# Patient Record
Sex: FEMALE | Race: WHITE | NOT HISPANIC OR LATINO | Employment: UNEMPLOYED | URBAN - METROPOLITAN AREA
[De-identification: names, ages, dates, MRNs, and addresses within clinical notes are randomized per-mention and may not be internally consistent; named-entity substitution may affect disease eponyms.]

---

## 2022-08-17 DIAGNOSIS — I35.0 AORTIC VALVE STENOSIS: Primary | ICD-10-CM

## 2022-08-23 ENCOUNTER — OFFICE VISIT (OUTPATIENT)
Dept: CARDIAC SURGERY | Facility: CLINIC | Age: 77
End: 2022-08-23
Payer: MEDICARE

## 2022-08-23 VITALS
SYSTOLIC BLOOD PRESSURE: 126 MMHG | BODY MASS INDEX: 25.34 KG/M2 | HEART RATE: 73 BPM | DIASTOLIC BLOOD PRESSURE: 69 MMHG | WEIGHT: 143 LBS | HEIGHT: 63 IN

## 2022-08-23 DIAGNOSIS — R06.02 SOB (SHORTNESS OF BREATH): Primary | ICD-10-CM

## 2022-08-23 DIAGNOSIS — I35.1 SEVERE AORTIC INSUFFICIENCY: Primary | ICD-10-CM

## 2022-08-23 DIAGNOSIS — R91.8 MASS OF UPPER LOBE OF RIGHT LUNG: ICD-10-CM

## 2022-08-23 DIAGNOSIS — I35.0 AORTIC VALVE STENOSIS: ICD-10-CM

## 2022-08-23 PROCEDURE — 99204 PR OFFICE/OUTPT VISIT, NEW, LEVL IV, 45-59 MIN: ICD-10-PCS | Mod: ,,, | Performed by: SPECIALIST

## 2022-08-23 PROCEDURE — 99204 OFFICE O/P NEW MOD 45 MIN: CPT | Mod: ,,, | Performed by: SPECIALIST

## 2022-08-23 RX ORDER — METOPROLOL SUCCINATE 50 MG/1
50 TABLET, EXTENDED RELEASE ORAL DAILY
Status: ON HOLD | COMMUNITY
Start: 2022-08-15 | End: 2022-10-21 | Stop reason: SDUPTHER

## 2022-08-23 RX ORDER — POTASSIUM CHLORIDE 20 MEQ/1
20 TABLET, EXTENDED RELEASE ORAL DAILY
Status: ON HOLD | COMMUNITY
Start: 2022-08-15 | End: 2022-10-21 | Stop reason: SDUPTHER

## 2022-08-23 RX ORDER — SIMVASTATIN 20 MG/1
20 TABLET, FILM COATED ORAL NIGHTLY
COMMUNITY
Start: 2022-08-15

## 2022-08-23 RX ORDER — SULFAMETHOXAZOLE AND TRIMETHOPRIM 800; 160 MG/1; MG/1
1 TABLET ORAL DAILY
COMMUNITY
End: 2022-09-13

## 2022-08-23 RX ORDER — RALOXIFENE HYDROCHLORIDE 60 MG/1
60 TABLET, FILM COATED ORAL DAILY
COMMUNITY
Start: 2022-08-15

## 2022-08-23 RX ORDER — LOSARTAN POTASSIUM 100 MG/1
100 TABLET ORAL DAILY
Status: ON HOLD | COMMUNITY
Start: 2022-08-15 | End: 2022-10-12 | Stop reason: HOSPADM

## 2022-08-23 RX ORDER — FUROSEMIDE 40 MG/1
40 TABLET ORAL DAILY
Status: ON HOLD | COMMUNITY
Start: 2022-08-15 | End: 2022-10-12 | Stop reason: HOSPADM

## 2022-08-23 RX ORDER — LEVOTHYROXINE SODIUM 75 UG/1
75 TABLET ORAL DAILY
Status: ON HOLD | COMMUNITY
Start: 2022-08-15 | End: 2022-10-21 | Stop reason: HOSPADM

## 2022-08-23 NOTE — PROGRESS NOTES
[facility]  History & Physical  Cardiothoracic Surgery    SUBJECTIVE:     Chief Complaint/Reason for Visit:   Chief Complaint   Patient presents with    Pre-op Exam     Dr. Mason RE: AVR         History of Present Illness:  Patient is a 76 y.o. female presents referred by Dr. Mason for evaluation for aortic valve replacement.  She is a 76-year-old female who has been followed for valvular disease for at least 5 years.  She has had intermittent mitral regurg and now appears to be minimal.  But now she has severe aortic insufficiency at 4+.  She has diminished LV function at about 45%.  She really is asymptomatic at this time.  She has a mild to moderately enlarged ascending aorta at 4.1 cm on the proximal portion.  She is also getting some LV distention.  I do think she will benefit from an aortic valve replacement with a porcine valve.  We need a little bit more information before planning surgery.  I would like to get a CT of the chest to evaluate the remaining aorta.  In addition we will obtain a left heart catheterization.  I will see her back after these test.    Review of patient's allergies indicates:   Allergen Reactions    Ace inhibitors     Aspirin     Atorvastatin     Penicillins        No past medical history on file.  No past surgical history on file.  No family history on file.  Social History     Tobacco Use    Smoking status: Never Smoker    Smokeless tobacco: Never Used        Review of Systems:  Review of Systems   Constitutional: Negative.   HENT: Negative.    Eyes: Negative.    Cardiovascular: Negative.    Endocrine: Negative.    Skin: Negative.    Musculoskeletal: Negative.    Gastrointestinal: Negative.    Genitourinary: Negative.    Neurological: Negative.    Psychiatric/Behavioral: Negative.    Allergic/Immunologic: Negative.        OBJECTIVE:     Vital Signs (Most Recent):  Pulse: 73 (08/23/22 1255)  BP: 126/69 (08/23/22 1255)    Admission: Weight: 64.9 kg  (143 lb) (08/23/22 1255)   Most Recent: Weight: 64.9 kg (143 lb) (08/23/22 1255)    Physical Exam:  Physical Exam  Vitals reviewed.   Constitutional:       Appearance: Normal appearance. She is normal weight.   HENT:      Head: Normocephalic and atraumatic.   Eyes:      Pupils: Pupils are equal, round, and reactive to light.   Cardiovascular:      Rate and Rhythm: Normal rate and regular rhythm.      Heart sounds: Murmur heard.      Comments: Soft murmur of aortic insufficiency  Pulmonary:      Effort: Pulmonary effort is normal.      Breath sounds: Normal breath sounds.   Abdominal:      General: Abdomen is flat.      Palpations: Abdomen is soft.   Musculoskeletal:         General: Normal range of motion.      Cervical back: Normal range of motion.   Skin:     General: Skin is warm.   Neurological:      General: No focal deficit present.      Mental Status: She is alert and oriented to person, place, and time.   Psychiatric:         Mood and Affect: Mood normal.         Behavior: Behavior normal.         Diagnostic Results:  Echo: Reviewed      ASSESSMENT/PLAN:     1. Aortic valve stenosis    Severe aortic insufficiency   Mild mitral regurgitation   Cardiomyopathy with EF of 45%   LV dilatation   Ascending aortic dilatation   Obtain CT of chest to evaluate aorta  Obtain left heart catheterization  Office visit after above for surgical planning

## 2022-08-30 ENCOUNTER — TELEPHONE (OUTPATIENT)
Dept: CARDIAC SURGERY | Facility: CLINIC | Age: 77
End: 2022-08-30
Payer: MEDICARE

## 2022-08-30 NOTE — TELEPHONE ENCOUNTER
Rec'd call from [facility] on yesterday afternoon stating Ms. Bacon called and LM without call back #, asking if we can call her back. Returned call @1500, spoke with her and gave her the scheduling phone number [phone], to schedule her CT. Voiced understanding and stated she would call.  This morning rec'd call from [facility] again, stating that Ms. Bacon called and LM. Returned call to Ms. Bacon @ 0815. She stated that she was trying to schedule her CT and that she called the number I gave her. Alternate #, [phone], given to her should she need.

## 2022-08-31 ENCOUNTER — HOSPITAL ENCOUNTER (OUTPATIENT)
Dept: RADIOLOGY | Facility: HOSPITAL | Age: 77
Discharge: HOME | End: 2022-08-31
Attending: SPECIALIST
Payer: MEDICARE

## 2022-08-31 DIAGNOSIS — R06.02 SOB (SHORTNESS OF BREATH): ICD-10-CM

## 2022-08-31 PROCEDURE — 71250 CT THORAX DX C-: CPT | Mod: TC

## 2022-09-13 ENCOUNTER — OFFICE VISIT (OUTPATIENT)
Dept: CARDIAC SURGERY | Facility: CLINIC | Age: 77
End: 2022-09-13
Payer: MEDICARE

## 2022-09-13 VITALS
SYSTOLIC BLOOD PRESSURE: 145 MMHG | HEIGHT: 63 IN | BODY MASS INDEX: 25.16 KG/M2 | DIASTOLIC BLOOD PRESSURE: 50 MMHG | WEIGHT: 142 LBS | HEART RATE: 60 BPM

## 2022-09-13 DIAGNOSIS — I42.8 NONISCHEMIC CARDIOMYOPATHY: ICD-10-CM

## 2022-09-13 DIAGNOSIS — I35.2 NONRHEUMATIC AORTIC INSUFFICIENCY WITH AORTIC STENOSIS: ICD-10-CM

## 2022-09-13 DIAGNOSIS — I71.21 ASCENDING AORTIC ANEURYSM: Primary | ICD-10-CM

## 2022-09-13 PROCEDURE — 99214 PR OFFICE/OUTPT VISIT, EST, LEVL IV, 30-39 MIN: ICD-10-PCS | Mod: ,,, | Performed by: SPECIALIST

## 2022-09-13 PROCEDURE — 99214 OFFICE O/P EST MOD 30 MIN: CPT | Mod: ,,, | Performed by: SPECIALIST

## 2022-09-13 NOTE — PROGRESS NOTES
[facility]  History & Physical  Cardiothoracic Surgery    SUBJECTIVE:     Chief Complaint/Reason for Visit:   Chief Complaint   Patient presents with    Pre-op Exam     Results CT and [facility], Discuss Sx AVR        History of Present Illness:  Patient is a 76 y.o. female presents now for follow-up after having had a left heart catheterization and her CT scan of the chest.  Left heart cath reveals no coronary disease.  CT scan of the chest reveals the ascending aorta is 4.6 cm.  On my measurement in some areas it is 4.8 cm.  I do think she will benefit from resection of the ascending aortic aneurysm and an aortic valve replacement with a porcine valve.  I discussed the risk benefits in great detail with the patient.  She understands and would like to proceed.    Review of patient's allergies indicates:   Allergen Reactions    Ace inhibitors     Aspirin     Atorvastatin     Penicillins        Past Medical History:   Diagnosis Date    Hypertension     Hypothyroidism     Stenosis of aortic and mitral valves     Valvular regurgitation      Past Surgical History:   Procedure Laterality Date    CATARACT EXTRACTION       No family history on file.  Social History     Tobacco Use    Smoking status: Never    Smokeless tobacco: Never        Review of Systems:  Review of Systems   Constitutional: Negative.   HENT: Negative.     Eyes: Negative.    Cardiovascular: Negative.    Respiratory: Negative.     Endocrine: Negative.    Hematologic/Lymphatic: Negative.    Skin: Negative.    Musculoskeletal: Negative.    Gastrointestinal: Negative.    Genitourinary: Negative.    Neurological: Negative.    Psychiatric/Behavioral: Negative.       OBJECTIVE:     Vital Signs (Most Recent):  Pulse: 60 (09/13/22 0904)  BP: (!) 145/50 (09/13/22 0904)    Admission: Weight: 64.4 kg (142 lb) (09/13/22 0904)   Most Recent: Weight: 64.4 kg (142 lb) (09/13/22 0904)    Physical Exam:  Physical Exam  Constitutional:        Appearance: Normal appearance. She is normal weight.   HENT:      Head: Normocephalic and atraumatic.   Eyes:      Pupils: Pupils are equal, round, and reactive to light.   Cardiovascular:      Rate and Rhythm: Normal rate and regular rhythm.      Pulses: Normal pulses.      Heart sounds: Normal heart sounds.   Pulmonary:      Effort: Pulmonary effort is normal.      Breath sounds: Normal breath sounds.   Abdominal:      General: Abdomen is flat.      Palpations: Abdomen is soft.   Musculoskeletal:         General: Normal range of motion.      Cervical back: Normal range of motion.   Skin:     General: Skin is warm.   Neurological:      General: No focal deficit present.      Mental Status: She is alert and oriented to person, place, and time.   Psychiatric:         Mood and Affect: Mood normal.         Behavior: Behavior normal.       Diagnostic Results:  CT: Reviewed  Cardiac Cath: Reviewed      ASSESSMENT/PLAN:     No diagnosis found.  Severe aortic insufficiency   Mild mitral regurgitation   Nonischemic cardiomyopathy with 45% EF  Ascending aortic aneurysm at 4.6 cm   Dilated left ventricle  Planning aortic valve replacement with a porcine valve, resection of ascending aortic aneurysm, ligation left atrial appendage

## 2022-09-13 NOTE — H&P (VIEW-ONLY)
[facility]  History & Physical  Cardiothoracic Surgery    SUBJECTIVE:     Chief Complaint/Reason for Visit:   Chief Complaint   Patient presents with    Pre-op Exam     Results CT and [facility], Discuss Sx AVR        History of Present Illness:  Patient is a 76 y.o. female presents now for follow-up after having had a left heart catheterization and her CT scan of the chest.  Left heart cath reveals no coronary disease.  CT scan of the chest reveals the ascending aorta is 4.6 cm.  On my measurement in some areas it is 4.8 cm.  I do think she will benefit from resection of the ascending aortic aneurysm and an aortic valve replacement with a porcine valve.  I discussed the risk benefits in great detail with the patient.  She understands and would like to proceed.    Review of patient's allergies indicates:   Allergen Reactions    Ace inhibitors     Aspirin     Atorvastatin     Penicillins        Past Medical History:   Diagnosis Date    Hypertension     Hypothyroidism     Stenosis of aortic and mitral valves     Valvular regurgitation      Past Surgical History:   Procedure Laterality Date    CATARACT EXTRACTION       No family history on file.  Social History     Tobacco Use    Smoking status: Never    Smokeless tobacco: Never        Review of Systems:  Review of Systems   Constitutional: Negative.   HENT: Negative.     Eyes: Negative.    Cardiovascular: Negative.    Respiratory: Negative.     Endocrine: Negative.    Hematologic/Lymphatic: Negative.    Skin: Negative.    Musculoskeletal: Negative.    Gastrointestinal: Negative.    Genitourinary: Negative.    Neurological: Negative.    Psychiatric/Behavioral: Negative.       OBJECTIVE:     Vital Signs (Most Recent):  Pulse: 60 (09/13/22 0904)  BP: (!) 145/50 (09/13/22 0904)    Admission: Weight: 64.4 kg (142 lb) (09/13/22 0904)   Most Recent: Weight: 64.4 kg (142 lb) (09/13/22 0904)    Physical Exam:  Physical Exam  Constitutional:        Appearance: Normal appearance. She is normal weight.   HENT:      Head: Normocephalic and atraumatic.   Eyes:      Pupils: Pupils are equal, round, and reactive to light.   Cardiovascular:      Rate and Rhythm: Normal rate and regular rhythm.      Pulses: Normal pulses.      Heart sounds: Normal heart sounds.   Pulmonary:      Effort: Pulmonary effort is normal.      Breath sounds: Normal breath sounds.   Abdominal:      General: Abdomen is flat.      Palpations: Abdomen is soft.   Musculoskeletal:         General: Normal range of motion.      Cervical back: Normal range of motion.   Skin:     General: Skin is warm.   Neurological:      General: No focal deficit present.      Mental Status: She is alert and oriented to person, place, and time.   Psychiatric:         Mood and Affect: Mood normal.         Behavior: Behavior normal.       Diagnostic Results:  CT: Reviewed  Cardiac Cath: Reviewed      ASSESSMENT/PLAN:     No diagnosis found.  Severe aortic insufficiency   Mild mitral regurgitation   Nonischemic cardiomyopathy with 45% EF  Ascending aortic aneurysm at 4.6 cm   Dilated left ventricle  Planning aortic valve replacement with a porcine valve, resection of ascending aortic aneurysm, ligation left atrial appendage

## 2022-09-15 DIAGNOSIS — Z51.81 ADMISSION FOR LONG-TERM (CURRENT) USE OF ANTICOAGULANTS: ICD-10-CM

## 2022-09-15 DIAGNOSIS — I71.21 ASCENDING AORTIC ANEURYSM: Primary | ICD-10-CM

## 2022-09-15 DIAGNOSIS — Z79.01 ADMISSION FOR LONG-TERM (CURRENT) USE OF ANTICOAGULANTS: ICD-10-CM

## 2022-09-15 RX ORDER — HYDROCODONE BITARTRATE AND ACETAMINOPHEN 500; 5 MG/1; MG/1
TABLET ORAL
Status: CANCELLED | OUTPATIENT
Start: 2022-09-15

## 2022-09-15 RX ORDER — MUPIROCIN 20 MG/G
OINTMENT TOPICAL
Status: CANCELLED | OUTPATIENT
Start: 2022-09-15 | End: 2022-09-15

## 2022-09-23 RX ORDER — FAMOTIDINE 20 MG/1
20 TABLET, FILM COATED ORAL DAILY PRN
Status: ON HOLD | COMMUNITY
End: 2022-10-21 | Stop reason: HOSPADM

## 2022-09-23 RX ORDER — MULTIVIT WITH MINERALS/HERBS
1 TABLET ORAL DAILY
COMMUNITY
End: 2022-10-12

## 2022-09-23 RX ORDER — EPINEPHRINE 0.22MG
100 AEROSOL WITH ADAPTER (ML) INHALATION DAILY
COMMUNITY
End: 2022-10-12

## 2022-09-23 RX ORDER — CHOLECALCIFEROL (VITAMIN D3) 25 MCG
1000 TABLET ORAL 2 TIMES DAILY
Status: ON HOLD | COMMUNITY
End: 2022-10-21 | Stop reason: HOSPADM

## 2022-09-23 RX ORDER — GLUCOSAMINE/CHONDR SU A SOD 750-600 MG
2 TABLET ORAL DAILY
Status: ON HOLD | COMMUNITY
End: 2022-09-28 | Stop reason: CLARIF

## 2022-09-23 RX ORDER — SPIRONOLACTONE 25 MG/1
25 TABLET ORAL DAILY
Status: ON HOLD | COMMUNITY
End: 2022-10-21 | Stop reason: HOSPADM

## 2022-09-26 ENCOUNTER — LAB VISIT (OUTPATIENT)
Dept: LAB | Facility: HOSPITAL | Age: 77
DRG: 219 | End: 2022-09-26
Attending: SPECIALIST
Payer: MEDICARE

## 2022-09-26 ENCOUNTER — HOSPITAL ENCOUNTER (OUTPATIENT)
Dept: RADIOLOGY | Facility: HOSPITAL | Age: 77
Discharge: HOME | DRG: 219 | End: 2022-09-26
Attending: SPECIALIST
Payer: MEDICARE

## 2022-09-26 DIAGNOSIS — Z79.01 ADMISSION FOR LONG-TERM (CURRENT) USE OF ANTICOAGULANTS: ICD-10-CM

## 2022-09-26 DIAGNOSIS — Z51.81 ADMISSION FOR LONG-TERM (CURRENT) USE OF ANTICOAGULANTS: ICD-10-CM

## 2022-09-26 DIAGNOSIS — I71.21 ASCENDING AORTIC ANEURYSM: ICD-10-CM

## 2022-09-26 LAB
ABORH RETYPE: NORMAL
ALBUMIN SERPL-MCNC: 4.1 GM/DL (ref 3.4–4.8)
ALBUMIN/GLOB SERPL: 1.3 RATIO (ref 1.1–2)
ALP SERPL-CCNC: 79 UNIT/L (ref 40–150)
ALT SERPL-CCNC: 26 UNIT/L (ref 0–55)
APPEARANCE UR: CLEAR
APTT PPP: 35.3 SECONDS (ref 23.2–33.7)
AST SERPL-CCNC: 32 UNIT/L (ref 5–34)
BACTERIA #/AREA URNS AUTO: ABNORMAL /HPF
BASOPHILS # BLD AUTO: 0.03 X10(3)/MCL (ref 0–0.2)
BASOPHILS NFR BLD AUTO: 0.5 %
BILIRUB UR QL STRIP.AUTO: NEGATIVE MG/DL
BILIRUBIN DIRECT+TOT PNL SERPL-MCNC: 0.9 MG/DL
BUN SERPL-MCNC: 13 MG/DL (ref 9.8–20.1)
CALCIUM SERPL-MCNC: 9.5 MG/DL (ref 8.4–10.2)
CHLORIDE SERPL-SCNC: 103 MMOL/L (ref 98–107)
CLOSURE TME COLL+ADP BLD: 87 SECONDS (ref 46–119)
CLOSURE TME COLL+EPINEP BLD: 125 SECONDS (ref 68–183)
CO2 SERPL-SCNC: 27 MMOL/L (ref 23–31)
COLOR UR AUTO: YELLOW
CREAT SERPL-MCNC: 0.9 MG/DL (ref 0.55–1.02)
EOSINOPHIL # BLD AUTO: 0.08 X10(3)/MCL (ref 0–0.9)
EOSINOPHIL NFR BLD AUTO: 1.2 %
ERYTHROCYTE [DISTWIDTH] IN BLOOD BY AUTOMATED COUNT: 12.6 % (ref 11.5–17)
GFR SERPLBLD CREATININE-BSD FMLA CKD-EPI: >60 MLS/MIN/1.73/M2
GLOBULIN SER-MCNC: 3.2 GM/DL (ref 2.4–3.5)
GLUCOSE SERPL-MCNC: 83 MG/DL (ref 82–115)
GLUCOSE UR QL STRIP.AUTO: NEGATIVE MG/DL
GROUP & RH: NORMAL
HCT VFR BLD AUTO: 41.6 % (ref 37–47)
HGB BLD-MCNC: 13.3 GM/DL (ref 12–16)
IMM GRANULOCYTES # BLD AUTO: 0.02 X10(3)/MCL (ref 0–0.04)
IMM GRANULOCYTES NFR BLD AUTO: 0.3 %
INDIRECT COOMBS GEL: NORMAL
KETONES UR QL STRIP.AUTO: NEGATIVE MG/DL
LEUKOCYTE ESTERASE UR QL STRIP.AUTO: ABNORMAL UNIT/L
LYMPHOCYTES # BLD AUTO: 1.91 X10(3)/MCL (ref 0.6–4.6)
LYMPHOCYTES NFR BLD AUTO: 29.1 %
MCH RBC QN AUTO: 31.1 PG (ref 27–31)
MCHC RBC AUTO-ENTMCNC: 32 MG/DL (ref 33–36)
MCV RBC AUTO: 97.4 FL (ref 80–94)
MONOCYTES # BLD AUTO: 0.7 X10(3)/MCL (ref 0.1–1.3)
MONOCYTES NFR BLD AUTO: 10.7 %
MRSA PCR SCRN (OHS): NOT DETECTED
NEUTROPHILS # BLD AUTO: 3.8 X10(3)/MCL (ref 2.1–9.2)
NEUTROPHILS NFR BLD AUTO: 58.2 %
NITRITE UR QL STRIP.AUTO: NEGATIVE
NRBC BLD AUTO-RTO: 0 %
PH UR STRIP.AUTO: 7 [PH]
PLATELET # BLD AUTO: 268 X10(3)/MCL (ref 130–400)
PMV BLD AUTO: 9.6 FL (ref 7.4–10.4)
POTASSIUM SERPL-SCNC: 4.3 MMOL/L (ref 3.5–5.1)
PROT SERPL-MCNC: 7.3 GM/DL (ref 5.8–7.6)
PROT UR QL STRIP.AUTO: NEGATIVE MG/DL
RBC # BLD AUTO: 4.27 X10(6)/MCL (ref 4.2–5.4)
RBC #/AREA URNS AUTO: <5 /HPF
RBC UR QL AUTO: NEGATIVE UNIT/L
SODIUM SERPL-SCNC: 139 MMOL/L (ref 136–145)
SP GR UR STRIP.AUTO: 1.01 (ref 1–1.03)
SQUAMOUS #/AREA URNS AUTO: <5 /HPF
UROBILINOGEN UR STRIP-ACNC: 0.2 MG/DL
WBC # SPEC AUTO: 6.6 X10(3)/MCL (ref 4.5–11.5)
WBC #/AREA URNS AUTO: 55 /HPF

## 2022-09-26 PROCEDURE — 36415 COLL VENOUS BLD VENIPUNCTURE: CPT

## 2022-09-26 PROCEDURE — 71046 X-RAY EXAM CHEST 2 VIEWS: CPT | Mod: TC

## 2022-09-26 PROCEDURE — 85576 BLOOD PLATELET AGGREGATION: CPT

## 2022-09-26 PROCEDURE — 81001 URINALYSIS AUTO W/SCOPE: CPT

## 2022-09-26 PROCEDURE — 93010 EKG 12-LEAD: ICD-10-PCS | Mod: ,,, | Performed by: INTERNAL MEDICINE

## 2022-09-26 PROCEDURE — 87641 MR-STAPH DNA AMP PROBE: CPT

## 2022-09-26 PROCEDURE — 93010 ELECTROCARDIOGRAM REPORT: CPT | Mod: ,,, | Performed by: INTERNAL MEDICINE

## 2022-09-26 PROCEDURE — 86920 COMPATIBILITY TEST SPIN: CPT | Performed by: SPECIALIST

## 2022-09-26 PROCEDURE — 93005 ELECTROCARDIOGRAM TRACING: CPT

## 2022-09-26 PROCEDURE — 80053 COMPREHEN METABOLIC PANEL: CPT

## 2022-09-26 PROCEDURE — 85025 COMPLETE CBC W/AUTO DIFF WBC: CPT

## 2022-09-26 PROCEDURE — 85730 THROMBOPLASTIN TIME PARTIAL: CPT

## 2022-09-26 PROCEDURE — 86901 BLOOD TYPING SEROLOGIC RH(D): CPT | Performed by: SPECIALIST

## 2022-09-27 ENCOUNTER — ANESTHESIA EVENT (OUTPATIENT)
Dept: SURGERY | Facility: HOSPITAL | Age: 77
DRG: 219 | End: 2022-09-27
Payer: MEDICARE

## 2022-09-27 RX ORDER — LEVOFLOXACIN 500 MG/1
500 TABLET, FILM COATED ORAL DAILY
Qty: 2 TABLET | Refills: 0 | Status: ON HOLD | OUTPATIENT
Start: 2022-09-27 | End: 2022-10-12 | Stop reason: HOSPADM

## 2022-09-27 NOTE — ANESTHESIA PREPROCEDURE EVALUATION
09/27/2022  Sydney Bacon is a 76 y.o., female.  Severe aortic insufficiency   Mild mitral regurgitation   Nonischemic cardiomyopathy with 45% EF  Ascending aortic aneurysm at 4.6 cm   Dilated left ventricle  Planning aortic valve replacement with a porcine valve, resection of ascending aortic aneurysm, ligation left atrial appendage   Latest Reference Range & Units Most Recent   Hemoglobin 12.0 - 16.0 gm/dL 13.3  9/26/22 10:38   Hematocrit 37.0 - 47.0 % 41.6  9/26/22 10:38   MCV 80.0 - 94.0 fL 97.4 (H)  9/26/22 10:38   MCH 27.0 - 31.0 pg 31.1 (H)  9/26/22 10:38   MCHC 33.0 - 36.0 mg/dL 32.0 (L)  9/26/22 10:38   RDW 11.5 - 17.0 % 12.6  9/26/22 10:38   Platelets 130 - 400 x10(3)/mcL 268  9/26/22 10:38   (H): Data is abnormally high  (L): Data is abnormally low     Latest Reference Range & Units Most Recent   Sodium 136 - 145 mmol/L 139  9/26/22 10:38   Potassium 3.5 - 5.1 mmol/L 4.3  9/26/22 10:38   Chloride 98 - 107 mmol/L 103  9/26/22 10:38   CO2 23 - 31 mmol/L 27  9/26/22 10:38   BUN 9.8 - 20.1 mg/dL 13.0  9/26/22 10:38   Creatinine 0.55 - 1.02 mg/dL 0.90  9/26/22 10:38       EKG 1st Deg AVB  Pre-op Assessment    I have reviewed the Patient Summary Reports.     I have reviewed the Nursing Notes. I have reviewed the NPO Status.   I have reviewed the Medications.     Review of Systems  Anesthesia Hx:  No problems with previous Anesthesia        Physical Exam  General: Well nourished, Cooperative, Alert and Oriented    Airway:  Mallampati: III / II  Mouth Opening: Small, but > 3cm  TM Distance: > 6 cm  Tongue: Normal  Neck ROM: Extension Decreased    Dental:  Intact    Chest/Lungs:  Normal Respiratory Rate    Heart:  Rate: Normal  Rhythm: Regular Rhythm        Anesthesia Plan  Type of Anesthesia, risks & benefits discussed:    Anesthesia Type: Gen ETT  Intra-op Monitoring Plan: Standard ASA Monitors, Art  Line, Central Line, MING and CO  Post Op Pain Control Plan: multimodal analgesia and IV/PO Opioids PRN  Induction:  IV  Airway Plan: Direct, Post-Induction  Informed Consent: Informed consent signed with the Patient and all parties understand the risks and agree with anesthesia plan.  All questions answered. Patient consented to blood products? Yes  ASA Score: 3    Ready For Surgery From Anesthesia Perspective.     .

## 2022-09-28 ENCOUNTER — ANESTHESIA (OUTPATIENT)
Dept: SURGERY | Facility: HOSPITAL | Age: 77
DRG: 219 | End: 2022-09-28
Payer: MEDICARE

## 2022-09-28 ENCOUNTER — HOSPITAL ENCOUNTER (INPATIENT)
Facility: HOSPITAL | Age: 77
LOS: 14 days | Discharge: NURSING, REHAB OR LONG-TERM CARE | DRG: 219 | End: 2022-10-12
Attending: SPECIALIST | Admitting: SPECIALIST
Payer: MEDICARE

## 2022-09-28 DIAGNOSIS — I35.0 NONRHEUMATIC AORTIC VALVE STENOSIS: ICD-10-CM

## 2022-09-28 DIAGNOSIS — I46.9 CARDIAC ARREST: ICD-10-CM

## 2022-09-28 DIAGNOSIS — I71.21 ASCENDING AORTIC ANEURYSM: ICD-10-CM

## 2022-09-28 DIAGNOSIS — R94.31 EKG ABNORMALITIES: ICD-10-CM

## 2022-09-28 DIAGNOSIS — Z79.01 ADMISSION FOR LONG-TERM (CURRENT) USE OF ANTICOAGULANTS: ICD-10-CM

## 2022-09-28 DIAGNOSIS — I49.01 VENTRICULAR FIBRILLATION: ICD-10-CM

## 2022-09-28 DIAGNOSIS — Z51.81 ADMISSION FOR LONG-TERM (CURRENT) USE OF ANTICOAGULANTS: ICD-10-CM

## 2022-09-28 DIAGNOSIS — I25.10 CAD (CORONARY ARTERY DISEASE): ICD-10-CM

## 2022-09-28 DIAGNOSIS — I48.91 A-FIB: ICD-10-CM

## 2022-09-28 DIAGNOSIS — I35.9 AORTIC VALVE DISEASE: ICD-10-CM

## 2022-09-28 DIAGNOSIS — I47.20 V-TACH: ICD-10-CM

## 2022-09-28 LAB
ALBUMIN SERPL-MCNC: 2.9 GM/DL (ref 3.4–4.8)
ALBUMIN/GLOB SERPL: 1.6 RATIO (ref 1.1–2)
ALP SERPL-CCNC: 44 UNIT/L (ref 40–150)
ALT SERPL-CCNC: 13 UNIT/L (ref 0–55)
APTT PPP: 32.7 SECONDS (ref 23.2–33.7)
AST SERPL-CCNC: 32 UNIT/L (ref 5–34)
BACTERIA UR CULT: ABNORMAL
BASOPHILS # BLD AUTO: 0.05 X10(3)/MCL (ref 0–0.2)
BASOPHILS NFR BLD AUTO: 0.3 %
BILIRUBIN DIRECT+TOT PNL SERPL-MCNC: 0.9 MG/DL
BSA FOR ECHO PROCEDURE: 1.68 M2
BUN SERPL-MCNC: 11.2 MG/DL (ref 9.8–20.1)
CALCIUM SERPL-MCNC: 9.9 MG/DL (ref 8.4–10.2)
CHLORIDE SERPL-SCNC: 114 MMOL/L (ref 98–107)
CO2 SERPL-SCNC: 25 MMOL/L (ref 23–31)
CORRECTED TEMPERATURE (PCO2): 38 MMHG (ref 35–45)
CORRECTED TEMPERATURE (PCO2): 39 MMHG (ref 35–45)
CORRECTED TEMPERATURE (PCO2): 41 MMHG (ref 35–45)
CORRECTED TEMPERATURE (PCO2): 42 MMHG (ref 35–45)
CORRECTED TEMPERATURE (PH): 7.31 (ref 7.35–7.45)
CORRECTED TEMPERATURE (PH): 7.36 (ref 7.35–7.45)
CORRECTED TEMPERATURE (PH): 7.36 (ref 7.35–7.45)
CORRECTED TEMPERATURE (PH): 7.44 (ref 7.35–7.45)
CORRECTED TEMPERATURE (PO2): 55 MMHG (ref 80–100)
CORRECTED TEMPERATURE (PO2): 65 MMHG (ref 80–100)
CORRECTED TEMPERATURE (PO2): 67 MMHG (ref 80–100)
CORRECTED TEMPERATURE (PO2): 84 MMHG (ref 80–100)
CPAP: 5 CM H2O
CREAT SERPL-MCNC: 0.69 MG/DL (ref 0.55–1.02)
EOSINOPHIL # BLD AUTO: 0.21 X10(3)/MCL (ref 0–0.9)
EOSINOPHIL NFR BLD AUTO: 1.2 %
ERYTHROCYTE [DISTWIDTH] IN BLOOD BY AUTOMATED COUNT: 14.3 % (ref 11.5–17)
GFR SERPLBLD CREATININE-BSD FMLA CKD-EPI: >60 MLS/MIN/1.73/M2
GLOBULIN SER-MCNC: 1.8 GM/DL (ref 2.4–3.5)
GLUCOSE SERPL-MCNC: 102 MG/DL (ref 70–110)
GLUCOSE SERPL-MCNC: 115 MG/DL (ref 82–115)
GLUCOSE SERPL-MCNC: 132 MG/DL (ref 70–110)
HCO3 UR-SCNC: 21.1 MMOL/L (ref 22–26)
HCO3 UR-SCNC: 22 MMOL/L (ref 22–26)
HCO3 UR-SCNC: 23.2 MMOL/L (ref 22–26)
HCO3 UR-SCNC: 25.8 MMOL/L (ref 22–26)
HCT VFR BLD AUTO: 42 % (ref 37–47)
HCT VFR BLD AUTO: 47.2 % (ref 37–47)
HGB BLD-MCNC: 14.2 GM/DL (ref 12–16)
HGB BLD-MCNC: 14.8 G/DL (ref 12–16)
HGB BLD-MCNC: 14.8 G/DL (ref 12–16)
HGB BLD-MCNC: 15.4 GM/DL (ref 12–16)
HGB BLD-MCNC: 15.9 G/DL (ref 12–16)
HGB BLD-MCNC: 7.2 G/DL (ref 12–16)
IMM GRANULOCYTES # BLD AUTO: 0.11 X10(3)/MCL (ref 0–0.04)
IMM GRANULOCYTES NFR BLD AUTO: 0.6 %
INR BLD: 1.63 (ref 0–1.3)
LYMPHOCYTES # BLD AUTO: 4 X10(3)/MCL (ref 0.6–4.6)
LYMPHOCYTES NFR BLD AUTO: 22.4 %
MCH RBC QN AUTO: 31.3 PG (ref 27–31)
MCHC RBC AUTO-ENTMCNC: 33.8 MG/DL (ref 33–36)
MCV RBC AUTO: 92.7 FL (ref 80–94)
MONOCYTES # BLD AUTO: 0.56 X10(3)/MCL (ref 0.1–1.3)
MONOCYTES NFR BLD AUTO: 3.1 %
NEUTROPHILS # BLD AUTO: 12.9 X10(3)/MCL (ref 2.1–9.2)
NEUTROPHILS NFR BLD AUTO: 72.4 %
NRBC BLD AUTO-RTO: 0 %
PCO2 BLDA: 38 MMHG (ref 35–45)
PCO2 BLDA: 39 MMHG (ref 35–45)
PCO2 BLDA: 41 MMHG (ref 35–45)
PCO2 BLDA: 42 MMHG (ref 35–45)
PH SMN: 7.31 [PH] (ref 7.35–7.45)
PH SMN: 7.36 [PH] (ref 7.35–7.45)
PH SMN: 7.36 [PH] (ref 7.35–7.45)
PH SMN: 7.44 [PH] (ref 7.35–7.45)
PLATELET # BLD AUTO: 117 X10(3)/MCL (ref 130–400)
PMV BLD AUTO: 9.8 FL (ref 7.4–10.4)
PO2 BLDA: 55 MMHG (ref 80–100)
PO2 BLDA: 65 MMHG (ref 80–100)
PO2 BLDA: 67 MMHG (ref 80–100)
PO2 BLDA: 84 MMHG (ref 80–100)
POC BASE DEFICIT: -2.2 MMOL/L (ref -2–2)
POC BASE DEFICIT: -3.1 MMOL/L (ref -2–2)
POC BASE DEFICIT: -5 MMOL/L (ref -2–2)
POC BASE DEFICIT: 1.5 MMOL/L (ref -2–2)
POC COHB: 1.7 %
POC COHB: 1.7 %
POC COHB: 2 %
POC COHB: 2.1 %
POC IONIZED CALCIUM: 1.05 MMOL/L (ref 1.12–1.23)
POC IONIZED CALCIUM: 1.18 MMOL/L (ref 1.12–1.23)
POC IONIZED CALCIUM: 1.23 MMOL/L (ref 1.12–1.23)
POC IONIZED CALCIUM: 1.33 MMOL/L (ref 1.12–1.23)
POC METHB: 0.3 % (ref 0.4–1.5)
POC METHB: 1 % (ref 0.4–1.5)
POC METHB: 1 % (ref 0.4–1.5)
POC METHB: 1.1 % (ref 0.4–1.5)
POC O2HB: 90.9 % (ref 94–97)
POC O2HB: 91.5 % (ref 94–97)
POC O2HB: 92.6 % (ref 94–97)
POC O2HB: 94.5 % (ref 94–97)
POC SATURATED O2: 89.4 %
POC SATURATED O2: 90.2 %
POC SATURATED O2: 92.2 %
POC SATURATED O2: 95.8 %
POC TEMPERATURE: 37 °C
POCT GLUCOSE: 102 MG/DL (ref 70–110)
POCT GLUCOSE: 114 MG/DL (ref 70–110)
POCT GLUCOSE: 122 MG/DL (ref 70–110)
POCT GLUCOSE: 123 MG/DL (ref 70–110)
POCT GLUCOSE: 128 MG/DL (ref 70–110)
POCT GLUCOSE: 132 MG/DL (ref 70–110)
POCT GLUCOSE: 132 MG/DL (ref 70–110)
POCT GLUCOSE: 135 MG/DL (ref 70–110)
POCT GLUCOSE: 145 MG/DL (ref 70–110)
POCT GLUCOSE: 151 MG/DL (ref 70–110)
POCT GLUCOSE: 162 MG/DL (ref 70–110)
POCT GLUCOSE: 82 MG/DL (ref 70–110)
POCT GLUCOSE: 86 MG/DL (ref 70–110)
POTASSIUM BLD-SCNC: 3 MMOL/L (ref 3.5–5)
POTASSIUM BLD-SCNC: 3.2 MMOL/L (ref 3.5–5)
POTASSIUM BLD-SCNC: 3.3 MMOL/L (ref 3.5–5)
POTASSIUM BLD-SCNC: 3.7 MMOL/L (ref 3.5–5)
POTASSIUM SERPL-SCNC: 3 MMOL/L (ref 3.5–5.1)
POTASSIUM SERPL-SCNC: 3.4 MMOL/L (ref 3.5–5.1)
PROT SERPL-MCNC: 4.7 GM/DL (ref 5.8–7.6)
PROTHROMBIN TIME: 19.1 SECONDS (ref 12.5–14.5)
RBC # BLD AUTO: 4.53 X10(6)/MCL (ref 4.2–5.4)
SODIUM BLD-SCNC: 137 MMOL/L (ref 137–145)
SODIUM BLD-SCNC: 137 MMOL/L (ref 137–145)
SODIUM BLD-SCNC: 138 MMOL/L (ref 137–145)
SODIUM BLD-SCNC: 140 MMOL/L (ref 137–145)
SODIUM SERPL-SCNC: 143 MMOL/L (ref 136–145)
SPECIMEN SOURCE: ABNORMAL
WBC # SPEC AUTO: 17.8 X10(3)/MCL (ref 4.5–11.5)

## 2022-09-28 PROCEDURE — 33859 PR GRAFT, ASC AORTA, W/CPB, W/VALVE SUSP, OTHER AORTIC DISEASE: ICD-10-PCS | Mod: AS,,,

## 2022-09-28 PROCEDURE — 33859 AS-AORT GRF F/DS OTH/THN DSJ: CPT | Mod: ,,, | Performed by: SPECIALIST

## 2022-09-28 PROCEDURE — 85025 COMPLETE CBC W/AUTO DIFF WBC: CPT | Performed by: SPECIALIST

## 2022-09-28 PROCEDURE — 63600175 PHARM REV CODE 636 W HCPCS: Performed by: NURSE ANESTHETIST, CERTIFIED REGISTERED

## 2022-09-28 PROCEDURE — 63600175 PHARM REV CODE 636 W HCPCS

## 2022-09-28 PROCEDURE — 99900035 HC TECH TIME PER 15 MIN (STAT)

## 2022-09-28 PROCEDURE — 80053 COMPREHEN METABOLIC PANEL: CPT | Performed by: SPECIALIST

## 2022-09-28 PROCEDURE — C1729 CATH, DRAINAGE: HCPCS | Performed by: SPECIALIST

## 2022-09-28 PROCEDURE — 25000003 PHARM REV CODE 250: Performed by: SPECIALIST

## 2022-09-28 PROCEDURE — 37799 UNLISTED PX VASCULAR SURGERY: CPT

## 2022-09-28 PROCEDURE — 25000003 PHARM REV CODE 250

## 2022-09-28 PROCEDURE — P9045 ALBUMIN (HUMAN), 5%, 250 ML: HCPCS | Mod: JG

## 2022-09-28 PROCEDURE — 36600 WITHDRAWAL OF ARTERIAL BLOOD: CPT

## 2022-09-28 PROCEDURE — 33405 PR REPLACE AORT VALV,PROSTH VALV: ICD-10-PCS | Mod: 51,AS,,

## 2022-09-28 PROCEDURE — 63600175 PHARM REV CODE 636 W HCPCS: Performed by: SPECIALIST

## 2022-09-28 PROCEDURE — 33405 PR REPLACE AORT VALV,PROSTH VALV: ICD-10-PCS | Mod: 51,,, | Performed by: SPECIALIST

## 2022-09-28 PROCEDURE — 63600175 PHARM REV CODE 636 W HCPCS: Mod: JG

## 2022-09-28 PROCEDURE — 33405 REPLACEMENT AORTIC VALVE OPN: CPT | Mod: 51,,, | Performed by: SPECIALIST

## 2022-09-28 PROCEDURE — 33859 AS-AORT GRF F/DS OTH/THN DSJ: CPT | Mod: AS,,,

## 2022-09-28 PROCEDURE — C1768 GRAFT, VASCULAR: HCPCS | Performed by: SPECIALIST

## 2022-09-28 PROCEDURE — 85610 PROTHROMBIN TIME: CPT | Performed by: SPECIALIST

## 2022-09-28 PROCEDURE — 84132 ASSAY OF SERUM POTASSIUM: CPT | Performed by: SPECIALIST

## 2022-09-28 PROCEDURE — 85730 THROMBOPLASTIN TIME PARTIAL: CPT

## 2022-09-28 PROCEDURE — 37000008 HC ANESTHESIA 1ST 15 MINUTES: Performed by: SPECIALIST

## 2022-09-28 PROCEDURE — 27200966 HC CLOSED SUCTION SYSTEM

## 2022-09-28 PROCEDURE — 27201423 OPTIME MED/SURG SUP & DEVICES STERILE SUPPLY: Performed by: SPECIALIST

## 2022-09-28 PROCEDURE — 27100240 HC SENSOR, NONINVASIVE CARDIAC OUTPUT

## 2022-09-28 PROCEDURE — C9248 INJ, CLEVIDIPINE BUTYRATE: HCPCS | Mod: JG | Performed by: SPECIALIST

## 2022-09-28 PROCEDURE — C1894 INTRO/SHEATH, NON-LASER: HCPCS | Performed by: SPECIALIST

## 2022-09-28 PROCEDURE — 82803 BLOOD GASES ANY COMBINATION: CPT

## 2022-09-28 PROCEDURE — C1769 GUIDE WIRE: HCPCS | Performed by: SPECIALIST

## 2022-09-28 PROCEDURE — 94799 UNLISTED PULMONARY SVC/PX: CPT

## 2022-09-28 PROCEDURE — 36415 COLL VENOUS BLD VENIPUNCTURE: CPT | Performed by: SPECIALIST

## 2022-09-28 PROCEDURE — 99900026 HC AIRWAY MAINTENANCE (STAT)

## 2022-09-28 PROCEDURE — 36000713 HC OR TIME LEV V EA ADD 15 MIN: Performed by: SPECIALIST

## 2022-09-28 PROCEDURE — 36000712 HC OR TIME LEV V 1ST 15 MIN: Performed by: SPECIALIST

## 2022-09-28 PROCEDURE — 25000003 PHARM REV CODE 250: Performed by: NURSE ANESTHETIST, CERTIFIED REGISTERED

## 2022-09-28 PROCEDURE — 33859 PR GRAFT, ASC AORTA, W/CPB, W/VALVE SUSP, OTHER AORTIC DISEASE: ICD-10-PCS | Mod: ,,, | Performed by: SPECIALIST

## 2022-09-28 PROCEDURE — C1751 CATH, INF, PER/CENT/MIDLINE: HCPCS

## 2022-09-28 PROCEDURE — 33405 REPLACEMENT AORTIC VALVE OPN: CPT | Mod: 51,AS,,

## 2022-09-28 PROCEDURE — C1887 CATHETER, GUIDING: HCPCS | Performed by: SPECIALIST

## 2022-09-28 PROCEDURE — 36620 INSERTION CATHETER ARTERY: CPT

## 2022-09-28 PROCEDURE — S5010 5% DEXTROSE AND 0.45% SALINE: HCPCS

## 2022-09-28 PROCEDURE — P9016 RBC LEUKOCYTES REDUCED: HCPCS | Performed by: SPECIALIST

## 2022-09-28 PROCEDURE — 20000000 HC ICU ROOM

## 2022-09-28 PROCEDURE — 27800903 OPTIME MED/SURG SUP & DEVICES OTHER IMPLANTS: Performed by: SPECIALIST

## 2022-09-28 PROCEDURE — 94761 N-INVAS EAR/PLS OXIMETRY MLT: CPT

## 2022-09-28 PROCEDURE — 85014 HEMATOCRIT: CPT | Performed by: SPECIALIST

## 2022-09-28 PROCEDURE — 27000221 HC OXYGEN, UP TO 24 HOURS

## 2022-09-28 PROCEDURE — 37000009 HC ANESTHESIA EA ADD 15 MINS: Performed by: SPECIALIST

## 2022-09-28 PROCEDURE — 94003 VENT MGMT INPAT SUBQ DAY: CPT

## 2022-09-28 DEVICE — VALVE MOSAIC ULTRA AORTIC 25MM: Type: IMPLANTABLE DEVICE | Site: AORTA | Status: FUNCTIONAL

## 2022-09-28 RX ORDER — MAGNESIUM SULFATE HEPTAHYDRATE 40 MG/ML
2 INJECTION, SOLUTION INTRAVENOUS
Status: DISCONTINUED | OUTPATIENT
Start: 2022-09-28 | End: 2022-10-12 | Stop reason: HOSPADM

## 2022-09-28 RX ORDER — LIDOCAINE HYDROCHLORIDE 20 MG/ML
INJECTION INTRAVENOUS
Status: DISCONTINUED | OUTPATIENT
Start: 2022-09-28 | End: 2022-09-28

## 2022-09-28 RX ORDER — HYDROCODONE BITARTRATE AND ACETAMINOPHEN 5; 325 MG/1; MG/1
1 TABLET ORAL EVERY 4 HOURS PRN
Status: DISCONTINUED | OUTPATIENT
Start: 2022-09-28 | End: 2022-10-12 | Stop reason: HOSPADM

## 2022-09-28 RX ORDER — HEPARIN SODIUM 1000 [USP'U]/ML
INJECTION, SOLUTION INTRAVENOUS; SUBCUTANEOUS
Status: DISCONTINUED | OUTPATIENT
Start: 2022-09-28 | End: 2022-09-28

## 2022-09-28 RX ORDER — CALCIUM GLUCONATE 20 MG/ML
2 INJECTION, SOLUTION INTRAVENOUS
Status: DISCONTINUED | OUTPATIENT
Start: 2022-09-28 | End: 2022-10-12 | Stop reason: HOSPADM

## 2022-09-28 RX ORDER — NOREPINEPHRINE BITARTRATE 1 MG/ML
INJECTION, SOLUTION INTRAVENOUS
Status: DISCONTINUED | OUTPATIENT
Start: 2022-09-28 | End: 2022-09-28

## 2022-09-28 RX ORDER — MUPIROCIN 20 MG/G
OINTMENT TOPICAL 2 TIMES DAILY
Status: DISCONTINUED | OUTPATIENT
Start: 2022-09-28 | End: 2022-09-30

## 2022-09-28 RX ORDER — MORPHINE SULFATE 10 MG/ML
2 INJECTION INTRAMUSCULAR; INTRAVENOUS; SUBCUTANEOUS
Status: DISCONTINUED | OUTPATIENT
Start: 2022-09-28 | End: 2022-10-12 | Stop reason: HOSPADM

## 2022-09-28 RX ORDER — ETOMIDATE 2 MG/ML
INJECTION INTRAVENOUS
Status: DISCONTINUED | OUTPATIENT
Start: 2022-09-28 | End: 2022-09-28

## 2022-09-28 RX ORDER — MUPIROCIN 20 MG/G
OINTMENT TOPICAL
Status: COMPLETED | OUTPATIENT
Start: 2022-09-28 | End: 2022-09-28

## 2022-09-28 RX ORDER — IBUPROFEN 200 MG
1 CAPSULE ORAL DAILY
COMMUNITY
End: 2022-10-12

## 2022-09-28 RX ORDER — CALCIUM GLUCONATE 20 MG/ML
1 INJECTION, SOLUTION INTRAVENOUS
Status: DISCONTINUED | OUTPATIENT
Start: 2022-09-28 | End: 2022-10-12 | Stop reason: HOSPADM

## 2022-09-28 RX ORDER — FENTANYL CITRATE 50 UG/ML
INJECTION, SOLUTION INTRAMUSCULAR; INTRAVENOUS
Status: DISCONTINUED | OUTPATIENT
Start: 2022-09-28 | End: 2022-09-28

## 2022-09-28 RX ORDER — NITROGLYCERIN 20 MG/100ML
INJECTION INTRAVENOUS
Status: DISCONTINUED | OUTPATIENT
Start: 2022-09-28 | End: 2022-09-28

## 2022-09-28 RX ORDER — NITROGLYCERIN 20 MG/100ML
INJECTION INTRAVENOUS CONTINUOUS PRN
Status: DISCONTINUED | OUTPATIENT
Start: 2022-09-28 | End: 2022-09-28

## 2022-09-28 RX ORDER — LACTULOSE 10 G/15ML
20 SOLUTION ORAL EVERY 6 HOURS PRN
Status: DISCONTINUED | OUTPATIENT
Start: 2022-09-28 | End: 2022-10-12 | Stop reason: HOSPADM

## 2022-09-28 RX ORDER — HYDROCODONE BITARTRATE AND ACETAMINOPHEN 500; 5 MG/1; MG/1
TABLET ORAL
Status: DISCONTINUED | OUTPATIENT
Start: 2022-09-28 | End: 2022-10-12 | Stop reason: HOSPADM

## 2022-09-28 RX ORDER — CEFAZOLIN SODIUM 2 G/50ML
2 SOLUTION INTRAVENOUS
Status: CANCELLED | OUTPATIENT
Start: 2022-09-28 | End: 2022-09-29

## 2022-09-28 RX ORDER — POTASSIUM CHLORIDE 14.9 MG/ML
40 INJECTION INTRAVENOUS
Status: DISCONTINUED | OUTPATIENT
Start: 2022-09-28 | End: 2022-10-12 | Stop reason: HOSPADM

## 2022-09-28 RX ORDER — CALCIUM CHLORIDE INJECTION 100 MG/ML
INJECTION, SOLUTION INTRAVENOUS
Status: DISCONTINUED | OUTPATIENT
Start: 2022-09-28 | End: 2022-09-28

## 2022-09-28 RX ORDER — PROPOFOL 10 MG/ML
VIAL (ML) INTRAVENOUS
Status: DISCONTINUED | OUTPATIENT
Start: 2022-09-28 | End: 2022-09-28

## 2022-09-28 RX ORDER — PROTAMINE SULFATE 10 MG/ML
INJECTION, SOLUTION INTRAVENOUS
Status: DISCONTINUED | OUTPATIENT
Start: 2022-09-28 | End: 2022-09-28

## 2022-09-28 RX ORDER — ALBUMIN HUMAN 50 G/1000ML
12.5 SOLUTION INTRAVENOUS
Status: DISCONTINUED | OUTPATIENT
Start: 2022-09-28 | End: 2022-10-12 | Stop reason: HOSPADM

## 2022-09-28 RX ORDER — VANCOMYCIN HCL IN 5 % DEXTROSE 1G/250ML
1000 PLASTIC BAG, INJECTION (ML) INTRAVENOUS
Status: COMPLETED | OUTPATIENT
Start: 2022-09-28 | End: 2022-09-29

## 2022-09-28 RX ORDER — EPHEDRINE SULFATE 50 MG/ML
INJECTION, SOLUTION INTRAVENOUS
Status: DISCONTINUED | OUTPATIENT
Start: 2022-09-28 | End: 2022-09-28

## 2022-09-28 RX ORDER — ONDANSETRON 2 MG/ML
4 INJECTION INTRAMUSCULAR; INTRAVENOUS EVERY 12 HOURS PRN
Status: DISCONTINUED | OUTPATIENT
Start: 2022-09-28 | End: 2022-10-03

## 2022-09-28 RX ORDER — OXYCODONE HYDROCHLORIDE 5 MG/1
5 TABLET ORAL EVERY 4 HOURS PRN
Status: DISCONTINUED | OUTPATIENT
Start: 2022-09-28 | End: 2022-09-30

## 2022-09-28 RX ORDER — DESMOPRESSIN ACETATE 4 UG/ML
INJECTION, SOLUTION INTRAVENOUS; SUBCUTANEOUS
Status: DISCONTINUED | OUTPATIENT
Start: 2022-09-28 | End: 2022-09-28

## 2022-09-28 RX ORDER — PHENYLEPHRINE HYDROCHLORIDE 10 MG/ML
INJECTION INTRAVENOUS
Status: DISCONTINUED | OUTPATIENT
Start: 2022-09-28 | End: 2022-09-28

## 2022-09-28 RX ORDER — LOPERAMIDE HYDROCHLORIDE 2 MG/1
4 CAPSULE ORAL ONCE
Status: DISCONTINUED | OUTPATIENT
Start: 2022-09-28 | End: 2022-10-11 | Stop reason: SDUPTHER

## 2022-09-28 RX ORDER — MAGNESIUM SULFATE HEPTAHYDRATE 40 MG/ML
4 INJECTION, SOLUTION INTRAVENOUS
Status: DISCONTINUED | OUTPATIENT
Start: 2022-09-28 | End: 2022-10-12 | Stop reason: HOSPADM

## 2022-09-28 RX ORDER — DEXMEDETOMIDINE HYDROCHLORIDE 4 UG/ML
0-1.4 INJECTION, SOLUTION INTRAVENOUS CONTINUOUS
Status: DISCONTINUED | OUTPATIENT
Start: 2022-09-28 | End: 2022-09-29

## 2022-09-28 RX ORDER — TRANEXAMIC ACID 100 MG/ML
INJECTION, SOLUTION INTRAVENOUS
Status: DISCONTINUED | OUTPATIENT
Start: 2022-09-28 | End: 2022-09-28

## 2022-09-28 RX ORDER — ALBUMIN HUMAN 250 G/1000ML
SOLUTION INTRAVENOUS CONTINUOUS PRN
Status: DISCONTINUED | OUTPATIENT
Start: 2022-09-28 | End: 2022-09-28

## 2022-09-28 RX ORDER — METOPROLOL TARTRATE 25 MG/1
12.5 TABLET ORAL 2 TIMES DAILY
Status: DISCONTINUED | OUTPATIENT
Start: 2022-09-29 | End: 2022-10-03

## 2022-09-28 RX ORDER — DEXTROSE MONOHYDRATE AND SODIUM CHLORIDE 5; .45 G/100ML; G/100ML
INJECTION, SOLUTION INTRAVENOUS CONTINUOUS
Status: DISCONTINUED | OUTPATIENT
Start: 2022-09-28 | End: 2022-09-29

## 2022-09-28 RX ORDER — DIPHENHYDRAMINE HYDROCHLORIDE 50 MG/ML
INJECTION INTRAMUSCULAR; INTRAVENOUS
Status: DISCONTINUED | OUTPATIENT
Start: 2022-09-28 | End: 2022-09-28

## 2022-09-28 RX ORDER — VANCOMYCIN HCL IN 5 % DEXTROSE 1G/250ML
1000 PLASTIC BAG, INJECTION (ML) INTRAVENOUS ONCE
Status: COMPLETED | OUTPATIENT
Start: 2022-09-28 | End: 2022-09-28

## 2022-09-28 RX ORDER — VASOPRESSIN 20 [USP'U]/ML
INJECTION, SOLUTION INTRAMUSCULAR; SUBCUTANEOUS
Status: DISCONTINUED | OUTPATIENT
Start: 2022-09-28 | End: 2022-09-28

## 2022-09-28 RX ORDER — MIDAZOLAM HYDROCHLORIDE 1 MG/ML
INJECTION INTRAMUSCULAR; INTRAVENOUS
Status: DISCONTINUED | OUTPATIENT
Start: 2022-09-28 | End: 2022-09-28

## 2022-09-28 RX ORDER — DOCUSATE SODIUM 100 MG/1
100 CAPSULE, LIQUID FILLED ORAL 2 TIMES DAILY
Status: DISCONTINUED | OUTPATIENT
Start: 2022-09-28 | End: 2022-10-12

## 2022-09-28 RX ORDER — DEXMEDETOMIDINE HYDROCHLORIDE 100 UG/ML
INJECTION, SOLUTION INTRAVENOUS CONTINUOUS PRN
Status: DISCONTINUED | OUTPATIENT
Start: 2022-09-28 | End: 2022-09-28

## 2022-09-28 RX ORDER — POTASSIUM CHLORIDE 14.9 MG/ML
20 INJECTION INTRAVENOUS
Status: COMPLETED | OUTPATIENT
Start: 2022-09-28 | End: 2022-09-29

## 2022-09-28 RX ORDER — FAMOTIDINE 10 MG/ML
20 INJECTION INTRAVENOUS DAILY
Status: DISCONTINUED | OUTPATIENT
Start: 2022-09-28 | End: 2022-10-12

## 2022-09-28 RX ORDER — SODIUM CHLORIDE 0.9 % (FLUSH) 0.9 %
10 SYRINGE (ML) INJECTION
Status: DISCONTINUED | OUTPATIENT
Start: 2022-09-28 | End: 2022-10-12 | Stop reason: HOSPADM

## 2022-09-28 RX ORDER — KETOROLAC TROMETHAMINE 30 MG/ML
30 INJECTION, SOLUTION INTRAMUSCULAR; INTRAVENOUS EVERY 6 HOURS
Status: DISCONTINUED | OUTPATIENT
Start: 2022-09-28 | End: 2022-09-29

## 2022-09-28 RX ORDER — POTASSIUM CHLORIDE 14.9 MG/ML
20 INJECTION INTRAVENOUS
Status: DISCONTINUED | OUTPATIENT
Start: 2022-09-28 | End: 2022-10-12 | Stop reason: HOSPADM

## 2022-09-28 RX ORDER — ACETAMINOPHEN 650 MG/20.3ML
650 LIQUID ORAL EVERY 6 HOURS PRN
Status: DISCONTINUED | OUTPATIENT
Start: 2022-09-28 | End: 2022-09-30

## 2022-09-28 RX ORDER — LORATADINE 10 MG/1
10 TABLET ORAL DAILY
COMMUNITY

## 2022-09-28 RX ORDER — FOLIC ACID 1 MG/1
1 TABLET ORAL DAILY
Status: DISCONTINUED | OUTPATIENT
Start: 2022-09-28 | End: 2022-10-12 | Stop reason: HOSPADM

## 2022-09-28 RX ORDER — ENOXAPARIN SODIUM 100 MG/ML
40 INJECTION SUBCUTANEOUS EVERY 24 HOURS
Status: DISCONTINUED | OUTPATIENT
Start: 2022-09-28 | End: 2022-10-06

## 2022-09-28 RX ORDER — METOCLOPRAMIDE HYDROCHLORIDE 5 MG/ML
5 INJECTION INTRAMUSCULAR; INTRAVENOUS EVERY 6 HOURS PRN
Status: DISCONTINUED | OUTPATIENT
Start: 2022-09-28 | End: 2022-10-12 | Stop reason: HOSPADM

## 2022-09-28 RX ORDER — ROCURONIUM BROMIDE 10 MG/ML
INJECTION, SOLUTION INTRAVENOUS
Status: DISCONTINUED | OUTPATIENT
Start: 2022-09-28 | End: 2022-09-28

## 2022-09-28 RX ORDER — SUCRALFATE 1 G/1
1 TABLET ORAL
Status: DISCONTINUED | OUTPATIENT
Start: 2022-09-29 | End: 2022-10-12 | Stop reason: HOSPADM

## 2022-09-28 RX ORDER — CALCIUM GLUCONATE 20 MG/ML
3 INJECTION, SOLUTION INTRAVENOUS
Status: DISCONTINUED | OUTPATIENT
Start: 2022-09-28 | End: 2022-10-12 | Stop reason: HOSPADM

## 2022-09-28 RX ADMIN — KETOROLAC TROMETHAMINE 30 MG: 30 INJECTION, SOLUTION INTRAMUSCULAR; INTRAVENOUS at 03:09

## 2022-09-28 RX ADMIN — VANCOMYCIN HYDROCHLORIDE 1000 MG: 1 INJECTION, POWDER, LYOPHILIZED, FOR SOLUTION INTRAVENOUS at 05:09

## 2022-09-28 RX ADMIN — SODIUM CHLORIDE: 9 INJECTION, SOLUTION INTRAVENOUS at 06:09

## 2022-09-28 RX ADMIN — PHENYLEPHRINE HYDROCHLORIDE 200 MCG: 10 INJECTION INTRAVENOUS at 08:09

## 2022-09-28 RX ADMIN — DOCUSATE SODIUM 100 MG: 100 CAPSULE, LIQUID FILLED ORAL at 09:09

## 2022-09-28 RX ADMIN — KETOROLAC TROMETHAMINE 30 MG: 30 INJECTION, SOLUTION INTRAMUSCULAR; INTRAVENOUS at 11:09

## 2022-09-28 RX ADMIN — TRANEXAMIC ACID 700 MG: 100 INJECTION, SOLUTION INTRAVENOUS at 07:09

## 2022-09-28 RX ADMIN — ROCURONIUM BROMIDE 70 MG: 10 SOLUTION INTRAVENOUS at 07:09

## 2022-09-28 RX ADMIN — FENTANYL CITRATE 300 MCG: 50 INJECTION, SOLUTION INTRAMUSCULAR; INTRAVENOUS at 07:09

## 2022-09-28 RX ADMIN — DESMOPRESSIN ACETATE 24 MCG: 4 SOLUTION INTRAVENOUS at 09:09

## 2022-09-28 RX ADMIN — VASOPRESSIN 2 UNITS: 20 INJECTION INTRAVENOUS at 08:09

## 2022-09-28 RX ADMIN — EPINEPHRINE 0.02 MCG/KG/MIN: 1 INJECTION INTRAMUSCULAR; INTRAVENOUS; SUBCUTANEOUS at 09:09

## 2022-09-28 RX ADMIN — VASOPRESSIN 1 UNITS: 20 INJECTION INTRAVENOUS at 07:09

## 2022-09-28 RX ADMIN — ROCURONIUM BROMIDE 25 MG: 10 SOLUTION INTRAVENOUS at 09:09

## 2022-09-28 RX ADMIN — MIDAZOLAM HYDROCHLORIDE 1 MG: 1 INJECTION, SOLUTION INTRAMUSCULAR; INTRAVENOUS at 06:09

## 2022-09-28 RX ADMIN — NITROGLYCERIN 25 MCG: 20 INJECTION INTRAVENOUS at 09:09

## 2022-09-28 RX ADMIN — DIPHENHYDRAMINE HYDROCHLORIDE 50 MG: 50 INJECTION INTRAMUSCULAR; INTRAVENOUS at 08:09

## 2022-09-28 RX ADMIN — FENTANYL CITRATE 200 MCG: 50 INJECTION, SOLUTION INTRAMUSCULAR; INTRAVENOUS at 07:09

## 2022-09-28 RX ADMIN — ROCURONIUM BROMIDE 30 MG: 10 SOLUTION INTRAVENOUS at 07:09

## 2022-09-28 RX ADMIN — DEXMEDETOMIDINE HYDROCHLORIDE 0.5 MCG/KG/HR: 100 INJECTION, SOLUTION INTRAVENOUS at 09:09

## 2022-09-28 RX ADMIN — ALBUMIN (HUMAN) 12.5 G: 12.5 SOLUTION INTRAVENOUS at 03:09

## 2022-09-28 RX ADMIN — HEPARIN SODIUM 25000 UNITS: 1000 INJECTION, SOLUTION INTRAVENOUS; SUBCUTANEOUS at 07:09

## 2022-09-28 RX ADMIN — HYDROCODONE BITARTRATE AND ACETAMINOPHEN 1 TABLET: 5; 325 TABLET ORAL at 08:09

## 2022-09-28 RX ADMIN — ALBUMIN HUMAN: 250 SOLUTION INTRAVENOUS at 10:09

## 2022-09-28 RX ADMIN — CLEVIPIDINE 2 MG/HR: 0.5 EMULSION INTRAVENOUS at 11:09

## 2022-09-28 RX ADMIN — ETOMIDATE 20 MG: 2 INJECTION INTRAVENOUS at 07:09

## 2022-09-28 RX ADMIN — NOREPINEPHRINE BITARTRATE 4 MCG: 1 INJECTION, SOLUTION, CONCENTRATE INTRAVENOUS at 08:09

## 2022-09-28 RX ADMIN — MIDAZOLAM HYDROCHLORIDE 2 MG: 1 INJECTION, SOLUTION INTRAMUSCULAR; INTRAVENOUS at 08:09

## 2022-09-28 RX ADMIN — VANCOMYCIN HYDROCHLORIDE 1000 MG: 1 INJECTION, POWDER, LYOPHILIZED, FOR SOLUTION INTRAVENOUS at 06:09

## 2022-09-28 RX ADMIN — TRANEXAMIC ACID 700 MG: 100 INJECTION, SOLUTION INTRAVENOUS at 09:09

## 2022-09-28 RX ADMIN — CALCIUM CHLORIDE INJECTION 750 MG: 100 INJECTION, SOLUTION INTRAVENOUS at 09:09

## 2022-09-28 RX ADMIN — CLEVIPIDINE 5 MG/HR: 0.5 EMULSION INTRAVENOUS at 04:09

## 2022-09-28 RX ADMIN — LIDOCAINE HYDROCHLORIDE 80 MG: 20 INJECTION INTRAVENOUS at 07:09

## 2022-09-28 RX ADMIN — POTASSIUM CHLORIDE 40 MEQ: 14.9 INJECTION, SOLUTION INTRAVENOUS at 05:09

## 2022-09-28 RX ADMIN — MUPIROCIN: 20 OINTMENT TOPICAL at 05:09

## 2022-09-28 RX ADMIN — VASOPRESSIN 2 UNITS: 20 INJECTION INTRAVENOUS at 09:09

## 2022-09-28 RX ADMIN — POTASSIUM CHLORIDE 40 MEQ: 14.9 INJECTION, SOLUTION INTRAVENOUS at 11:09

## 2022-09-28 RX ADMIN — PROPOFOL 10 MG: 10 INJECTION, EMULSION INTRAVENOUS at 07:09

## 2022-09-28 RX ADMIN — PROTAMINE SULFATE 300 MG: 10 INJECTION, SOLUTION INTRAVENOUS at 09:09

## 2022-09-28 RX ADMIN — EPHEDRINE SULFATE 4 MG: 50 INJECTION INTRAVENOUS at 07:09

## 2022-09-28 RX ADMIN — INSULIN HUMAN 2 UNITS/HR: 1 INJECTION, SOLUTION INTRAVENOUS at 03:09

## 2022-09-28 RX ADMIN — DEXTROSE AND SODIUM CHLORIDE: 5; 450 INJECTION, SOLUTION INTRAVENOUS at 11:09

## 2022-09-28 RX ADMIN — MUPIROCIN: 20 OINTMENT TOPICAL at 09:09

## 2022-09-28 RX ADMIN — ROCURONIUM BROMIDE 25 MG: 10 SOLUTION INTRAVENOUS at 07:09

## 2022-09-28 RX ADMIN — ONDANSETRON 4 MG: 2 INJECTION INTRAMUSCULAR; INTRAVENOUS at 01:09

## 2022-09-28 RX ADMIN — SODIUM CHLORIDE 3 UNITS/HR: 9 INJECTION, SOLUTION INTRAVENOUS at 08:09

## 2022-09-28 RX ADMIN — VASOPRESSIN 0.06 UNITS/MIN: 20 INJECTION INTRAVENOUS at 08:09

## 2022-09-28 RX ADMIN — CALCIUM CHLORIDE INJECTION 250 MG: 100 INJECTION, SOLUTION INTRAVENOUS at 09:09

## 2022-09-28 NOTE — ANESTHESIA PROCEDURE NOTES
[facility]    Diagnosis: AI/Aortic aneurysm  Patient location during procedure: OR  Surgery related to: Post-Operative Surgical Evaluation  Exam type: Final    Staffing  Performed: anesthesiologist     Anesthesiologist: Gely Timmons MD        Anesthesiologist Present  Yes      Setup & InductionDoppler Echo: color flow mapping, 2D and 3D.      After adequate de-airing of the left ventricle, mild inotropic support with epinephrine was instituted, and the patient was weaned from cardiopulmonary bypass.      1. A bioprosthetic aortic valve is noted in good position without paravalvular leak.  No significant gradient, mean less than 2 mmHg is seen.    2. There is significant echo dropout in the ascending aorta secondary to an interposition graft.  The transverse or descending thoracic aorta remained normal, no dissection is observed.      3. There remains mild mitral regurgitation, unchanged     4. Normal right ventricular size and function     5. Left ventricular function remains adequate EF remains at about 55% again with mild inotropic support using epinephrine.      6. No other changes from the preoperative evaluation are noted with the exception of a surgical exclusion of left atrial appendage.    After reversal of the heparin with protamine closure of the chest with sternal wires, the exam was once again repeated.  No interval changes were noted at the conclusion of the operation the transesophageal echo probe was removed atraumatically and the patient brought uneventfully to the intensive care unit for further management.

## 2022-09-28 NOTE — ANESTHESIA PROCEDURE NOTES
Intubation    Date/Time: 9/28/2022 7:07 AM  Performed by: Kary Townsend  Authorized by: Gely Timmons MD     Intubation:     Induction:  Intravenous    Intubated:  Postinduction    Mask Ventilation:  Easy mask    Attempts:  1    Attempted By:  CRNA and student    Method of Intubation:  Video laryngoscopy    Blade:  Glidescope 3    Laryngeal View Grade: Grade IIA - cords partially seen      Difficult Airway Encountered?: No      Complications:  None    Airway Device:  Oral endotracheal tube    Airway Device Size:  8.0    Style/Cuff Inflation:  Cuffed (inflated to minimal occlusive pressure)    Inflation Amount (mL):  7    Tube secured:  23    Secured at:  The lips    Placement Verified By:  Capnometry and other (see comments)    Complicating Factors:  None    Findings Post-Intubation:  BS equal bilateral and atraumatic/condition of teeth unchanged

## 2022-09-28 NOTE — H&P
[facility] - Periop Services  Pulmonary Critical Care Note    Patient Name: Sydney Bacon  MRN: [id number]  Admission Date: 9/28/2022  Hospital Length of Stay: 0 days  Code Status: Full Code  Attending Provider: Wing Edmonds IV, MD  Primary Care Provider: Valente Bacon MD     Subjective:     HPI:   75 yo female with PMHx aortic and mitral insuffiency, HTN, nonischemic cardiomyopathy with EF 45% (Echo 7/12/22) admitted for cardiothoracic surgery following CT scan with dilated aortic aneurysm measuring 4.6-4.8cm. Had AVR with ascending aorta resection and repair by Dr. Edmonds 9/28/22.    Hospital Course/Significant events:  As above.    24 Hour Interval History:  As above.    Past Medical History:   Diagnosis Date    Hyperlipidemia     Hypertension     Hypothyroidism     Stenosis of aortic and mitral valves     Valvular regurgitation        Social History     Socioeconomic History    Marital status: Single   Tobacco Use    Smoking status: Never    Smokeless tobacco: Never   Substance and Sexual Activity    Alcohol use: Not Currently    Drug use: Never           Objective:     Current Outpatient Medications   Medication Instructions    b complex vitamins tablet 1 tablet, Oral, Daily    calcium citrate (CALCITRATE) 200 mg (950 mg) tablet 1 tablet, Oral, Daily    coenzyme Q10 100 mg, Oral, Daily    enzymes,digestive (DIGESTIVE ENZYMES ORAL) 1 capsule, Oral, Daily    famotidine (PEPCID) 20 mg, Oral, Daily PRN    furosemide (LASIX) 40 mg, Oral, Daily    glucos sul 2KCl/msm/chond/C/Mn (GLUCOSAMINE CHONDROITIN ORAL) 1 tablet, Oral, Daily    levoFLOXacin (LEVAQUIN) 500 mg, Oral, Daily    levothyroxine (SYNTHROID) 75 mcg, Oral, Daily    loratadine (CLARITIN) 10 mg, Oral, Daily    losartan (COZAAR) 100 mg, Oral, Daily    metoprolol succinate (TOPROL-XL) 50 mg, Oral, Daily    multivitamin with minerals tablet 1 tablet, Oral, Daily    potassium chloride SA (K-DUR,KLOR-CON) 20 MEQ tablet 20 mEq, Oral, Daily     raloxifene (EVISTA) 60 mg, Oral, Daily    simvastatin (ZOCOR) 20 mg, Oral, Nightly    spironolactone (ALDACTONE) 25 mg, Oral, Daily    vit A/vit C/vit E/zinc/copper (PRESERVISION AREDS ORAL) 1 capsule, Oral, 2 times daily    vitamin D (VITAMIN D3) 1,000 Units, Oral, 2 times daily       Current Inpatient Medications   docusate sodium  100 mg Oral BID    enoxaparin  40 mg Subcutaneous Daily    famotidine (PF)  20 mg Intravenous Daily    folic acid  1 mg Oral Daily    ketorolac  30 mg Intravenous Q6H    loperamide  4 mg Oral Once    [START ON 9/29/2022] metoprolol tartrate  12.5 mg Oral BID    mupirocin   Nasal BID    [START ON 9/29/2022] sucralfate  1 g Oral QID (AC & HS)    vancomycin (VANCOCIN) IVPB  1,000 mg Intravenous Q12H           Intake/Output Summary (Last 24 hours) at 9/28/2022 1113  Last data filed at 9/28/2022 1101  Gross per 24 hour   Intake 250 ml   Output 1989 ml   Net -1739 ml       Review of Systems   Unable to perform ROS: Intubated      Vital Signs (Most Recent):  Temp: 96.1 °F (35.6 °C) (09/28/22 1100)  Pulse: 80 (09/28/22 1100)  Resp: 20 (09/28/22 1100)  BP: 109/72 (09/28/22 1100)  SpO2: 97 % (09/28/22 1100)  Body mass index is 24.92 kg/m².  Weight: 63.8 kg (140 lb 10.5 oz) Vital Signs (24h Range):  Temp:  [95.9 °F (35.5 °C)-97.7 °F (36.5 °C)] 96.1 °F (35.6 °C)  Pulse:  [64-80] 80  Resp:  [16-20] 20  SpO2:  [94 %-100 %] 97 %  BP: ()/(51-76) 109/72  Arterial Line BP: ()/(53-58) 101/58     Physical Exam  Constitutional:       Appearance: Normal appearance.   Cardiovascular:      Rate and Rhythm: Normal rate and regular rhythm.      Heart sounds: Normal heart sounds.   Pulmonary:      Effort: No respiratory distress.      Breath sounds: No wheezing.   Skin:     General: Skin is warm and dry.   Neurological:      Comments: Unable to fully examine as patient is intubated.         Mechanical ventilation support:  Oxygen Concentration (%): 60 (09/28/22 1045)    Lines/Drains/Airways        Central Venous Catheter Line  Duration              Introducer with Double Lumen 09/28/22 0839 <1 day    Introducer 09/28/22 0839 <1 day    Percutaneous Central Line Insertion/Assessment - Triple Lumen  09/28/22 0740 <1 day              Drain  Duration                  Chest Tube 09/28/22 0910 1 Right Mediastinal 19 Fr. <1 day         Chest Tube 09/28/22 0921 2 Left Mediastinal 24 Fr. <1 day         NG/OG Tube 09/28/22 0740 orogastric 18 Fr. Center mouth <1 day         Urethral Catheter 09/28/22 0650 Latex 16 Fr. <1 day              Airway  Duration                  Airway - Non-Surgical 09/28/22 0707 <1 day              Arterial Line  Duration             Arterial Line 09/28/22 0634 Right Radial <1 day              Peripheral Intravenous Line  Duration                  Peripheral IV - Single Lumen 09/28/22 0540 18 G Left Antecubital <1 day                    Significant Labs:    Lab Results   Component Value Date    WBC 17.8 (H) 09/28/2022    HGB 14.2 09/28/2022    HCT 42.0 09/28/2022    MCV 92.7 09/28/2022     (L) 09/28/2022         BMP  Lab Results   Component Value Date     09/28/2022    K 3.0 (L) 09/28/2022    CO2 25 09/28/2022    BUN 11.2 09/28/2022    CREATININE 0.69 09/28/2022    CALCIUM 9.9 09/28/2022       ABG  Recent Labs   Lab 09/28/22  0656   PH 7.44   PO2 55*   PCO2 38   HCO3 25.8           Significant Imaging:  I have reviewed all pertinent imaging within the past 24 hours.        Assessment/Plan:     Assessment  Severe aortic insufficiency, ascending aortic aneurysm, dilated left ventricle s/p AVR and ascending aortic aneurysm resection and repair.  CAD  HTN  Hypothyroidism      Plan  Admit to ICU. Currently off sedation. Weaning to extubate.  Continue post-op care.  IV fluids. Will replace electrolytes as needed.  Patient currently normotensive with rate 70s-80s on On Cleviprex and pacing. Will get EKG post pacing.   Chest Tube in place, draining appropriately. Catheter in place with  125cc urine output.  Continue Cardiology recommendations. Will defer restarting home medications to Cardio team.    DVT Prophylaxis:  GI Prophylaxis:     Greater than 30 minutes of critical care was time spent personally by me on the following activities: development of treatment plan with patient or surrogate and bedside caregivers, discussions with consultants, evaluation of patient's response to treatment, examination of patient, ordering and performing treatments and interventions, ordering and review of laboratory studies, ordering and review of radiographic studies, pulse oximetry, re-evaluation of patient's condition.  This critical care time did not overlap with that of any other provider or involve time for any procedures.     Ciro Rg MD  Pulmonary Critical Care Medicine  [facility] - Periop Services

## 2022-09-28 NOTE — TRANSFER OF CARE
"Anesthesia Transfer of Care Note    Patient: Anetta Arleen    Procedure(s) Performed: Procedure(s) (LRB):  REPLACEMENT, AORTIC VALVE (N/A)  REPAIR, AORTA, ASCENDING (N/A)    Patient location: PACU (hEART recovered in PACU by ICU nurses)    Anesthesia Type: general    Transport from OR: Transported from OR intubated on 100% O2 by AMBU with assisted ventilation. Continuous ECG monitoring in transport. Continuous SpO2 monitoring in transport. Continuos invasive BP monitoring in transport    Post pain: adequate analgesia    Post assessment: no apparent anesthetic complications    Post vital signs: stable    Level of consciousness: sedated    Nausea/Vomiting: no nausea/vomiting    Complications: none    Transfer of care protocol was followed      Last vitals:   Visit Vitals  BP (!) 95/55 (BP Location: Right arm, Patient Position: Lying)   Pulse 80   Temp 36.5 °C (97.7 °F) (Skin)   Resp 20   Ht 5' 3" (1.6 m)   Wt 63.8 kg (140 lb 10.5 oz)   LMP  (LMP Unknown)   SpO2 100%   Breastfeeding No   BMI 24.92 kg/m²     "

## 2022-09-28 NOTE — ANESTHESIA PROCEDURE NOTES
MING    Diagnosis: Aortic Aneurysm/AI  Patient location during procedure: OR  Exam type: Baseline    Staffing  Performed: anesthesiologist     Anesthesiologist: Gely Timmons MD        Anesthesiologist Present  Yes      Setup & Induction  Patient preparation: bite block inserted  Probe Insertion: easy  Exam: completeDoppler Echo: 2D, 3D and color flow mapping.      After induction of general endotracheal anesthesia in the operating room, transesophageal echo probe was placed atraumatically and the esophagus for evaluation of cardiovascular structures.      1. The left ventricular outflow tract measures 28 mm, the aortic valve annulus measures 29 mm, the aortic root measures 39 mm in diameter, the sinotubular junction measures 38 mm in diameter and the ascending aorta measures 46 mm in diameter.  The distal arch and descending thoracic aorta have only grade 2 atheroma is rarely noted in the lower thoracic aorta and it is otherwise normal in caliber.    2. The aortic valve is a trileaflet valve with a dilated annulus and it appears that the left coronary cusp is failing to coapted with the left and non coronary cusps causing severe aortic insufficiency anteriorly directed.    3. The right atrium, left atrium and left atrial appendage are well visualized.  The left atrium is upper limits of normal in size, right atrium is normal in size.  There is no thrombus, tumor or evidence of an interatrial septal defect.      4. Tricuspid valve has trivial regurgitation.      5. Pulmonic valve has mild insufficiency noted is otherwise unremarkable.      6. Right ventricle is non dilated, and the right ventricular function remains normal.      7. The mitral valve annulus is upper limits of normal and there is mild mitral regurgitation noted.  No sclerosis or stenosis of the leaflets is noted.      8. The left ventricle is moderately dilated, the ejection fraction is calculated at 50 3%.  No obvious focal segmental wall motion  abnormalities are identified.    9. No pericardial or pleural effusions are seen.      The above findings were discussed with the surgeon prior to surgical incision.

## 2022-09-28 NOTE — ANESTHESIA PROCEDURE NOTES
Central Line    Diagnosis: Aortic Aneurysm  Patient location during procedure: done in OR  Timeout: 9/28/2022 7:08 AM  Procedure end time: 9/28/2022 7:11 AM    Staffing  Authorizing Provider: Gely Timmons MD  Performing Provider: Gely Timmons MD    Staffing  Performed: anesthesiologist   Anesthesiologist: Gely Timmons MD  Anesthesiologist was present at the time of the procedure.  Preanesthetic Checklist  Completed: patient identified, risks and benefits discussed, monitors and equipment checked, timeout performed and anesthesia consent given  Indication   Indication: hemodynamic monitoring, vascular access, med administration     Anesthesia   general anesthesia    Central Line   Skin Prep: skin prepped with ChloraPrep, skin prep agent completely dried prior to procedure  Sterile Barriers Followed: Yes    All five maximal barriers used- gloves, gown, cap, mask, and large sterile sheet    hand hygiene performed prior to central venous catheter insertion  Location: right internal jugular.   Catheter type: introducer  Catheter Size: 10 Fr  Inserted Catheter Length: 16 cm  Ultrasound: vascular probe with ultrasound   Vessel Caliber: medium, patent, compressibility normal  Needle advanced into vessel with real time Ultrasound guidance.  Image recorded and saved.  sterile gel and probe cover used in ultrasound-guided central venous catheter insertion  Manometry: none  Insertion Attempts: 1   Securement:line sutured, chlorhexidine patch, sterile dressing applied and blood return through all ports    Post-Procedure   X-Ray: successful placement   Adverse Events:none      Guidewire Guidewire removed intact.

## 2022-09-28 NOTE — ANESTHESIA PROCEDURE NOTES
Arterial    Diagnosis: Valvular Insufficienty, CAB    Patient location during procedure: holding area  Procedure start time: 9/28/2022 6:34 AM  Timeout: 9/28/2022 6:33 AM  Procedure end time: 9/28/2022 6:46 AM    Staffing  Authorizing Provider: Gely Tmimons MD  Performing Provider: Kary Townsend    Staffing  Other anesthesia staff: Kary Townsend  Anesthesiologist was present at the time of the procedure.    Preanesthetic Checklist  Completed: patient identified, IV checked, site marked, risks and benefits discussed, surgical consent, monitors and equipment checked, pre-op evaluation, timeout performed and anesthesia consent givenArterial  Skin Prep: chlorhexidine gluconate  Local Infiltration: lidocaine  Orientation: right  Location: radial    Catheter Size: 20 G  Catheter placement by Anatomical landmarks. Heme positive aspiration all ports. Insertion Attempts: 1  Assessment  Dressing: secured with tape and tegaderm  Patient: Tolerated well

## 2022-09-29 LAB
ANION GAP SERPL CALC-SCNC: 3 MEQ/L
BASOPHILS # BLD AUTO: 0.02 X10(3)/MCL (ref 0–0.2)
BASOPHILS NFR BLD AUTO: 0.2 %
BUN SERPL-MCNC: 8 MG/DL (ref 9.8–20.1)
CALCIUM SERPL-MCNC: 7.5 MG/DL (ref 8.4–10.2)
CHLORIDE SERPL-SCNC: 112 MMOL/L (ref 98–107)
CO2 SERPL-SCNC: 22 MMOL/L (ref 23–31)
CORRECTED TEMPERATURE (PCO2): 37 MMHG (ref 35–45)
CORRECTED TEMPERATURE (PH): 7.35 (ref 7.35–7.45)
CORRECTED TEMPERATURE (PO2): 59 MMHG (ref 80–100)
CREAT SERPL-MCNC: 0.62 MG/DL (ref 0.55–1.02)
CREAT/UREA NIT SERPL: 13
EOSINOPHIL # BLD AUTO: 0 X10(3)/MCL (ref 0–0.9)
EOSINOPHIL NFR BLD AUTO: 0 %
ERYTHROCYTE [DISTWIDTH] IN BLOOD BY AUTOMATED COUNT: 15 % (ref 11.5–17)
GFR SERPLBLD CREATININE-BSD FMLA CKD-EPI: >60 MLS/MIN/1.73/M2
GLUCOSE SERPL-MCNC: 110 MG/DL (ref 70–110)
GLUCOSE SERPL-MCNC: 145 MG/DL (ref 82–115)
HCO3 UR-SCNC: 20.4 MMOL/L (ref 22–26)
HCT VFR BLD AUTO: 40 % (ref 37–47)
HGB BLD-MCNC: 13.4 GM/DL (ref 12–16)
HGB BLD-MCNC: 13.7 G/DL (ref 12–16)
IMM GRANULOCYTES # BLD AUTO: 0.04 X10(3)/MCL (ref 0–0.04)
IMM GRANULOCYTES NFR BLD AUTO: 0.3 %
LYMPHOCYTES # BLD AUTO: 0.96 X10(3)/MCL (ref 0.6–4.6)
LYMPHOCYTES NFR BLD AUTO: 8.1 %
MCH RBC QN AUTO: 31.2 PG (ref 27–31)
MCHC RBC AUTO-ENTMCNC: 33.5 MG/DL (ref 33–36)
MCV RBC AUTO: 93.2 FL (ref 80–94)
MONOCYTES # BLD AUTO: 0.96 X10(3)/MCL (ref 0.1–1.3)
MONOCYTES NFR BLD AUTO: 8.1 %
NEUTROPHILS # BLD AUTO: 9.9 X10(3)/MCL (ref 2.1–9.2)
NEUTROPHILS NFR BLD AUTO: 83.3 %
NRBC BLD AUTO-RTO: 0 %
PCO2 BLDA: 37 MMHG (ref 35–45)
PH SMN: 7.35 [PH] (ref 7.35–7.45)
PLATELET # BLD AUTO: 120 X10(3)/MCL (ref 130–400)
PMV BLD AUTO: 10 FL (ref 7.4–10.4)
PO2 BLDA: 59 MMHG (ref 80–100)
POC BASE DEFICIT: -4.7 MMOL/L (ref -2–2)
POC COHB: 2.6 %
POC IONIZED CALCIUM: 1.1 MMOL/L (ref 1.12–1.23)
POC METHB: 0.3 % (ref 0.4–1.5)
POC O2HB: 92.4 % (ref 94–97)
POC SATURATED O2: 88.7 %
POC TEMPERATURE: 37 °C
POCT GLUCOSE: 102 MG/DL (ref 70–110)
POCT GLUCOSE: 108 MG/DL (ref 70–110)
POCT GLUCOSE: 110 MG/DL (ref 70–110)
POCT GLUCOSE: 110 MG/DL (ref 70–110)
POCT GLUCOSE: 122 MG/DL (ref 70–110)
POCT GLUCOSE: 123 MG/DL (ref 70–110)
POCT GLUCOSE: 124 MG/DL (ref 70–110)
POCT GLUCOSE: 129 MG/DL (ref 70–110)
POCT GLUCOSE: 133 MG/DL (ref 70–110)
POCT GLUCOSE: 133 MG/DL (ref 70–110)
POCT GLUCOSE: 136 MG/DL (ref 70–110)
POCT GLUCOSE: 137 MG/DL (ref 70–110)
POCT GLUCOSE: 99 MG/DL (ref 70–110)
POTASSIUM BLD-SCNC: 4.1 MMOL/L (ref 3.5–5)
POTASSIUM SERPL-SCNC: 4 MMOL/L (ref 3.5–5.1)
RBC # BLD AUTO: 4.29 X10(6)/MCL (ref 4.2–5.4)
SODIUM BLD-SCNC: 134 MMOL/L (ref 137–145)
SODIUM SERPL-SCNC: 137 MMOL/L (ref 136–145)
SPECIMEN SOURCE: ABNORMAL
WBC # SPEC AUTO: 11.9 X10(3)/MCL (ref 4.5–11.5)

## 2022-09-29 PROCEDURE — 85025 COMPLETE CBC W/AUTO DIFF WBC: CPT

## 2022-09-29 PROCEDURE — 25000003 PHARM REV CODE 250: Performed by: INTERNAL MEDICINE

## 2022-09-29 PROCEDURE — C9248 INJ, CLEVIDIPINE BUTYRATE: HCPCS | Mod: JG | Performed by: SPECIALIST

## 2022-09-29 PROCEDURE — 63600175 PHARM REV CODE 636 W HCPCS: Performed by: SPECIALIST

## 2022-09-29 PROCEDURE — 27000221 HC OXYGEN, UP TO 24 HOURS

## 2022-09-29 PROCEDURE — 80048 BASIC METABOLIC PNL TOTAL CA: CPT

## 2022-09-29 PROCEDURE — 94760 N-INVAS EAR/PLS OXIMETRY 1: CPT

## 2022-09-29 PROCEDURE — 63600175 PHARM REV CODE 636 W HCPCS: Performed by: INTERNAL MEDICINE

## 2022-09-29 PROCEDURE — S5010 5% DEXTROSE AND 0.45% SALINE: HCPCS

## 2022-09-29 PROCEDURE — 63600175 PHARM REV CODE 636 W HCPCS

## 2022-09-29 PROCEDURE — P9045 ALBUMIN (HUMAN), 5%, 250 ML: HCPCS | Mod: JG

## 2022-09-29 PROCEDURE — 94761 N-INVAS EAR/PLS OXIMETRY MLT: CPT

## 2022-09-29 PROCEDURE — 37799 UNLISTED PX VASCULAR SURGERY: CPT

## 2022-09-29 PROCEDURE — 20000000 HC ICU ROOM

## 2022-09-29 PROCEDURE — 99900035 HC TECH TIME PER 15 MIN (STAT)

## 2022-09-29 PROCEDURE — 36415 COLL VENOUS BLD VENIPUNCTURE: CPT

## 2022-09-29 PROCEDURE — 25000003 PHARM REV CODE 250

## 2022-09-29 PROCEDURE — 82803 BLOOD GASES ANY COMBINATION: CPT

## 2022-09-29 PROCEDURE — 97110 THERAPEUTIC EXERCISES: CPT

## 2022-09-29 RX ORDER — FUROSEMIDE 10 MG/ML
40 INJECTION INTRAMUSCULAR; INTRAVENOUS ONCE
Status: COMPLETED | OUTPATIENT
Start: 2022-09-29 | End: 2022-09-29

## 2022-09-29 RX ORDER — IBUPROFEN 200 MG
24 TABLET ORAL
Status: DISCONTINUED | OUTPATIENT
Start: 2022-09-29 | End: 2022-10-02

## 2022-09-29 RX ORDER — IBUPROFEN 200 MG
16 TABLET ORAL
Status: DISCONTINUED | OUTPATIENT
Start: 2022-09-29 | End: 2022-10-02

## 2022-09-29 RX ORDER — GLUCAGON 1 MG
1 KIT INJECTION
Status: DISCONTINUED | OUTPATIENT
Start: 2022-09-29 | End: 2022-10-02

## 2022-09-29 RX ORDER — NITROFURANTOIN 25; 75 MG/1; MG/1
100 CAPSULE ORAL EVERY 12 HOURS
Status: DISCONTINUED | OUTPATIENT
Start: 2022-09-29 | End: 2022-09-30

## 2022-09-29 RX ORDER — INSULIN ASPART 100 [IU]/ML
0-5 INJECTION, SOLUTION INTRAVENOUS; SUBCUTANEOUS EVERY 4 HOURS
Status: DISCONTINUED | OUTPATIENT
Start: 2022-09-29 | End: 2022-10-02

## 2022-09-29 RX ADMIN — METOPROLOL TARTRATE 12.5 MG: 25 TABLET, FILM COATED ORAL at 08:09

## 2022-09-29 RX ADMIN — KETOROLAC TROMETHAMINE 30 MG: 30 INJECTION, SOLUTION INTRAMUSCULAR; INTRAVENOUS at 06:09

## 2022-09-29 RX ADMIN — SUCRALFATE 1 G: 1 TABLET ORAL at 08:09

## 2022-09-29 RX ADMIN — NITROFURANTOIN (MONOHYDRATE/MACROCRYSTALS) 100 MG: 75; 25 CAPSULE ORAL at 09:09

## 2022-09-29 RX ADMIN — FUROSEMIDE 40 MG: 10 INJECTION, SOLUTION INTRAMUSCULAR; INTRAVENOUS at 08:09

## 2022-09-29 RX ADMIN — FOLIC ACID 1 MG: 1 TABLET ORAL at 08:09

## 2022-09-29 RX ADMIN — ALBUMIN (HUMAN) 12.5 G: 12.5 SOLUTION INTRAVENOUS at 01:09

## 2022-09-29 RX ADMIN — VANCOMYCIN HYDROCHLORIDE 1000 MG: 1 INJECTION, POWDER, LYOPHILIZED, FOR SOLUTION INTRAVENOUS at 05:09

## 2022-09-29 RX ADMIN — ENOXAPARIN SODIUM 40 MG: 40 INJECTION SUBCUTANEOUS at 04:09

## 2022-09-29 RX ADMIN — SUCRALFATE 1 G: 1 TABLET ORAL at 10:09

## 2022-09-29 RX ADMIN — SUCRALFATE 1 G: 1 TABLET ORAL at 06:09

## 2022-09-29 RX ADMIN — NITROFURANTOIN (MONOHYDRATE/MACROCRYSTALS) 100 MG: 75; 25 CAPSULE ORAL at 08:09

## 2022-09-29 RX ADMIN — DOCUSATE SODIUM 100 MG: 100 CAPSULE, LIQUID FILLED ORAL at 08:09

## 2022-09-29 RX ADMIN — MUPIROCIN: 20 OINTMENT TOPICAL at 08:09

## 2022-09-29 RX ADMIN — HYDROCODONE BITARTRATE AND ACETAMINOPHEN 1 TABLET: 5; 325 TABLET ORAL at 10:09

## 2022-09-29 RX ADMIN — HYDROCODONE BITARTRATE AND ACETAMINOPHEN 1 TABLET: 5; 325 TABLET ORAL at 04:09

## 2022-09-29 RX ADMIN — CLEVIPIDINE 3 MG/HR: 0.5 EMULSION INTRAVENOUS at 03:09

## 2022-09-29 RX ADMIN — DEXTROSE AND SODIUM CHLORIDE: 5; 450 INJECTION, SOLUTION INTRAVENOUS at 03:09

## 2022-09-29 RX ADMIN — FAMOTIDINE 20 MG: 10 INJECTION INTRAVENOUS at 08:09

## 2022-09-29 RX ADMIN — POTASSIUM CHLORIDE 20 MEQ: 14.9 INJECTION, SOLUTION INTRAVENOUS at 04:09

## 2022-09-29 RX ADMIN — KETOROLAC TROMETHAMINE 30 MG: 30 INJECTION, SOLUTION INTRAMUSCULAR; INTRAVENOUS at 12:09

## 2022-09-29 RX ADMIN — VANCOMYCIN HYDROCHLORIDE 1000 MG: 1 INJECTION, POWDER, LYOPHILIZED, FOR SOLUTION INTRAVENOUS at 06:09

## 2022-09-29 NOTE — PROGRESS NOTES
09/29/22 1215   Pre Exercise Vitals   /73   Pulse 77   Supplemental O2? Yes   O2 Device oxymask   O2 Flow (L/min) 9   SpO2 94 %   During Exercise Vitals   Pulse 80   Supplemental O2? Yes   O2 Device oxymask   O2 Flow (L/min) 9   SpO2 92 %   Distance Walked 6 feet   Post Exercise Vitals   /73   Pulse 79   Supplemental O2? Yes   O2 Device oxymask   O2 Flow (L/min) 9   SpO2 94 %   Assist x2 from sittiing to standing. Sternal precautions maintained.Ambulated 6 feet from chair to bed. Mild sob. Nurse in room assisting with activity.

## 2022-09-29 NOTE — ANESTHESIA POSTPROCEDURE EVALUATION
Anesthesia Post Evaluation    Patient: Anetta Arleen    Procedure(s) Performed: Procedure(s) (LRB):  REPLACEMENT, AORTIC VALVE (N/A)  REPAIR, AORTA, ASCENDING (N/A)    Final Anesthesia Type: general      Patient location during evaluation: PACU  Patient participation: Yes- Able to Participate  Level of consciousness: awake and alert  Post-procedure vital signs: reviewed and stable  Pain management: adequate  Airway patency: patent      Anesthetic complications: no      Cardiovascular status: hemodynamically stable  Respiratory status: unassisted  Hydration status: euvolemic  Follow-up not needed.          Vitals Value Taken Time   /76 09/29/22 1501   Temp 36.7 °C (98.1 °F) 09/29/22 1200   Pulse 79 09/29/22 1519   Resp 19 09/29/22 1519   SpO2 95 % 09/29/22 1519   Vitals shown include unvalidated device data.      No case tracking events are documented in the log.      Pain/Arnav Score: Pain Rating Prior to Med Admin: 0 (9/29/2022 12:52 PM)  Pain Rating Post Med Admin: 2 (9/29/2022  6:15 AM)

## 2022-09-29 NOTE — PLAN OF CARE
09/29/22 2829   Discharge Assessment   Assessment Type Discharge Planning Assessment   Confirmed/corrected address, phone number and insurance Yes   Confirmed Demographics Correct on Facesheet   Source of Information patient;family  (pt's nephewRachid)   Reason For Admission s/p AVR   Lives With alone  (Pt lives alone in an apartment on the first floor)   Do you expect to return to your current living situation? No  (Pt would like to be placed before returning home)   Do you have help at home or someone to help you manage your care at home? No   Prior to hospitilization cognitive status: Alert/Oriented   Current cognitive status: Alert/Oriented   Walking or Climbing Stairs Difficulty none   Dressing/Bathing Difficulty none   Home Layout Able to live on 1st floor   Equipment Currently Used at Home none  (pt is independent with mobility)   Patient currently being followed by outpatient case management? No   Do you currently have service(s) that help you manage your care at home? No   Who is going to help you get home at discharge? pt's nephewRachid, however pt would like to be placed.   How do you get to doctors appointments? car, drives self   Are you on dialysis? No   Discharge Plan A   (rehab vs SNF vs swing bed)   Discharge Plan B   (TBD once therapy works with pt)   DME Needed Upon Discharge  other (see comments)  (TBD)   Discharge Plan discussed with: Patient  (pt's nephewRachid)   Discharge Barriers Identified None   Housing Stability   In the last 12 months, was there a time when you were not able to pay the mortgage or rent on time? N   Transportation Needs   In the past 12 months, has lack of transportation kept you from medical appointments or from getting medications? no   Food Insecurity   Within the past 12 months, you worried that your food would run out before you got the money to buy more. Never true     Pt's PCP is Dr Valente Bacon who is located in [place]. Her only family member is  her nephew, Rachid ([phone]). Pt was independent with mobility and was driving and volunteering at the [facility] [employer] [facility]. 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   Spoke with pt and pt's nephew re rehab vs SNF vs swing bed. Pt would like to be placed before going home. Wait for therapy recommendations.

## 2022-09-29 NOTE — PROGRESS NOTES
CT SURGERY PROGRESS NOTE  Sydney Bacon  76 y.o.  1945    Patients Procedure: Procedure(s) (LRB):  REPLACEMENT, AORTIC VALVE (N/A)  REPAIR, AORTA, ASCENDING (N/A)    Subjective  Interval History: NAEO. Patient sitting OOB with Oxymask weaning from 12L. Says she feels okay. Given lasix 20mg IV based on sbp and o2 sat. Cxr looked good some pleural effusion. ICU started patient on abx for UTI.    Review of Systems   Constitutional: Negative.    Respiratory:  Negative for cough, sputum production and shortness of breath.    Cardiovascular:  Negative for chest pain, palpitations, claudication and leg swelling.   Gastrointestinal:  Negative for abdominal pain, nausea and vomiting.   Genitourinary: Negative.    Skin: Negative.         Incision C/D/I     Medication List  Infusions    Scheduled   docusate sodium  100 mg Oral BID    enoxaparin  40 mg Subcutaneous Daily    famotidine (PF)  20 mg Intravenous Daily    folic acid  1 mg Oral Daily    insulin aspart U-100  0-5 Units Subcutaneous Q4H    loperamide  4 mg Oral Once    metoprolol tartrate  12.5 mg Oral BID    mupirocin   Nasal BID    nitrofurantoin (macrocrystal-monohydrate)  100 mg Oral Q12H    sucralfate  1 g Oral QID (AC & HS)    vancomycin (VANCOCIN) IVPB  1,000 mg Intravenous Q12H       Objective:  Recent Vitals:  Temp:  [97.7 °F (36.5 °C)-99.3 °F (37.4 °C)] 98.1 °F (36.7 °C)  Pulse:  [] 78  Resp:  [10-33] 22  SpO2:  [86 %-96 %] 96 %  BP: ()/(50-96) 138/68  Arterial Line BP: ()/(40-67) 136/59    Physical Exam  Vitals and nursing note reviewed.   Constitutional:       General: She is awake. She is not in acute distress.     Appearance: Normal appearance. She is not toxic-appearing or diaphoretic.   HENT:      Head: Normocephalic and atraumatic.   Eyes:      Extraocular Movements: Extraocular movements intact.      Pupils: Pupils are equal, round, and reactive to light.   Cardiovascular:      Rate and Rhythm: Normal rate and regular rhythm.       Pulses: Normal pulses.           Radial pulses are 2+ on the right side and 2+ on the left side.        Dorsalis pedis pulses are 2+ on the right side and 2+ on the left side.      Heart sounds: Normal heart sounds.   Pulmonary:      Effort: Pulmonary effort is normal.      Breath sounds: Normal breath sounds.   Musculoskeletal:      Right lower leg: No edema.      Left lower leg: No edema.   Skin:     General: Skin is warm and dry.      Comments: INCISION C/D/I   Neurological:      General: No focal deficit present.      Mental Status: She is alert and oriented to person, place, and time.   Psychiatric:         Mood and Affect: Mood and affect normal.        I/O last 24 hrs:  Intake/Output - Last 3 Shifts         09/27 0700 09/28 0659 09/28 0700 09/29 0659 09/29 0700 09/30 0659    P.O.   200    I.V. (mL/kg)  3348 (49.7) 265 (3.9)    IV Piggyback  90 6    Total Intake(mL/kg)  3438 (51.1) 471 (7)    Urine (mL/kg/hr)  3505 (2.2) 860 (1.6)    Drains  1034     Chest Tube  420 80    Total Output  4959 940    Net  -1522 -809                   Labs  BMP:   Recent Labs   Lab 09/29/22  0136      K 4.0   CO2 22*   BUN 8.0*   CREATININE 0.62   CALCIUM 7.5*     CBC:   Recent Labs   Lab 09/29/22  0136   WBC 11.9*   RBC 4.29   HGB 13.4   HCT 40.0   *   MCV 93.2   MCH 31.2*   MCHC 33.5     All pertinent labs from the last 24 hours have been reviewed.      Imaging:   CXR: X-Ray Chest AP Portable    Result Date: 9/29/2022  Mild bibasilar atelectasis with possible small pleural effusions. Electronically signed by: Ivan Chan Date:    09/29/2022 Time:    07:20   I have reviewed all pertinent imaging results/findings within the past 24 hours.        ASSESSMENT/PLAN:  - cont ICU care  - ABX for UTI  - IS/OOB/Ambulate  - wean O2 as able.     Case and plan of care discussed with MD Eliseo Lentz PA-C

## 2022-09-29 NOTE — NURSING
Nurses Note -- 4 Eyes      9/29/2022   12:51 PM      Skin assessed during: Admit      [] No Pressure Injuries Present    []Prevention Measures Documented      [] Yes- Altered Skin Integrity Present or Discovered   [] LDA Added if Not in Epic (Describe Wound)   [] New Altered Skin Integrity was Present on Admit and Documented in LDA   [] Wound Image Taken    Wound Care Consulted? No    Attending Nurse:  Lianna Brush RN     Second RN/Staff Member:  MEKA Solares RN

## 2022-09-29 NOTE — PROGRESS NOTES
[facility] - Peri Services  Pulmonary Critical Care Note    Patient Name: Sydney aBcon  MRN: [id number]  Admission Date: 9/28/2022  Hospital Length of Stay: 1 days  Code Status: Full Code  Attending Provider: Wing Edmonds IV, MD  Primary Care Provider: Valente Bacon MD     Subjective:     HPI:   77 yo female with PMHx aortic and mitral insuffiency, HTN, nonischemic cardiomyopathy with EF 45% (Echo 7/12/22) admitted for cardiothoracic surgery following CT scan with dilated aortic aneurysm measuring 4.6-4.8cm. Had AVR with ascending aorta resection and repair by Dr. Edmonds 9/28/22.    Hospital Course/Significant events:  7/28: AVR with Dr Edmonds, admitted to ICU for post op care    24 Hour Interval History:  Overnight the patient had no complaints or acute events. No longer on IV fluids, chest tube and cottrell removed on same day of surgery. Complaints of intermittent nausea relieved by drinking ice water.    Past Medical History:   Diagnosis Date    Hyperlipidemia     Hypertension     Hypothyroidism     Stenosis of aortic and mitral valves     Valvular regurgitation        Social History     Socioeconomic History    Marital status: Single   Tobacco Use    Smoking status: Never    Smokeless tobacco: Never   Substance and Sexual Activity    Alcohol use: Not Currently    Drug use: Never           Objective:     Current Outpatient Medications   Medication Instructions    b complex vitamins tablet 1 tablet, Oral, Daily    calcium citrate (CALCITRATE) 200 mg (950 mg) tablet 1 tablet, Oral, Daily    coenzyme Q10 100 mg, Oral, Daily    enzymes,digestive (DIGESTIVE ENZYMES ORAL) 1 capsule, Oral, Daily    famotidine (PEPCID) 20 mg, Oral, Daily PRN    furosemide (LASIX) 40 mg, Oral, Daily    glucos sul 2KCl/msm/chond/C/Mn (GLUCOSAMINE CHONDROITIN ORAL) 1 tablet, Oral, Daily    levoFLOXacin (LEVAQUIN) 500 mg, Oral, Daily    levothyroxine (SYNTHROID) 75 mcg, Oral, Daily    loratadine (CLARITIN) 10 mg,  Oral, Daily    losartan (COZAAR) 100 mg, Oral, Daily    metoprolol succinate (TOPROL-XL) 50 mg, Oral, Daily    multivitamin with minerals tablet 1 tablet, Oral, Daily    potassium chloride SA (K-DUR,KLOR-CON) 20 MEQ tablet 20 mEq, Oral, Daily    raloxifene (EVISTA) 60 mg, Oral, Daily    simvastatin (ZOCOR) 20 mg, Oral, Nightly    spironolactone (ALDACTONE) 25 mg, Oral, Daily    vit A/vit C/vit E/zinc/copper (PRESERVISION AREDS ORAL) 1 capsule, Oral, 2 times daily    vitamin D (VITAMIN D3) 1,000 Units, Oral, 2 times daily       Current Inpatient Medications   docusate sodium  100 mg Oral BID    enoxaparin  40 mg Subcutaneous Daily    famotidine (PF)  20 mg Intravenous Daily    folic acid  1 mg Oral Daily    ketorolac  30 mg Intravenous Q6H    loperamide  4 mg Oral Once    metoprolol tartrate  12.5 mg Oral BID    mupirocin   Nasal BID    sucralfate  1 g Oral QID (AC & HS)    vancomycin (VANCOCIN) IVPB  1,000 mg Intravenous Q12H           Intake/Output Summary (Last 24 hours) at 9/29/2022 0616  Last data filed at 9/29/2022 0500  Gross per 24 hour   Intake 2848 ml   Output 4729 ml   Net -1881 ml         Review of Systems   Constitutional:  Negative for fever.   Cardiovascular:  Negative for chest pain and palpitations.   Gastrointestinal:  Positive for nausea. Negative for abdominal pain and vomiting.   Musculoskeletal:  Negative for back pain and neck pain.   Neurological:  Negative for dizziness and headaches.      Vital Signs (Most Recent):  Temp: 98.8 °F (37.1 °C) (09/29/22 0400)  Pulse: 81 (09/29/22 0545)  Resp: (!) 21 (09/29/22 0545)  BP: (!) 108/56 (09/29/22 0545)  SpO2: (!) 92 % (09/29/22 0545)  Body mass index is 24.92 kg/m².  Weight: 63.8 kg (140 lb 10.5 oz) Vital Signs (24h Range):  Temp:  [95.9 °F (35.5 °C)-99.3 °F (37.4 °C)] 98.8 °F (37.1 °C)  Pulse:  [] 81  Resp:  [11-33] 21  SpO2:  [86 %-100 %] 92 %  BP: ()/(52-96) 108/56  Arterial Line BP: ()/(40-71) 119/51     Physical  Exam  Constitutional:       Appearance: Normal appearance.   Cardiovascular:      Rate and Rhythm: Normal rate and regular rhythm.      Heart sounds: Normal heart sounds.   Pulmonary:      Effort: No respiratory distress.      Breath sounds: No wheezing.   Skin:     General: Skin is warm and dry.         Mechanical ventilation support:  Vent Mode: CPAP PSV-Volume (09/28/22 1643)  Set Rate: 8 BPM (09/28/22 1545)  Vt Set: 500 mL (09/28/22 1545)  Pressure Support: 10 cmH20 (09/28/22 1643)  PEEP/CPAP: 5 cmH20 (09/28/22 1643)  Oxygen Concentration (%): 35 (09/28/22 1643)  Peak Airway Pressure: 18 cmH2O (09/28/22 1414)  Total Ve: 9.3 mL (09/28/22 1643)  F/VT Ratio<105 (RSBI): (!) 45.95 (09/28/22 1643)    Lines/Drains/Airways       Central Venous Catheter Line  Duration              Introducer with Double Lumen 09/28/22 0839 <1 day    Introducer 09/28/22 0839 <1 day    Percutaneous Central Line Insertion/Assessment - Triple Lumen  09/28/22 0740 <1 day              Drain  Duration                  Chest Tube 09/28/22 0910 1 Right Mediastinal 19 Fr. <1 day         Chest Tube 09/28/22 0921 2 Left Mediastinal 24 Fr. <1 day         NG/OG Tube 09/28/22 0740 orogastric 18 Fr. Center mouth <1 day         Urethral Catheter 09/28/22 0650 Latex 16 Fr. <1 day              Airway  Duration                  Airway - Non-Surgical 09/28/22 0707 <1 day              Arterial Line  Duration             Arterial Line 09/28/22 0634 Right Radial <1 day              Peripheral Intravenous Line  Duration                  Peripheral IV - Single Lumen 09/28/22 0540 18 G Left Antecubital 1 day                    Significant Labs:    Lab Results   Component Value Date    WBC 11.9 (H) 09/29/2022    HGB 13.4 09/29/2022    HCT 40.0 09/29/2022    MCV 93.2 09/29/2022     (L) 09/29/2022         BMP  Lab Results   Component Value Date     09/29/2022    K 4.0 09/29/2022    CO2 22 (L) 09/29/2022    BUN 8.0 (L) 09/29/2022    CREATININE 0.62  09/29/2022    CALCIUM 7.5 (L) 09/29/2022       ABG  Recent Labs   Lab 09/28/22  1653   PH 7.36   PO2 67*   PCO2 39   HCO3 22.0             Significant Imaging:  I have reviewed all pertinent imaging within the past 24 hours.        Assessment/Plan:     Assessment  Severe aortic insufficiency, ascending aortic aneurysm, dilated left ventricle s/p AVR and ascending aortic aneurysm resection and repair.  CAD  HTN  Hypothyroidism      Plan  - Continue ICU post-op care.  - post op EKG significant for bradycardia with 1st degree heart block  - postprocedure chest x-ray with evidence of vascular congestion, no focal infiltrates or pneumothorax. CXR from this morning in process.  - Continue to replace electrolytes as needed.  - this AM, pt normotensive with HR in 80s.  - remainder of management per CT surgery.    DVT Prophylaxis: lovenox  GI Prophylaxis: pepcid     Greater than 30 minutes of critical care was time spent personally by me on the following activities: development of treatment plan with patient or surrogate and bedside caregivers, discussions with consultants, evaluation of patient's response to treatment, examination of patient, ordering and performing treatments and interventions, ordering and review of laboratory studies, ordering and review of radiographic studies, pulse oximetry, re-evaluation of patient's condition.  This critical care time did not overlap with that of any other provider or involve time for any procedures.     Kimberly Whiting DO  Pulmonary Critical Care Medicine  [facility] - [facility]

## 2022-09-29 NOTE — PROGRESS NOTES
[facility] - [facility]  Pulmonary Critical Care Note    Patient Name: Sydney Bacon  MRN: [id number]  Admission Date: 9/28/2022  Hospital Length of Stay: 1 days  Code Status: Full Code  Attending Provider: Wing Edmonds IV, MD  Primary Care Provider: Valente Bacon MD     Subjective:       HPI:   75 yo female with PMHx aortic and mitral insuffiency, HTN, nonischemic cardiomyopathy with EF 45% (Echo 7/12/22) admitted for cardiothoracic surgery following CT scan with dilated aortic aneurysm measuring 4.6-4.8cm. Had AVR with ascending aorta resection and repair by Dr. Edmonds 9/28/22.     Hospital Course/Significant events:  7/28: AVR with Dr Edmonds     24 Hour Interval History:  No acute events overnight.  Interval extubation, but required some increased oxygen support via 12 L OxyMask.  Continues to have shallow breathing.        Past Medical History:   Diagnosis Date    Hyperlipidemia     Hypertension     Hypothyroidism     Stenosis of aortic and mitral valves     Valvular regurgitation        Past Surgical History:   Procedure Laterality Date    CATARACT EXTRACTION Bilateral     COLONOSCOPY      LEFT HEART CATHETERIZATION         Social History     Socioeconomic History    Marital status: Single   Tobacco Use    Smoking status: Never    Smokeless tobacco: Never   Substance and Sexual Activity    Alcohol use: Not Currently    Drug use: Never           Current Outpatient Medications   Medication Instructions    b complex vitamins tablet 1 tablet, Oral, Daily    calcium citrate (CALCITRATE) 200 mg (950 mg) tablet 1 tablet, Oral, Daily    coenzyme Q10 100 mg, Oral, Daily    enzymes,digestive (DIGESTIVE ENZYMES ORAL) 1 capsule, Oral, Daily    famotidine (PEPCID) 20 mg, Oral, Daily PRN    furosemide (LASIX) 40 mg, Oral, Daily    glucos sul 2KCl/msm/chond/C/Mn (GLUCOSAMINE CHONDROITIN ORAL) 1 tablet, Oral, Daily    levoFLOXacin (LEVAQUIN) 500 mg, Oral, Daily    levothyroxine (SYNTHROID) 75 mcg,  Oral, Daily    loratadine (CLARITIN) 10 mg, Oral, Daily    losartan (COZAAR) 100 mg, Oral, Daily    metoprolol succinate (TOPROL-XL) 50 mg, Oral, Daily    multivitamin with minerals tablet 1 tablet, Oral, Daily    potassium chloride SA (K-DUR,KLOR-CON) 20 MEQ tablet 20 mEq, Oral, Daily    raloxifene (EVISTA) 60 mg, Oral, Daily    simvastatin (ZOCOR) 20 mg, Oral, Nightly    spironolactone (ALDACTONE) 25 mg, Oral, Daily    vit A/vit C/vit E/zinc/copper (PRESERVISION AREDS ORAL) 1 capsule, Oral, 2 times daily    vitamin D (VITAMIN D3) 1,000 Units, Oral, 2 times daily       Current Inpatient Medications   docusate sodium  100 mg Oral BID    enoxaparin  40 mg Subcutaneous Daily    famotidine (PF)  20 mg Intravenous Daily    folic acid  1 mg Oral Daily    insulin aspart U-100  0-5 Units Subcutaneous Q4H    ketorolac  30 mg Intravenous Q6H    loperamide  4 mg Oral Once    metoprolol tartrate  12.5 mg Oral BID    mupirocin   Nasal BID    nitrofurantoin (macrocrystal-monohydrate)  100 mg Oral Q12H    sucralfate  1 g Oral QID (AC & HS)    vancomycin (VANCOCIN) IVPB  1,000 mg Intravenous Q12H       Current Intravenous Infusions        ROS negative unless documented in the history of present illness      Objective:       Intake/Output Summary (Last 24 hours) at 9/29/2022 1130  Last data filed at 9/29/2022 1000  Gross per 24 hour   Intake 3188 ml   Output 3760 ml   Net -572 ml         Vital Signs (Most Recent):  Temp: 97.7 °F (36.5 °C) (09/29/22 0800)  Pulse: 73 (09/29/22 1100)  Resp: (!) 22 (09/29/22 1100)  BP: 125/70 (09/29/22 1100)  SpO2: (!) 94 % (09/29/22 1100)    Body mass index is 26.28 kg/m².  Weight: 67.3 kg (148 lb 5.9 oz) Vital Signs (24h Range):  Temp:  [96.1 °F (35.6 °C)-99.3 °F (37.4 °C)] 97.7 °F (36.5 °C)  Pulse:  [] 73  Resp:  [10-33] 22  SpO2:  [86 %-100 %] 94 %  BP: ()/(50-96) 125/70  Arterial Line BP: ()/(40-68) 136/59       Physical Exam  Gen- awake, alert, conversant  HETN- ATNC, MMM  CV-  RRR, no murmurs  Resp- scattered bilateral crackles, saturations 90-91% on 12L oxymask   MSK- WWP, trace edema       Lines/Drains/Airways       Central Venous Catheter Line  Duration              Introducer with Double Lumen 09/28/22 0839 1 day    Introducer 09/28/22 0839 1 day    Percutaneous Central Line Insertion/Assessment - Triple Lumen  09/28/22 0740 1 day              Drain  Duration                  Chest Tube 09/28/22 0910 1 Right Mediastinal 19 Fr. 1 day         Chest Tube 09/28/22 0921 2 Left Mediastinal 24 Fr. 1 day         Urethral Catheter 09/28/22 0650 Latex 16 Fr. 1 day              Arterial Line  Duration             Arterial Line 09/28/22 0634 Right Radial 1 day              Peripheral Intravenous Line  Duration                  Peripheral IV - Single Lumen 09/28/22 0540 18 G Left Antecubital 1 day                    Significant Labs:    Lab Results   Component Value Date    WBC 11.9 (H) 09/29/2022    HGB 13.4 09/29/2022    HCT 40.0 09/29/2022    MCV 93.2 09/29/2022     (L) 09/29/2022         BMP  Lab Results   Component Value Date     09/29/2022    K 4.0 09/29/2022    CO2 22 (L) 09/29/2022    BUN 8.0 (L) 09/29/2022    CREATININE 0.62 09/29/2022    CALCIUM 7.5 (L) 09/29/2022       ABG  Recent Labs   Lab 09/29/22  0642   PH 7.35   PO2 59*   PCO2 37   HCO3 20.4*       Mechanical Ventilation Support:  Vent Mode: CPAP PSV-Volume (09/28/22 1643)  Set Rate: 8 BPM (09/28/22 1545)  Vt Set: 500 mL (09/28/22 1545)  Pressure Support: 10 cmH20 (09/28/22 1643)  PEEP/CPAP: 5 cmH20 (09/28/22 1643)  Oxygen Concentration (%): 35 (09/28/22 1643)  Peak Airway Pressure: 18 cmH2O (09/28/22 1414)  Total Ve: 9.3 mL (09/28/22 1643)  F/VT Ratio<105 (RSBI): (!) 45.95 (09/28/22 1643)    Significant Imaging:  I have reviewed the pertinent imaging within the past 24 hours.        Assessment/Plan:       Ms. Bacon is a 75yo female with history of severe aortic insufficiency and ascending aortic aneurysm, who is  currently postoperative day 1 from aortic valve replacement and ascending aortic aneurysm resection and repair.    Plan:   -some continued hypoxia in the postoperative period, with chest x-ray concerning for volume overload as well as some contribution from hypoventilation/shallow breathing  -chest x-ray this morning personally reviewed, with some vascular congestion, bibasilar atelectasis and possible trace pleural effusions  -encourage aggressive incentive spirometry, ambulatory and out of bed as tolerated  -diuresis today with 40 mg IV Lasix x1   -preoperative urine culture with pansensitive Klebsiella growth, no evidence that this was treated prior to surgery--> begin 3 day course of nitrofurantoin (penicillin allergy)  -maintain ICU level care pending improvement in respiratory status  -remainder of routine postoperative care per thoracic surgery recommendations           Ata Francis MD  Pulmonary Critical Care Medicine  [facility] - [address] [place] [address]

## 2022-09-30 LAB
ABO + RH BLD: NORMAL
ANION GAP SERPL CALC-SCNC: 7 MEQ/L
BASOPHILS # BLD AUTO: 0.02 X10(3)/MCL (ref 0–0.2)
BASOPHILS NFR BLD AUTO: 0.2 %
BLD PROD TYP BPU: NORMAL
BLOOD UNIT EXPIRATION DATE: NORMAL
BLOOD UNIT TYPE CODE: 5100
BUN SERPL-MCNC: 11.3 MG/DL (ref 9.8–20.1)
CALCIUM SERPL-MCNC: 8.2 MG/DL (ref 8.4–10.2)
CHLORIDE SERPL-SCNC: 108 MMOL/L (ref 98–107)
CO2 SERPL-SCNC: 19 MMOL/L (ref 23–31)
CREAT SERPL-MCNC: 0.74 MG/DL (ref 0.55–1.02)
CREAT/UREA NIT SERPL: 15
CROSSMATCH INTERPRETATION: NORMAL
DISPENSE STATUS: NORMAL
EOSINOPHIL # BLD AUTO: 0.01 X10(3)/MCL (ref 0–0.9)
EOSINOPHIL NFR BLD AUTO: 0.1 %
ERYTHROCYTE [DISTWIDTH] IN BLOOD BY AUTOMATED COUNT: 14.6 % (ref 11.5–17)
GFR SERPLBLD CREATININE-BSD FMLA CKD-EPI: >60 MLS/MIN/1.73/M2
GLUCOSE SERPL-MCNC: 104 MG/DL (ref 82–115)
HCT VFR BLD AUTO: 38.2 % (ref 37–47)
HGB BLD-MCNC: 12.7 GM/DL (ref 12–16)
IMM GRANULOCYTES # BLD AUTO: 0.09 X10(3)/MCL (ref 0–0.04)
IMM GRANULOCYTES NFR BLD AUTO: 0.7 %
LYMPHOCYTES # BLD AUTO: 1.02 X10(3)/MCL (ref 0.6–4.6)
LYMPHOCYTES NFR BLD AUTO: 7.9 %
MCH RBC QN AUTO: 31 PG (ref 27–31)
MCHC RBC AUTO-ENTMCNC: 33.2 MG/DL (ref 33–36)
MCV RBC AUTO: 93.2 FL (ref 80–94)
MONOCYTES # BLD AUTO: 1.09 X10(3)/MCL (ref 0.1–1.3)
MONOCYTES NFR BLD AUTO: 8.4 %
NEUTROPHILS # BLD AUTO: 10.7 X10(3)/MCL (ref 2.1–9.2)
NEUTROPHILS NFR BLD AUTO: 82.7 %
NRBC BLD AUTO-RTO: 0 %
PLATELET # BLD AUTO: 105 X10(3)/MCL (ref 130–400)
PMV BLD AUTO: 10.4 FL (ref 7.4–10.4)
POCT GLUCOSE: 78 MG/DL (ref 70–110)
POCT GLUCOSE: 82 MG/DL (ref 70–110)
POCT GLUCOSE: 85 MG/DL (ref 70–110)
POCT GLUCOSE: 87 MG/DL (ref 70–110)
POCT GLUCOSE: 91 MG/DL (ref 70–110)
POTASSIUM SERPL-SCNC: 3.8 MMOL/L (ref 3.5–5.1)
RBC # BLD AUTO: 4.1 X10(6)/MCL (ref 4.2–5.4)
SODIUM SERPL-SCNC: 134 MMOL/L (ref 136–145)
UNIT NUMBER: NORMAL
WBC # SPEC AUTO: 13 X10(3)/MCL (ref 4.5–11.5)

## 2022-09-30 PROCEDURE — 97162 PT EVAL MOD COMPLEX 30 MIN: CPT

## 2022-09-30 PROCEDURE — 20000000 HC ICU ROOM

## 2022-09-30 PROCEDURE — 99024 POSTOP FOLLOW-UP VISIT: CPT | Mod: POP,,,

## 2022-09-30 PROCEDURE — 94760 N-INVAS EAR/PLS OXIMETRY 1: CPT

## 2022-09-30 PROCEDURE — 27000221 HC OXYGEN, UP TO 24 HOURS

## 2022-09-30 PROCEDURE — 80048 BASIC METABOLIC PNL TOTAL CA: CPT

## 2022-09-30 PROCEDURE — 85025 COMPLETE CBC W/AUTO DIFF WBC: CPT

## 2022-09-30 PROCEDURE — 97110 THERAPEUTIC EXERCISES: CPT

## 2022-09-30 PROCEDURE — 25000003 PHARM REV CODE 250: Performed by: INTERNAL MEDICINE

## 2022-09-30 PROCEDURE — 63600175 PHARM REV CODE 636 W HCPCS

## 2022-09-30 PROCEDURE — 25000003 PHARM REV CODE 250

## 2022-09-30 PROCEDURE — 94799 UNLISTED PULMONARY SVC/PX: CPT

## 2022-09-30 PROCEDURE — 25000003 PHARM REV CODE 250: Performed by: SPECIALIST

## 2022-09-30 PROCEDURE — 99024 PR POST-OP FOLLOW-UP VISIT: ICD-10-PCS | Mod: POP,,,

## 2022-09-30 PROCEDURE — 36415 COLL VENOUS BLD VENIPUNCTURE: CPT

## 2022-09-30 RX ORDER — MUPIROCIN 20 MG/G
OINTMENT TOPICAL 2 TIMES DAILY
Status: CANCELLED | OUTPATIENT
Start: 2022-09-30 | End: 2022-10-05

## 2022-09-30 RX ORDER — NITROFURANTOIN MACROCRYSTALS 50 MG/1
100 CAPSULE ORAL EVERY 12 HOURS
Status: COMPLETED | OUTPATIENT
Start: 2022-09-30 | End: 2022-10-07

## 2022-09-30 RX ORDER — OXYCODONE HYDROCHLORIDE 5 MG/1
5 TABLET ORAL EVERY 4 HOURS PRN
Status: CANCELLED | OUTPATIENT
Start: 2022-09-30

## 2022-09-30 RX ORDER — ACETAMINOPHEN 325 MG/1
325 TABLET ORAL EVERY 4 HOURS PRN
Status: CANCELLED | OUTPATIENT
Start: 2022-09-30

## 2022-09-30 RX ORDER — ONDANSETRON 4 MG/1
8 TABLET, ORALLY DISINTEGRATING ORAL EVERY 8 HOURS PRN
Status: CANCELLED | OUTPATIENT
Start: 2022-09-30

## 2022-09-30 RX ORDER — OXYCODONE HYDROCHLORIDE 5 MG/1
10 TABLET ORAL EVERY 4 HOURS PRN
Status: CANCELLED | OUTPATIENT
Start: 2022-09-30

## 2022-09-30 RX ORDER — SODIUM CHLORIDE 0.9 % (FLUSH) 0.9 %
2 SYRINGE (ML) INJECTION
Status: DISCONTINUED | OUTPATIENT
Start: 2022-09-30 | End: 2022-10-12 | Stop reason: HOSPADM

## 2022-09-30 RX ADMIN — SUCRALFATE 1 G: 1 TABLET ORAL at 06:09

## 2022-09-30 RX ADMIN — FOLIC ACID 1 MG: 1 TABLET ORAL at 10:09

## 2022-09-30 RX ADMIN — SUCRALFATE 1 G: 1 TABLET ORAL at 04:09

## 2022-09-30 RX ADMIN — NITROFURANTOIN MACROCRYSTALS 100 MG: 50 CAPSULE ORAL at 10:09

## 2022-09-30 RX ADMIN — ENOXAPARIN SODIUM 40 MG: 40 INJECTION SUBCUTANEOUS at 04:09

## 2022-09-30 RX ADMIN — ONDANSETRON 4 MG: 2 INJECTION INTRAMUSCULAR; INTRAVENOUS at 09:09

## 2022-09-30 RX ADMIN — FAMOTIDINE 20 MG: 10 INJECTION INTRAVENOUS at 09:09

## 2022-09-30 RX ADMIN — DOCUSATE SODIUM 100 MG: 100 CAPSULE, LIQUID FILLED ORAL at 08:09

## 2022-09-30 RX ADMIN — DOCUSATE SODIUM 100 MG: 100 CAPSULE, LIQUID FILLED ORAL at 09:09

## 2022-09-30 RX ADMIN — POTASSIUM CHLORIDE 20 MEQ: 14.9 INJECTION, SOLUTION INTRAVENOUS at 02:09

## 2022-09-30 RX ADMIN — SUCRALFATE 1 G: 1 TABLET ORAL at 10:09

## 2022-09-30 RX ADMIN — METOPROLOL TARTRATE 12.5 MG: 25 TABLET, FILM COATED ORAL at 09:09

## 2022-09-30 RX ADMIN — MUPIROCIN: 20 OINTMENT TOPICAL at 09:09

## 2022-09-30 RX ADMIN — HYDROCODONE BITARTRATE AND ACETAMINOPHEN 1 TABLET: 5; 325 TABLET ORAL at 02:09

## 2022-09-30 RX ADMIN — SUCRALFATE 1 G: 1 TABLET ORAL at 09:09

## 2022-09-30 RX ADMIN — NITROFURANTOIN (MONOHYDRATE/MACROCRYSTALS) 100 MG: 75; 25 CAPSULE ORAL at 10:09

## 2022-09-30 NOTE — PROGRESS NOTES
09/30/22 0800   Pre Exercise Vitals   /83   Pulse 83   Supplemental O2? Yes   O2 Device nasal cannula   O2 Flow (L/min) 2   SpO2 92 %   During Exercise Vitals   Pulse 86   Supplemental O2? Yes   O2 Device nasal cannula   O2 Flow (L/min) 2   SpO2 90 %   Distance Walked 70   Post Exercise Vitals   /90   Pulse 83   Supplemental O2? Yes   O2 Device nasal cannula   O2 Flow (L/min) 2   SpO2 93 %   Modality   Modality Walker  (chair followed behind)   Encouraged incentive spirometer q 1 hour

## 2022-09-30 NOTE — CARE UPDATE
[id number] Spoke with PTCandi who reports pt would need rehab placement. Called pt's nephew, Rachid and spoke with him re rehab. He requested a referral be sent to [facility]. Referral sent to [facility] thru [facility].

## 2022-09-30 NOTE — PT/OT/SLP EVAL
Physical Therapy Evaluation    Patient Name:  Sydney Bacon   MRN:  [id number]    Recommendations:     Discharge Recommendations:  rehabilitation facility   Discharge Equipment Recommendations: rollator, bath bench   Barriers to discharge: Decreased caregiver support    Assessment:     Sydney Bacon is a 76 y.o. female admitted with a medical diagnosis of s/p resection sortic aneurysm, AVR, BOB. .  She presents with the following impairments/functional limitations:  weakness, impaired endurance, impaired self care skills, impaired functional mobility, gait instability, impaired balance. Pt lives alone and is normally independent with all mobility and ADLs. Recommending rehab at d/c to regain PLOF.    Rehab Prognosis: Good; patient would benefit from acute skilled PT services to address these deficits and reach maximum level of function.    Recent Surgery: Procedure(s) (LRB):  REPLACEMENT, AORTIC VALVE (N/A)  REPAIR, AORTA, ASCENDING (N/A) 2 Days Post-Op    Plan:     During this hospitalization, patient to be seen 5 x/week to address the identified rehab impairments via gait training, therapeutic activities, therapeutic exercises and progress toward the following goals:    Plan of Care Expires:  10/30/22    Subjective     Chief Complaint: none  Patient/Family Comments/goals: to get stronger  Pain/Comfort:  Pain Rating 1: 0/10    Patients cultural, spiritual, Evangelical conflicts given the current situation: no    Living Environment:  One story apt, no steps, lives alone.   Prior to admission, patients level of function was independent, driving, etc.  Equipment used at home: none.  DME owned (not currently used): none.  Upon discharge, patient will have assistance from has niece and nephew that can help.    Objective:     Communicated with nurse prior to session.  Patient found up in chair with chest tube, cottrell catheter, telemetry, pulse ox (continuous), blood pressure cuff  upon PT entry to room.    General  Precautions: Standard, sternal   Orthopedic Precautions:    Braces:    Respiratory Status: Room air  81 HR, 92% SpO2, 143/79 at rest    90 HR, 154/91, 90% SpO2 after ambulation  Exams:  Cognitive Exam:  Patient is oriented to Person, Place, Time, and Situation  RLE ROM: WNL  RLE Strength: Deficits: 4/5 grossly   LLE ROM: Deficits: WNL  LLE Strength: Deficits: 4/5 grossly    Functional Mobility:  Bed Mobility:     Sit to Supine: moderate assistance  Transfers:     Sit to Stand:  minimum assistance with rolling walker  Gait: 60ft with RW and CGA, slow pace.     Therapeutic Activities and Exercises:   Encouraged IS.     AM-PAC 6 CLICK MOBILITY  Total Score:16     Patient left supine with all lines intact and call button in reach.    GOALS:   Multidisciplinary Problems       Physical Therapy Goals          Problem: Physical Therapy    Goal Priority Disciplines Outcome Goal Variances Interventions   Physical Therapy Goal     PT, PT/OT Ongoing, Progressing     Description: Goals to be met by: 10/30/2022     Patient will increase functional independence with mobility by performin. Supine to sit with Stand-by Assistance  2. Sit to stand transfer with Stand-by Assistance  3. Gait  x 200 feet with Stand-by Assistance using Rolling Walker.                          History:     Past Medical History:   Diagnosis Date    Hyperlipidemia     Hypertension     Hypothyroidism     Stenosis of aortic and mitral valves     Valvular regurgitation        Past Surgical History:   Procedure Laterality Date    CATARACT EXTRACTION Bilateral     COLONOSCOPY      LEFT HEART CATHETERIZATION         Time Tracking:     PT Received On: 22  PT Start Time: 1443     PT Stop Time: 1500  PT Total Time (min): 17 min     Billable Minutes: Evaluation 17      2022

## 2022-09-30 NOTE — PROGRESS NOTES
Pulmonary & Critical Care Medicine   Progress Note      Presenting History/HPI:  HPI:   77 yo female with PMHx aortic and mitral insuffiency, HTN, nonischemic cardiomyopathy with EF 45% (Echo 7/12/22) admitted for cardiothoracic surgery following CT scan with dilated aortic aneurysm measuring 4.6-4.8cm. Had AVR with ascending aorta resection and repair by Dr. Edmonds 9/28/22.      Interval History:  No acute events overnight, on 2 L nasal cannula      Scheduled Medications:    docusate sodium  100 mg Oral BID    enoxaparin  40 mg Subcutaneous Daily    famotidine (PF)  20 mg Intravenous Daily    folic acid  1 mg Oral Daily    insulin aspart U-100  0-5 Units Subcutaneous Q4H    loperamide  4 mg Oral Once    metoprolol tartrate  12.5 mg Oral BID    nitrofurantoin (macrocrystal-monohydrate)  100 mg Oral Q12H    sucralfate  1 g Oral QID (AC & HS)       PRN Medications:   sodium chloride, albumin human 5%, calcium gluconate IVPB, calcium gluconate IVPB, calcium gluconate IVPB, dextrose 10%, dextrose 10%, glucagon (human recombinant), glucose, glucose, HYDROcodone-acetaminophen, lactulose 10 gram/15 ml, magnesium sulfate IVPB, magnesium sulfate IVPB, metoclopramide HCl, morphine, ondansetron, potassium chloride in water, potassium chloride in water, sodium chloride 0.9%, sodium chloride 0.9%, sodium phosphate IVPB, sodium phosphate IVPB, sodium phosphate IVPB      Infusions:          Fluid Balance:     Intake/Output Summary (Last 24 hours) at 9/30/2022 1231  Last data filed at 9/30/2022 1000  Gross per 24 hour   Intake 331 ml   Output 895 ml   Net -564 ml           Vital Signs:   Vitals:    09/30/22 1000   BP: (!) 147/75   Pulse: 79   Resp: (!) 21   Temp:          Physical Exam    Gen- awake, alert, conversant  HETN- ATNC, MMM  CV- RRR, no murmurs  Resp- scattered bilateral crackles, saturations 90-91% on 12L oxymask   MSK- WWP, trace edema       Laboratory Studies:   No results for input(s): PH, PCO2, PO2, HCO3,  POCSATURATED, BE in the last 24 hours.  Recent Labs   Lab 09/30/22  0116   WBC 13.0*   RBC 4.10*   HGB 12.7   HCT 38.2   *   MCV 93.2   MCH 31.0   MCHC 33.2     Recent Labs   Lab 09/30/22  0116   GLUCOSE 104   *   K 3.8   CO2 19*   BUN 11.3   CREATININE 0.74         Microbiology Data:   Microbiology Results (last 7 days)       ** No results found for the last 168 hours. **              Imaging:   X-Ray Chest AP Portable  Narrative: EXAMINATION:  XR CHEST AP PORTABLE    CLINICAL HISTORY:  Post-op;    TECHNIQUE:  Frontal view(s) of the chest.    COMPARISON:  Radiography 09/28/2022    FINDINGS:  Endotracheal and enteric tubes no longer seen.  Stable positioning of the right IJ sheath and thoracic drainage tubes.  Lungs remain hypoinflated with mild bibasilar atelectasis.  Possible small pleural effusions.  No pneumothorax identified.  Stable cardiac silhouette.  Impression: Mild bibasilar atelectasis with possible small pleural effusions.    Electronically signed by: Ivan Chan  Date:    09/29/2022  Time:    07:20          Assessment and Plan    Assessment:  Severe aortic insufficiency  Ascending aortic aneurysm  Status post aortic valve replacement and ascending aortic aneurysm resection and repair          Plan:  -lung reexpansion therapies  -2 L nasal cannula  -Klebsiella UTI on Macrobid for 3 days  -routine postop care per thoracic surgery recommendations  -okay to step-down from ICU.        Kelsie Hairston MD  9/30/2022  Pulmonology/Critical Care

## 2022-09-30 NOTE — PROGRESS NOTES
CT SURGERY PROGRESS NOTE  Sydney Bacon  76 y.o.  1945    Patients Procedure: Procedure(s) (LRB):  REPLACEMENT, AORTIC VALVE (N/A)  REPAIR, AORTA, ASCENDING (N/A)    Subjective  Interval History: NAEO. Patient sitting OOB continues to improve, weaned down to 2L NC. Up walking today. Chest tubes still draining. Okay to transfer.    Review of Systems   Constitutional: Negative.    Respiratory:  Negative for cough, sputum production and shortness of breath.    Cardiovascular:  Negative for chest pain, palpitations, claudication and leg swelling.   Gastrointestinal:  Negative for abdominal pain, nausea and vomiting.   Genitourinary: Negative.    Skin: Negative.         Incision C/D/I     Medication List  Infusions    Scheduled   docusate sodium  100 mg Oral BID    enoxaparin  40 mg Subcutaneous Daily    famotidine (PF)  20 mg Intravenous Daily    folic acid  1 mg Oral Daily    insulin aspart U-100  0-5 Units Subcutaneous Q4H    loperamide  4 mg Oral Once    metoprolol tartrate  12.5 mg Oral BID    mupirocin   Nasal BID    nitrofurantoin (macrocrystal-monohydrate)  100 mg Oral Q12H    sucralfate  1 g Oral QID (AC & HS)       Objective:  Recent Vitals:  Temp:  [98.1 °F (36.7 °C)-98.6 °F (37 °C)] 98.6 °F (37 °C)  Pulse:  [71-89] 79  Resp:  [10-28] 21  SpO2:  [87 %-98 %] 92 %  BP: ()/(56-83) 147/75  Arterial Line BP: (136)/(59) 136/59    Physical Exam  Vitals and nursing note reviewed.   Constitutional:       General: She is awake. She is not in acute distress.     Appearance: Normal appearance. She is not toxic-appearing or diaphoretic.   HENT:      Head: Normocephalic and atraumatic.   Eyes:      Extraocular Movements: Extraocular movements intact.      Pupils: Pupils are equal, round, and reactive to light.   Cardiovascular:      Rate and Rhythm: Normal rate and regular rhythm.      Pulses: Normal pulses.           Radial pulses are 2+ on the right side and 2+ on the left side.        Dorsalis pedis pulses  are 2+ on the right side and 2+ on the left side.      Heart sounds: Normal heart sounds.   Pulmonary:      Effort: Pulmonary effort is normal.      Breath sounds: Normal breath sounds.   Musculoskeletal:      Right lower leg: No edema.      Left lower leg: No edema.   Skin:     General: Skin is warm and dry.      Comments: INCISION C/D/I   Neurological:      General: No focal deficit present.      Mental Status: She is alert and oriented to person, place, and time.   Psychiatric:         Mood and Affect: Mood and affect normal.        I/O last 24 hrs:  Intake/Output - Last 3 Shifts         09/28 0700 09/29 0659 09/29 0700 09/30 0659 09/30 0700  10/01 0659    P.O.  200 60    I.V. (mL/kg) 3348 (49.7) 265 (3.9)     IV Piggyback 90 6     Total Intake(mL/kg) 3438 (51.1) 471 (7) 60 (0.9)    Urine (mL/kg/hr) 3505 (2.2) 1240 (0.8) 300 (1.2)    Drains 1034      Chest Tube 420 260     Total Output 4959 1500 300    Net -1521 -1029 -240                   Labs  BMP:   Recent Labs   Lab 09/30/22  0116   *   K 3.8   CO2 19*   BUN 11.3   CREATININE 0.74   CALCIUM 8.2*       CBC:   Recent Labs   Lab 09/30/22  0116   WBC 13.0*   RBC 4.10*   HGB 12.7   HCT 38.2   *   MCV 93.2   MCH 31.0   MCHC 33.2       All pertinent labs from the last 24 hours have been reviewed.      Imaging:   CXR: X-Ray Chest AP Portable    Result Date: 9/29/2022  Mild bibasilar atelectasis with possible small pleural effusions. Electronically signed by: Ivan Chan Date:    09/29/2022 Time:    07:20   I have reviewed all pertinent imaging results/findings within the past 24 hours.        ASSESSMENT/PLAN:  - Transfer to telemetry  - ABX for UTI  - IS/OOB/Ambulate  - wean O2 as able.   - PT/OT    Case and plan of care discussed with MD Eliseo Lentz PA-C

## 2022-09-30 NOTE — PLAN OF CARE
Problem: Physical Therapy  Goal: Physical Therapy Goal  Description: Goals to be met by: 10/30/2022     Patient will increase functional independence with mobility by performin. Supine to sit with Stand-by Assistance  2. Sit to stand transfer with Stand-by Assistance  3. Gait  x 200 feet with Stand-by Assistance using Rolling Walker.     Outcome: Ongoing, Progressing

## 2022-10-01 LAB
ANION GAP SERPL CALC-SCNC: 9 MEQ/L
BASOPHILS # BLD AUTO: 0.03 X10(3)/MCL (ref 0–0.2)
BASOPHILS NFR BLD AUTO: 0.3 %
BUN SERPL-MCNC: 10.5 MG/DL (ref 9.8–20.1)
CALCIUM SERPL-MCNC: 8.2 MG/DL (ref 8.4–10.2)
CHLORIDE SERPL-SCNC: 111 MMOL/L (ref 98–107)
CO2 SERPL-SCNC: 20 MMOL/L (ref 23–31)
CREAT SERPL-MCNC: 0.65 MG/DL (ref 0.55–1.02)
CREAT/UREA NIT SERPL: 16
EOSINOPHIL # BLD AUTO: 0.03 X10(3)/MCL (ref 0–0.9)
EOSINOPHIL NFR BLD AUTO: 0.3 %
ERYTHROCYTE [DISTWIDTH] IN BLOOD BY AUTOMATED COUNT: 13.9 % (ref 11.5–17)
ESTROGEN SERPL-MCNC: NORMAL PG/ML
GFR SERPLBLD CREATININE-BSD FMLA CKD-EPI: >60 MLS/MIN/1.73/M2
GLUCOSE SERPL-MCNC: 90 MG/DL (ref 82–115)
HCT VFR BLD AUTO: 41.2 % (ref 37–47)
HGB BLD-MCNC: 13.6 GM/DL (ref 12–16)
IMM GRANULOCYTES # BLD AUTO: 0.04 X10(3)/MCL (ref 0–0.04)
IMM GRANULOCYTES NFR BLD AUTO: 0.4 %
INSULIN SERPL-ACNC: NORMAL U[IU]/ML
LAB AP CLINICAL INFORMATION: NORMAL
LAB AP GROSS DESCRIPTION: NORMAL
LAB AP REPORT FOOTNOTES: NORMAL
LYMPHOCYTES # BLD AUTO: 0.86 X10(3)/MCL (ref 0.6–4.6)
LYMPHOCYTES NFR BLD AUTO: 7.9 %
MCH RBC QN AUTO: 30.6 PG (ref 27–31)
MCHC RBC AUTO-ENTMCNC: 33 MG/DL (ref 33–36)
MCV RBC AUTO: 92.6 FL (ref 80–94)
MONOCYTES # BLD AUTO: 1.05 X10(3)/MCL (ref 0.1–1.3)
MONOCYTES NFR BLD AUTO: 9.7 %
NEUTROPHILS # BLD AUTO: 8.9 X10(3)/MCL (ref 2.1–9.2)
NEUTROPHILS NFR BLD AUTO: 81.4 %
NRBC BLD AUTO-RTO: 0 %
PLATELET # BLD AUTO: 122 X10(3)/MCL (ref 130–400)
PMV BLD AUTO: 10.1 FL (ref 7.4–10.4)
POCT GLUCOSE: 107 MG/DL (ref 70–110)
POCT GLUCOSE: 110 MG/DL (ref 70–110)
POCT GLUCOSE: 111 MG/DL (ref 70–110)
POCT GLUCOSE: 83 MG/DL (ref 70–110)
POTASSIUM SERPL-SCNC: 3.8 MMOL/L (ref 3.5–5.1)
RBC # BLD AUTO: 4.45 X10(6)/MCL (ref 4.2–5.4)
SODIUM SERPL-SCNC: 140 MMOL/L (ref 136–145)
T3RU NFR SERPL: NORMAL %
WBC # SPEC AUTO: 10.9 X10(3)/MCL (ref 4.5–11.5)

## 2022-10-01 PROCEDURE — 99024 POSTOP FOLLOW-UP VISIT: CPT | Mod: POP,,, | Performed by: PHYSICIAN ASSISTANT

## 2022-10-01 PROCEDURE — 80048 BASIC METABOLIC PNL TOTAL CA: CPT

## 2022-10-01 PROCEDURE — 99024 PR POST-OP FOLLOW-UP VISIT: ICD-10-PCS | Mod: POP,,, | Performed by: PHYSICIAN ASSISTANT

## 2022-10-01 PROCEDURE — 27000221 HC OXYGEN, UP TO 24 HOURS

## 2022-10-01 PROCEDURE — 36415 COLL VENOUS BLD VENIPUNCTURE: CPT

## 2022-10-01 PROCEDURE — 25000003 PHARM REV CODE 250

## 2022-10-01 PROCEDURE — 93005 ELECTROCARDIOGRAM TRACING: CPT

## 2022-10-01 PROCEDURE — 63600175 PHARM REV CODE 636 W HCPCS: Performed by: NURSE PRACTITIONER

## 2022-10-01 PROCEDURE — 93010 EKG 12-LEAD: ICD-10-PCS | Mod: ,,, | Performed by: INTERNAL MEDICINE

## 2022-10-01 PROCEDURE — 97166 OT EVAL MOD COMPLEX 45 MIN: CPT

## 2022-10-01 PROCEDURE — 25000003 PHARM REV CODE 250: Performed by: SPECIALIST

## 2022-10-01 PROCEDURE — 20000000 HC ICU ROOM

## 2022-10-01 PROCEDURE — 93010 ELECTROCARDIOGRAM REPORT: CPT | Mod: ,,, | Performed by: INTERNAL MEDICINE

## 2022-10-01 PROCEDURE — 63600175 PHARM REV CODE 636 W HCPCS

## 2022-10-01 PROCEDURE — 85025 COMPLETE CBC W/AUTO DIFF WBC: CPT

## 2022-10-01 RX ORDER — DILTIAZEM HYDROCHLORIDE 5 MG/ML
INJECTION INTRAVENOUS
Status: DISPENSED
Start: 2022-10-01 | End: 2022-10-02

## 2022-10-01 RX ORDER — DIGOXIN 0.25 MG/ML
INJECTION INTRAMUSCULAR; INTRAVENOUS
Status: DISCONTINUED
Start: 2022-10-01 | End: 2022-10-01 | Stop reason: WASHOUT

## 2022-10-01 RX ORDER — METOPROLOL TARTRATE 1 MG/ML
INJECTION, SOLUTION INTRAVENOUS
Status: DISCONTINUED
Start: 2022-10-01 | End: 2022-10-01 | Stop reason: WASHOUT

## 2022-10-01 RX ADMIN — ENOXAPARIN SODIUM 40 MG: 40 INJECTION SUBCUTANEOUS at 04:10

## 2022-10-01 RX ADMIN — SUCRALFATE 1 G: 1 TABLET ORAL at 04:10

## 2022-10-01 RX ADMIN — FOLIC ACID 1 MG: 1 TABLET ORAL at 08:10

## 2022-10-01 RX ADMIN — SUCRALFATE 1 G: 1 TABLET ORAL at 05:10

## 2022-10-01 RX ADMIN — SUCRALFATE 1 G: 1 TABLET ORAL at 11:10

## 2022-10-01 RX ADMIN — HYDROCODONE BITARTRATE AND ACETAMINOPHEN 1 TABLET: 5; 325 TABLET ORAL at 08:10

## 2022-10-01 RX ADMIN — ONDANSETRON 4 MG: 2 INJECTION INTRAMUSCULAR; INTRAVENOUS at 12:10

## 2022-10-01 RX ADMIN — DOCUSATE SODIUM 100 MG: 100 CAPSULE, LIQUID FILLED ORAL at 08:10

## 2022-10-01 RX ADMIN — METOPROLOL TARTRATE 12.5 MG: 25 TABLET, FILM COATED ORAL at 08:10

## 2022-10-01 RX ADMIN — ONDANSETRON 4 MG: 2 INJECTION INTRAMUSCULAR; INTRAVENOUS at 06:10

## 2022-10-01 RX ADMIN — AMIODARONE HYDROCHLORIDE 150 MG: 1.5 INJECTION, SOLUTION INTRAVENOUS at 03:10

## 2022-10-01 RX ADMIN — NITROFURANTOIN MACROCRYSTALS 100 MG: 50 CAPSULE ORAL at 08:10

## 2022-10-01 RX ADMIN — AMIODARONE HYDROCHLORIDE 1 MG/MIN: 1.8 INJECTION, SOLUTION INTRAVENOUS at 03:10

## 2022-10-01 RX ADMIN — FAMOTIDINE 20 MG: 10 INJECTION INTRAVENOUS at 08:10

## 2022-10-01 RX ADMIN — AMIODARONE HYDROCHLORIDE 0.5 MG/MIN: 1.8 INJECTION, SOLUTION INTRAVENOUS at 09:10

## 2022-10-01 RX ADMIN — SUCRALFATE 1 G: 1 TABLET ORAL at 08:10

## 2022-10-01 RX ADMIN — HYDROCODONE BITARTRATE AND ACETAMINOPHEN 1 TABLET: 5; 325 TABLET ORAL at 12:10

## 2022-10-01 NOTE — PLAN OF CARE
Problem: Occupational Therapy  Goal: Occupational Therapy Goal  Description:   1. Pt will ambulate with RW to bathroom and complete toilet t/f mod I.  2. Pt will complete UE dressing with sternal pxns mod I.  3. Pt will complete LE dressing with sternal pxns mod I.  4. Pt will complete toileting tasks mod I sternal pxns.  Outcome: Ongoing, Progressing

## 2022-10-01 NOTE — PT/OT/SLP EVAL
Occupational Therapy   Evaluation    Name: Sydney Bacon  MRN: [id number]  Admit due to : scheduled surgery  Admitting Diagnosis:  aortic and mitral insufficiency, dilated aortic aneurysm s/p AVR with ascending aorta resection and repair 9/28, non ischemic cardiomyopathy EF 45%  Recent Surgery: Procedure(s) (LRB):  REPLACEMENT, AORTIC VALVE (N/A)  REPAIR, AORTA, ASCENDING (N/A) 3 Days Post-Op    Recommendations:     Discharge Recommendations: rehabilitation facility  Discharge Equipment Recommendations:     Barriers to discharge:       Assessment:     Sydney Bacon is a 76 y.o. female with a medical diagnosis of aortic and mitral insufficiency, dilated aortic aneurysm s/p AVR with ascending aorta resection and repair 9/28, non ischemic cardiomyopathy EF 45%.  She presents with decreased activity tolerance, decreased balance, generalized weakness, sternal precautions. Performance deficits affecting function: weakness, impaired endurance, impaired self care skills, impaired functional mobility.      Rehab Prognosis: Good; patient would benefit from acute skilled OT services to address these deficits and reach maximum level of function.       Plan:     Patient to be seen daily to address the above listed problems via self-care/home management, therapeutic activities, therapeutic exercises  Plan of Care Expires:    Plan of Care Reviewed with: patient    Subjective     Chief Complaint: 1-2/10 chest pain  Patient/Family Comments/goals: to return home to [facility]    Occupational Profile:  Living Environment: resides home alone, 1 story apartment, no steps , tub/shower combo  Previous level of function: I with ADL tasks and mobility, driving  Roles and Routines:   Equipment Used at Home:     Assistance upon Discharge: resides alone    Pain/Comfort:  Pain Rating 1: 2/10  Location 1: chest    Patients cultural, spiritual, L[employer] conflicts given the current situation:      Objective:     Communicated with: rn prior to  session.  Patient found up in chair with telemetry, pulse ox (continuous), chest tube, blood pressure cuff, oxygen upon OT entry to room.    General Precautions: Standard, sternal   Orthopedic Precautions:    Braces:    Respiratory Status: Nasal cannula, flow 1 L/min  HR 92, O2 sat 92-93%, /73    Occupational Performance:    Bed Mobility:    NT due to pt sitting uic upon OT arrival.    Functional Mobility/Transfers:  Patient completed Sit <> Stand Transfer with contact guard assistance  with  rolling walker   Patient completed Toilet Transfer Step Transfer technique with contact guard assistance with  rolling walker  Functional Mobility: pt ambulated 140ft with RW SBA/CGA    Activities of Daily Living:  Feeding:  modified independence .  Grooming: stand by assistance seated  Upper Body Dressing: moderate assistance donning front open gown, sternal pxns  Lower Body Dressing: stand by assistance donned B socks figure 4 SBA, able to thread B LE into pillowcase SBA   Toileting: total assistance cottrell cath    Cognitive/Visual Perceptual:  Cognitive/Psychosocial Skills:     -       Oriented to: Person, Place, Time, and Situation   -       Follows Commands/attention:Follows multistep  commands  -       Safety awareness/insight to disability: intact   -       Mood/Affect/Coping skills/emotional control: Cooperative and Pleasant    Physical Exam:  Upper Extremity Range of Motion:     -       Right Upper Extremity: WFL  -       Left Upper Extremity: WFL  Upper Extremity Strength:    -       Right Upper Extremity: WFL  -       Left Upper Extremity: WFL  Ue's following sternal pxns    AMPAC 6 Click ADL:  AMPAC Total Score:      Treatment & Education:  Education on OT POC, education on dressing and toileting with sternal pxns    Patient left up in chair with call button in reach    GOALS:   Multidisciplinary Problems       Occupational Therapy Goals          Problem: Occupational Therapy    Goal Priority Disciplines  Outcome Interventions   Occupational Therapy Goal     OT, PT/OT Ongoing, Progressing    Description: 1. Pt will ambulate with RW to bathroom and complete toilet t/f mod I.  2. Pt will complete UE dressing with sternal pxns mod I.  3. Pt will complete LE dressing with sternal pxns mod I.  4. Pt will complete toileting tasks mod I sternal pxns.                       History:     Past Medical History:   Diagnosis Date    Hyperlipidemia     Hypertension     Hypothyroidism     Stenosis of aortic and mitral valves     Valvular regurgitation          Past Surgical History:   Procedure Laterality Date    CATARACT EXTRACTION Bilateral     COLONOSCOPY      LEFT HEART CATHETERIZATION         Time Tracking:     OT Date of Treatment: 10/01/22  OT Start Time: 1023  OT Stop Time: 1057  OT Total Time (min): 34 min    Billable Minutes:Evaluation mod comp    10/1/2022

## 2022-10-01 NOTE — PLAN OF CARE
Problem: Adult Inpatient Plan of Care  Goal: Plan of Care Review  Outcome: Ongoing, Progressing  Goal: Patient-Specific Goal (Individualized)  Outcome: Ongoing, Progressing  Goal: Absence of Hospital-Acquired Illness or Injury  Outcome: Ongoing, Progressing  Goal: Optimal Comfort and Wellbeing  Outcome: Ongoing, Progressing  Goal: Readiness for Transition of Care  Outcome: Ongoing, Progressing     Problem: Infection  Goal: Absence of Infection Signs and Symptoms  Outcome: Ongoing, Progressing     Problem: Communication Impairment (Mechanical Ventilation, Invasive)  Goal: Effective Communication  Outcome: Ongoing, Progressing     Problem: Device-Related Complication Risk (Mechanical Ventilation, Invasive)  Goal: Optimal Device Function  Outcome: Ongoing, Progressing     Problem: Inability to Wean (Mechanical Ventilation, Invasive)  Goal: Mechanical Ventilation Liberation  Outcome: Ongoing, Progressing     Problem: Nutrition Impairment (Mechanical Ventilation, Invasive)  Goal: Optimal Nutrition Delivery  Outcome: Ongoing, Progressing     Problem: Skin and Tissue Injury (Mechanical Ventilation, Invasive)  Goal: Absence of Device-Related Skin and Tissue Injury  Outcome: Ongoing, Progressing     Problem: Ventilator-Induced Lung Injury (Mechanical Ventilation, Invasive)  Goal: Absence of Ventilator-Induced Lung Injury  Outcome: Ongoing, Progressing     Problem: Communication Impairment (Artificial Airway)  Goal: Effective Communication  Outcome: Ongoing, Progressing     Problem: Device-Related Complication Risk (Artificial Airway)  Goal: Optimal Device Function  Outcome: Ongoing, Progressing     Problem: Skin and Tissue Injury (Artificial Airway)  Goal: Absence of Device-Related Skin or Tissue Injury  Outcome: Ongoing, Progressing     Problem: Noninvasive Ventilation Acute  Goal: Effective Unassisted Ventilation and Oxygenation  Outcome: Ongoing, Progressing     Problem: Fall Injury Risk  Goal: Absence of Fall and  Fall-Related Injury  Outcome: Ongoing, Progressing     Problem: Skin Injury Risk Increased  Goal: Skin Health and Integrity  Outcome: Ongoing, Progressing

## 2022-10-01 NOTE — PROGRESS NOTES
"Sydeny Bacon is a 76 y.o. female patient.   1. Nonrheumatic aortic valve stenosis    2. Admission for long-term (current) use of anticoagulants    3. Ascending aortic aneurysm    4. Aortic valve disease    5. CAD (coronary artery disease)      Past Medical History:   Diagnosis Date    Hyperlipidemia     Hypertension     Hypothyroidism     Stenosis of aortic and mitral valves     Valvular regurgitation      No past surgical history pertinent negatives on file.  Scheduled Meds:   docusate sodium  100 mg Oral BID    enoxaparin  40 mg Subcutaneous Daily    famotidine (PF)  20 mg Intravenous Daily    folic acid  1 mg Oral Daily    insulin aspart U-100  0-5 Units Subcutaneous Q4H    loperamide  4 mg Oral Once    metoprolol tartrate  12.5 mg Oral BID    nitrofurantoin  100 mg Oral Q12H    sucralfate  1 g Oral QID (AC & HS)     Continuous Infusions:  PRN Meds:sodium chloride, albumin human 5%, calcium gluconate IVPB, calcium gluconate IVPB, calcium gluconate IVPB, dextrose 10%, dextrose 10%, glucagon (human recombinant), glucose, glucose, HYDROcodone-acetaminophen, lactulose 10 gram/15 ml, magnesium sulfate IVPB, magnesium sulfate IVPB, metoclopramide HCl, morphine, ondansetron, potassium chloride in water, potassium chloride in water, sodium chloride 0.9%, sodium chloride 0.9%, sodium phosphate IVPB, sodium phosphate IVPB, sodium phosphate IVPB    Review of patient's allergies indicates:   Allergen Reactions    Ace inhibitors     Aspirin     Atorvastatin     Penicillins      There are no hospital problems to display for this patient.    Blood pressure (!) 147/86, pulse 83, temperature 98.6 °F (37 °C), temperature source Oral, resp. rate (!) 25, height 5' 3" (1.6 m), weight 67.3 kg (148 lb 5.9 oz), SpO2 (!) 93 %, not currently breastfeeding.    SUBJECTIVE:  POD #3  Awake. Alert. Sitting up in chair    OBJECTIVE:  AFVSS 94% on 2L NC  Heart: RRR  Lungs: respirations nonlabored, clear  Incision: c/d/I  CT: 180cc/24hrs  Cxr: " stable  H/H: 13/41  Cr: 0.65    A/P:  S/P AVR/AAA Repair 9/28  - d/c CT  - d/c cottrell  - ok to floor  - ambulate  - IS      ROSANGELA Cadet  10/1/2022

## 2022-10-02 LAB — POCT GLUCOSE: 96 MG/DL (ref 70–110)

## 2022-10-02 PROCEDURE — 25000003 PHARM REV CODE 250: Performed by: PHYSICIAN ASSISTANT

## 2022-10-02 PROCEDURE — 63600175 PHARM REV CODE 636 W HCPCS: Performed by: NURSE PRACTITIONER

## 2022-10-02 PROCEDURE — 99024 POSTOP FOLLOW-UP VISIT: CPT | Mod: POP,,, | Performed by: PHYSICIAN ASSISTANT

## 2022-10-02 PROCEDURE — 27000221 HC OXYGEN, UP TO 24 HOURS

## 2022-10-02 PROCEDURE — 20000000 HC ICU ROOM

## 2022-10-02 PROCEDURE — 99024 PR POST-OP FOLLOW-UP VISIT: ICD-10-PCS | Mod: POP,,, | Performed by: PHYSICIAN ASSISTANT

## 2022-10-02 PROCEDURE — 25000003 PHARM REV CODE 250: Performed by: SPECIALIST

## 2022-10-02 PROCEDURE — 63600175 PHARM REV CODE 636 W HCPCS: Performed by: PHYSICIAN ASSISTANT

## 2022-10-02 PROCEDURE — 63600175 PHARM REV CODE 636 W HCPCS

## 2022-10-02 PROCEDURE — 25000003 PHARM REV CODE 250

## 2022-10-02 RX ORDER — POTASSIUM CHLORIDE 20 MEQ/1
20 TABLET, EXTENDED RELEASE ORAL ONCE
Status: COMPLETED | OUTPATIENT
Start: 2022-10-02 | End: 2022-10-02

## 2022-10-02 RX ORDER — FUROSEMIDE 10 MG/ML
40 INJECTION INTRAMUSCULAR; INTRAVENOUS ONCE
Status: COMPLETED | OUTPATIENT
Start: 2022-10-02 | End: 2022-10-02

## 2022-10-02 RX ADMIN — DOCUSATE SODIUM 100 MG: 100 CAPSULE, LIQUID FILLED ORAL at 08:10

## 2022-10-02 RX ADMIN — POTASSIUM CHLORIDE 20 MEQ: 1500 TABLET, EXTENDED RELEASE ORAL at 12:10

## 2022-10-02 RX ADMIN — ENOXAPARIN SODIUM 40 MG: 40 INJECTION SUBCUTANEOUS at 05:10

## 2022-10-02 RX ADMIN — FUROSEMIDE 40 MG: 10 INJECTION, SOLUTION INTRAMUSCULAR; INTRAVENOUS at 12:10

## 2022-10-02 RX ADMIN — NITROFURANTOIN MACROCRYSTALS 100 MG: 50 CAPSULE ORAL at 08:10

## 2022-10-02 RX ADMIN — FAMOTIDINE 20 MG: 10 INJECTION INTRAVENOUS at 08:10

## 2022-10-02 RX ADMIN — FOLIC ACID 1 MG: 1 TABLET ORAL at 08:10

## 2022-10-02 RX ADMIN — AMIODARONE HYDROCHLORIDE 0.5 MG/MIN: 1.8 INJECTION, SOLUTION INTRAVENOUS at 11:10

## 2022-10-02 RX ADMIN — METOPROLOL TARTRATE 12.5 MG: 25 TABLET, FILM COATED ORAL at 08:10

## 2022-10-02 RX ADMIN — SUCRALFATE 1 G: 1 TABLET ORAL at 11:10

## 2022-10-02 RX ADMIN — SUCRALFATE 1 G: 1 TABLET ORAL at 08:10

## 2022-10-02 RX ADMIN — HYDROCODONE BITARTRATE AND ACETAMINOPHEN 1 TABLET: 5; 325 TABLET ORAL at 09:10

## 2022-10-02 RX ADMIN — SUCRALFATE 1 G: 1 TABLET ORAL at 05:10

## 2022-10-02 RX ADMIN — HYDROCODONE BITARTRATE AND ACETAMINOPHEN 1 TABLET: 5; 325 TABLET ORAL at 03:10

## 2022-10-02 NOTE — PLAN OF CARE
Problem: Adult Inpatient Plan of Care  Goal: Plan of Care Review  Outcome: Ongoing, Progressing  Goal: Patient-Specific Goal (Individualized)  Outcome: Ongoing, Progressing  Goal: Absence of Hospital-Acquired Illness or Injury  Outcome: Ongoing, Progressing  Goal: Optimal Comfort and Wellbeing  Outcome: Ongoing, Progressing  Goal: Readiness for Transition of Care  Outcome: Ongoing, Progressing     Problem: Infection  Goal: Absence of Infection Signs and Symptoms  Outcome: Ongoing, Progressing     Problem: Communication Impairment (Mechanical Ventilation, Invasive)  Goal: Effective Communication  Outcome: Met     Problem: Device-Related Complication Risk (Mechanical Ventilation, Invasive)  Goal: Optimal Device Function  Outcome: Met     Problem: Inability to Wean (Mechanical Ventilation, Invasive)  Goal: Mechanical Ventilation Liberation  Outcome: Met     Problem: Nutrition Impairment (Mechanical Ventilation, Invasive)  Goal: Optimal Nutrition Delivery  Outcome: Met     Problem: Skin and Tissue Injury (Mechanical Ventilation, Invasive)  Goal: Absence of Device-Related Skin and Tissue Injury  Outcome: Ongoing, Progressing     Problem: Ventilator-Induced Lung Injury (Mechanical Ventilation, Invasive)  Goal: Absence of Ventilator-Induced Lung Injury  Outcome: Met     Problem: Communication Impairment (Artificial Airway)  Goal: Effective Communication  Outcome: Met     Problem: Device-Related Complication Risk (Artificial Airway)  Goal: Optimal Device Function  Outcome: Met     Problem: Skin and Tissue Injury (Artificial Airway)  Goal: Absence of Device-Related Skin or Tissue Injury  Outcome: Met     Problem: Noninvasive Ventilation Acute  Goal: Effective Unassisted Ventilation and Oxygenation  Outcome: Met     Problem: Fall Injury Risk  Goal: Absence of Fall and Fall-Related Injury  Outcome: Ongoing, Progressing     Problem: Skin Injury Risk Increased  Goal: Skin Health and Integrity  Outcome: Ongoing, Progressing

## 2022-10-02 NOTE — PROGRESS NOTES
"Sydney Bacon is a 76 y.o. female patient.   1. Nonrheumatic aortic valve stenosis    2. Admission for long-term (current) use of anticoagulants    3. Ascending aortic aneurysm    4. Aortic valve disease    5. CAD (coronary artery disease)    6. EKG abnormalities      Past Medical History:   Diagnosis Date    Hyperlipidemia     Hypertension     Hypothyroidism     Stenosis of aortic and mitral valves     Valvular regurgitation      No past surgical history pertinent negatives on file.  Scheduled Meds:   docusate sodium  100 mg Oral BID    enoxaparin  40 mg Subcutaneous Daily    famotidine (PF)  20 mg Intravenous Daily    folic acid  1 mg Oral Daily    loperamide  4 mg Oral Once    metoprolol tartrate  12.5 mg Oral BID    nitrofurantoin  100 mg Oral Q12H    sucralfate  1 g Oral QID (AC & HS)     Continuous Infusions:   amiodarone in dextrose 5% 0.5 mg/min (10/02/22 1116)     PRN Meds:sodium chloride, albumin human 5%, calcium gluconate IVPB, calcium gluconate IVPB, calcium gluconate IVPB, dextrose 10%, dextrose 10%, HYDROcodone-acetaminophen, lactulose 10 gram/15 ml, magnesium sulfate IVPB, magnesium sulfate IVPB, metoclopramide HCl, morphine, ondansetron, potassium chloride in water, potassium chloride in water, sodium chloride 0.9%, sodium chloride 0.9%, sodium phosphate IVPB, sodium phosphate IVPB, sodium phosphate IVPB    Review of patient's allergies indicates:   Allergen Reactions    Ace inhibitors     Aspirin     Atorvastatin     Penicillins      There are no hospital problems to display for this patient.    Blood pressure 134/74, pulse 74, temperature 97.3 °F (36.3 °C), resp. rate (!) 22, height 5' 3" (1.6 m), weight 66.3 kg (146 lb 3.2 oz), SpO2 (!) 92 %, not currently breastfeeding.    SUBJECTIVE:  POD #4  Awake. Alert.   Sitting up in chair  Afib yesterday. On amio gtt     OBJECTIVE:  AFVSS 92% on 4L NC. Wt up  Heart: RRR  Lungs: respirations nonlabored, clear  Incision: c/d/I  Cxr:   Interval removal of " mediastinal drainage catheters.   There is persistent blunting of both costophrenic angles and posterior sulci indicating the presence of bilateral pleural effusions.   Persistent increased left retrocardiac density and silhouetting of the left hemidiaphragm although these might be related to pleural fluid may also represent an infiltrate/atelectasis.   No other change             A/P:  S/P AVR/AAA Repair 9/28  Afib per cardiology  - diurese  - ambulate  - IS  - await telemetry         ROSANGELA Cadet  10/2/2022

## 2022-10-02 NOTE — CONSULTS
Inpatient consult to Cardiology  Consult performed by: JUNE Ramirez  Consult ordered by: ROSANGELA Xiao    [facility] - [facility]  Cardiology  Consult Note    Patient Name: Sydney Bacon  MRN: [id number]  Admission Date: 9/28/2022  Hospital Length of Stay: 4 days  Code Status: Full Code   Attending Provider: Wing Edmonds IV, MD   Consulting Provider: JUNE Ramirez  Primary Care Physician: Valente Bacon MD  Principal Problem:<principal problem not specified>    Patient information was obtained from patient, past medical records, and ER records.     Subjective:     Chief Complaint: PAF    HPI: Ms. Bacon is a 77 y/o female who is known to [facility], Dr. Mason. She was recently worked up for VHD and found to have a AAA and Mod AR, MI/MR. She was referred to [facility] for evaluation of surgical intervention. On 9.28.22 she underwent a AAA Resection and Placement of 32mm Interposition Graft with AVR 25mm Mosaic Porcine Valve as well as a BOB Ligation with EndoStapler. She tolerated the procedure well and was admitted to ICU post operatively. She became tachycardic and was found to be in A.Fib with RVR. [facility] was consulted for this reason.     PMH: VHD (Mod AR, MI/MR), Chronic Systolic HF, Hypothyroidism, HTN, HLD  PSH: Cataract Surgery, Angiogram   Family History: Father, D, AAA; Mother, D, CVA, CAD  Social History:     Previous Cardiac Diagnostics:   C 9.7.22:  Normal Coronaries  LVEF 45%    ECHO 7.21.22:  The study quality is good.   The left ventricle is moderately enlarged. Normal wall thicknesses. Global left ventricular systolic function is mildly decreased. The left ventricular ejection fraction is 45%. The left ventricle diastolic function is impaired (Grade I) with normal left atrial pressure.  The left atrium is moderately enlarged.  Dilation of the aortic root and ascending aorta is noted. Aortic root diameter is 3.8 cms. Ascending aorta diameter is 4.1 cms.   Severe (4+)  eccentric aortic regurgitation. ZSN=[id number]. Diastolic flow reversal is seen in the descending aorta.  Mild (1+) mitral regurgitation. Mild (1+) tricuspid regurgitation.   The estimated pulmonary artery systolic pressure is 24 mmHg assuming a right atrial pressure of 3 mmHg.     PET 5.18.18:  This is an abnormal perfusion study.   This scan is suggestive of moderate risk for future cardiovascular events.   Small partially reversible perfusion abnormality of moderate intensity in the apical segment. Small reversible perfusion abnormality of moderate intensity in the anterior septal region. Small fixed perfusion abnormality of mild intensity in the apical inferior segment.   The left ventricular cavity is noted to be markedly enlarged on the stress studies. The stress left ventricular ejection fraction was calculated to be 36% and left ventricular global function is moderately reduced. The rest left ventricular cavity is noted to be moderately enlarged. The rest left ventricular ejection fraction was calculated to be 31% and rest left ventricular global function is moderately reduced.   When compared to the resting ejection fraction (31%), the stress ejection fraction (36%) has increased.   The study quality is good.     Review of patient's allergies indicates:   Allergen Reactions    Ace inhibitors     Aspirin     Atorvastatin     Penicillins      No current facility-administered medications on file prior to encounter.     Current Outpatient Medications on File Prior to Encounter   Medication Sig    b complex vitamins tablet Take 1 tablet by mouth once daily.    calcium citrate (CALCITRATE) 200 mg (950 mg) tablet Take 1 tablet by mouth once daily.    coenzyme Q10 100 mg capsule Take 100 mg by mouth once daily.    enzymes,digestive (DIGESTIVE ENZYMES ORAL) Take 1 capsule by mouth once daily.    famotidine (PEPCID) 20 MG tablet Take 20 mg by mouth daily as needed for Heartburn.    furosemide (LASIX) 40 MG tablet Take 40  mg by mouth once daily.    glucos sul 2KCl/msm/chond/C/Mn (GLUCOSAMINE CHONDROITIN ORAL) Take 1 tablet by mouth Daily.    levothyroxine (SYNTHROID) 75 MCG tablet Take 75 mcg by mouth once daily.    loratadine (CLARITIN) 10 mg tablet Take 10 mg by mouth once daily.    losartan (COZAAR) 100 MG tablet Take 100 mg by mouth once daily.    metoprolol succinate (TOPROL-XL) 50 MG 24 hr tablet Take 50 mg by mouth once daily.    multivitamin with minerals tablet Take 1 tablet by mouth once daily.    potassium chloride SA (K-DUR,KLOR-CON) 20 MEQ tablet Take 20 mEq by mouth once daily.    raloxifene (EVISTA) 60 mg tablet Take 60 mg by mouth once daily.    simvastatin (ZOCOR) 20 MG tablet Take 20 mg by mouth nightly.    spironolactone (ALDACTONE) 25 MG tablet Take 25 mg by mouth once daily.    vit A/vit C/vit E/zinc/copper (PRESERVISION AREDS ORAL) Take 1 capsule by mouth 2 (two) times daily.    vitamin D (VITAMIN D3) 1000 units Tab Take 1,000 Units by mouth 2 (two) times a day.     Review of Systems   Constitutional:  Positive for fatigue.   Respiratory:  Negative for chest tightness and shortness of breath.    Cardiovascular:  Positive for chest pain. Negative for palpitations and leg swelling.        Incisional   All other systems reviewed and are negative.    Objective:     Vital Signs (Most Recent):  Temp: 98.5 °F (36.9 °C) (10/02/22 0403)  Pulse: 70 (10/02/22 0500)  Resp: 11 (10/02/22 0500)  BP: 109/71 (10/02/22 0500)  SpO2: (!) 92 % (10/02/22 0500)   Vital Signs (24h Range):  Temp:  [98.2 °F (36.8 °C)-99 °F (37.2 °C)] 98.5 °F (36.9 °C)  Pulse:  [] 70  Resp:  [11-25] 11  SpO2:  [88 %-97 %] 92 %  BP: ()/(55-93) 109/71     Weight: 66.3 kg (146 lb 3.2 oz)  Body mass index is 25.9 kg/m².    SpO2: (!) 92 %  O2 Device (Oxygen Therapy): nasal cannula      Intake/Output Summary (Last 24 hours) at 10/2/2022 0743  Last data filed at 10/2/2022 0125  Gross per 24 hour   Intake 635 ml   Output 870 ml   Net -235 ml      Lines/Drains/Airways       Drain  Duration             Female External Urinary Catheter 10/01/22 2140 <1 day              Peripheral Intravenous Line  Duration                  Peripheral IV - Single Lumen 09/28/22 0540 18 G Left Antecubital 4 days         Peripheral IV - Single Lumen 10/01/22 1800 20 G Anterior;Left Upper Arm <1 day                  Significant Labs:  Recent Results (from the past 72 hour(s))   POCT glucose    Collection Time: 09/29/22  9:11 AM   Result Value Ref Range    POCT Glucose 137 (H) 70 - 110 mg/dL   POCT glucose    Collection Time: 09/29/22 10:53 AM   Result Value Ref Range    POCT Glucose 133 (H) 70 - 110 mg/dL   POCT Glucose, Hand-Held Device    Collection Time: 09/29/22 12:30 PM   Result Value Ref Range    POC Glucose 110 70 - 110 MG/DL   POCT glucose    Collection Time: 09/29/22 12:32 PM   Result Value Ref Range    POCT Glucose 110 70 - 110 mg/dL   POCT glucose    Collection Time: 09/29/22  4:37 PM   Result Value Ref Range    POCT Glucose 102 70 - 110 mg/dL   POCT glucose    Collection Time: 09/29/22 10:15 PM   Result Value Ref Range    POCT Glucose 99 70 - 110 mg/dL   Basic metabolic panel    Collection Time: 09/30/22  1:16 AM   Result Value Ref Range    Sodium Level 134 (L) 136 - 145 mmol/L    Potassium Level 3.8 3.5 - 5.1 mmol/L    Chloride 108 (H) 98 - 107 mmol/L    Carbon Dioxide 19 (L) 23 - 31 mmol/L    Glucose Level 104 82 - 115 mg/dL    Blood Urea Nitrogen 11.3 9.8 - 20.1 mg/dL    Creatinine 0.74 0.55 - 1.02 mg/dL    BUN/Creatinine Ratio 15     Calcium Level Total 8.2 (L) 8.4 - 10.2 mg/dL    Anion Gap 7.0 mEq/L    eGFR >60 mls/min/1.73/m2   CBC with Differential    Collection Time: 09/30/22  1:16 AM   Result Value Ref Range    WBC 13.0 (H) 4.5 - 11.5 x10(3)/mcL    RBC 4.10 (L) 4.20 - 5.40 x10(6)/mcL    Hgb 12.7 12.0 - 16.0 gm/dL    Hct 38.2 37.0 - 47.0 %    MCV 93.2 80.0 - 94.0 fL    MCH 31.0 27.0 - 31.0 pg    MCHC 33.2 33.0 - 36.0 mg/dL    RDW 14.6 11.5 - 17.0 %     Platelet 105 (L) 130 - 400 x10(3)/mcL    MPV 10.4 7.4 - 10.4 fL    Neut % 82.7 %    Lymph % 7.9 %    Mono % 8.4 %    Eos % 0.1 %    Basophil % 0.2 %    Lymph # 1.02 0.6 - 4.6 x10(3)/mcL    Neut # 10.7 (H) 2.1 - 9.2 x10(3)/mcL    Mono # 1.09 0.1 - 1.3 x10(3)/mcL    Eos # 0.01 0 - 0.9 x10(3)/mcL    Baso # 0.02 0 - 0.2 x10(3)/mcL    IG# 0.09 (H) 0 - 0.04 x10(3)/mcL    IG% 0.7 %    NRBC% 0.0 %   POCT glucose    Collection Time: 09/30/22  6:32 AM   Result Value Ref Range    POCT Glucose 78 70 - 110 mg/dL   POCT glucose    Collection Time: 09/30/22 10:28 AM   Result Value Ref Range    POCT Glucose 85 70 - 110 mg/dL   POCT glucose    Collection Time: 09/30/22  3:02 PM   Result Value Ref Range    POCT Glucose 87 70 - 110 mg/dL   POCT glucose    Collection Time: 09/30/22  6:28 PM   Result Value Ref Range    POCT Glucose 91 70 - 110 mg/dL   POCT glucose    Collection Time: 09/30/22 10:20 PM   Result Value Ref Range    POCT Glucose 82 70 - 110 mg/dL   POCT glucose    Collection Time: 10/01/22 12:41 AM   Result Value Ref Range    POCT Glucose 83 70 - 110 mg/dL   Basic metabolic panel    Collection Time: 10/01/22  1:34 AM   Result Value Ref Range    Sodium Level 140 136 - 145 mmol/L    Potassium Level 3.8 3.5 - 5.1 mmol/L    Chloride 111 (H) 98 - 107 mmol/L    Carbon Dioxide 20 (L) 23 - 31 mmol/L    Glucose Level 90 82 - 115 mg/dL    Blood Urea Nitrogen 10.5 9.8 - 20.1 mg/dL    Creatinine 0.65 0.55 - 1.02 mg/dL    BUN/Creatinine Ratio 16     Calcium Level Total 8.2 (L) 8.4 - 10.2 mg/dL    Anion Gap 9.0 mEq/L    eGFR >60 mls/min/1.73/m2   CBC with Differential    Collection Time: 10/01/22  1:34 AM   Result Value Ref Range    WBC 10.9 4.5 - 11.5 x10(3)/mcL    RBC 4.45 4.20 - 5.40 x10(6)/mcL    Hgb 13.6 12.0 - 16.0 gm/dL    Hct 41.2 37.0 - 47.0 %    MCV 92.6 80.0 - 94.0 fL    MCH 30.6 27.0 - 31.0 pg    MCHC 33.0 33.0 - 36.0 mg/dL    RDW 13.9 11.5 - 17.0 %    Platelet 122 (L) 130 - 400 x10(3)/mcL    MPV 10.1 7.4 - 10.4 fL    Neut %  81.4 %    Lymph % 7.9 %    Mono % 9.7 %    Eos % 0.3 %    Basophil % 0.3 %    Lymph # 0.86 0.6 - 4.6 x10(3)/mcL    Neut # 8.9 2.1 - 9.2 x10(3)/mcL    Mono # 1.05 0.1 - 1.3 x10(3)/mcL    Eos # 0.03 0 - 0.9 x10(3)/mcL    Baso # 0.03 0 - 0.2 x10(3)/mcL    IG# 0.04 0 - 0.04 x10(3)/mcL    IG% 0.4 %    NRBC% 0.0 %   POCT glucose    Collection Time: 10/01/22 11:26 AM   Result Value Ref Range    POCT Glucose 111 (H) 70 - 110 mg/dL   POCT glucose    Collection Time: 10/01/22  4:48 PM   Result Value Ref Range    POCT Glucose 107 70 - 110 mg/dL   POCT glucose    Collection Time: 10/01/22  8:44 PM   Result Value Ref Range    POCT Glucose 110 70 - 110 mg/dL   POCT glucose    Collection Time: 10/02/22  1:38 AM   Result Value Ref Range    POCT Glucose 96 70 - 110 mg/dL     Significant Imaging: None for Review    EKG:    Results for orders placed or performed during the hospital encounter of 09/28/22   EKG 12-lead    Narrative    Test Reason : R94.31,    Vent. Rate : 147 BPM     Atrial Rate : 000 BPM     P-R Int : 000 ms          QRS Dur : 090 ms      QT Int : 314 ms       P-R-T Axes : 000 077 266 degrees     QTc Int : 491 ms    Atrial fibrillation with rapid ventricular response  Marked ST abnormality, possible inferior subendocardial injury  Abnormal ECG  Confirmed by Michelet Giron MD ([id number]) on 10/1/2022 7:16:37 PM    Referred By: MIKE PARKER IV           Confirmed By:Michelet Giron MD     Telemetry:       Physical Exam  Constitutional:       Appearance: Normal appearance.   HENT:      Head: Normocephalic.      Mouth/Throat:      Mouth: Mucous membranes are moist.   Eyes:      Extraocular Movements: Extraocular movements intact.      Pupils: Pupils are equal, round, and reactive to light.   Cardiovascular:      Rate and Rhythm: Normal rate and regular rhythm.      Pulses: Normal pulses.   Pulmonary:      Effort: Pulmonary effort is normal.      Breath sounds: Normal breath sounds.   Abdominal:      Palpations: Abdomen is soft.    Musculoskeletal:         General: No swelling. Normal range of motion.   Skin:     General: Skin is warm and dry.      Comments: Midline Sternotomy Dressing C/D/I. + CTs   Neurological:      General: No focal deficit present.      Mental Status: She is alert and oriented to person, place, and time.   Psychiatric:         Mood and Affect: Mood normal.         Behavior: Behavior normal.     Home Medications:   No current facility-administered medications on file prior to encounter.     Current Outpatient Medications on File Prior to Encounter   Medication Sig Dispense Refill    b complex vitamins tablet Take 1 tablet by mouth once daily.      calcium citrate (CALCITRATE) 200 mg (950 mg) tablet Take 1 tablet by mouth once daily.      coenzyme Q10 100 mg capsule Take 100 mg by mouth once daily.      enzymes,digestive (DIGESTIVE ENZYMES ORAL) Take 1 capsule by mouth once daily.      famotidine (PEPCID) 20 MG tablet Take 20 mg by mouth daily as needed for Heartburn.      furosemide (LASIX) 40 MG tablet Take 40 mg by mouth once daily.      glucos sul 2KCl/msm/chond/C/Mn (GLUCOSAMINE CHONDROITIN ORAL) Take 1 tablet by mouth Daily.      levothyroxine (SYNTHROID) 75 MCG tablet Take 75 mcg by mouth once daily.      loratadine (CLARITIN) 10 mg tablet Take 10 mg by mouth once daily.      losartan (COZAAR) 100 MG tablet Take 100 mg by mouth once daily.      metoprolol succinate (TOPROL-XL) 50 MG 24 hr tablet Take 50 mg by mouth once daily.      multivitamin with minerals tablet Take 1 tablet by mouth once daily.      potassium chloride SA (K-DUR,KLOR-CON) 20 MEQ tablet Take 20 mEq by mouth once daily.      raloxifene (EVISTA) 60 mg tablet Take 60 mg by mouth once daily.      simvastatin (ZOCOR) 20 MG tablet Take 20 mg by mouth nightly.      spironolactone (ALDACTONE) 25 MG tablet Take 25 mg by mouth once daily.      vit A/vit C/vit E/zinc/copper (PRESERVISION AREDS ORAL) Take 1 capsule by mouth 2 (two) times daily.      vitamin  D (VITAMIN D3) 1000 units Tab Take 1,000 Units by mouth 2 (two) times a day.       Current Inpatient Medications:    Current Facility-Administered Medications:     0.9%  NaCl infusion (for blood administration), , Intravenous, Q24H PRN, Wing Edmonds IV, MD    albumin human 5% bottle 12.5 g, 12.5 g, Intravenous, PRN, ROSANGELA Alfonso, Stopped at 09/29/22 0258    amiodarone 360 mg/200 mL (1.8 mg/mL) infusion, 0.5 mg/min, Intravenous, Continuous, Beto Valencia, [facility], Last Rate: 16.7 mL/hr at 10/01/22 2130, 0.5 mg/min at 10/01/22 2130    calcium gluconate 1 g in NS IVPB (premixed), 1 g, Intravenous, PRN, ROSANGELA Alfonso    calcium gluconate 1 g in NS IVPB (premixed), 2 g, Intravenous, PRN, ROSANGELA Alfonso    calcium gluconate 1 g in NS IVPB (premixed), 3 g, Intravenous, PRN, ROSANGELA Alfonso    dextrose 10% bolus 125 mL, 12.5 g, Intravenous, PRN, Wing Edmonds IV, MD    dextrose 10% bolus 250 mL, 25 g, Intravenous, PRN, Wing Edmonds IV, MD    docusate sodium capsule 100 mg, 100 mg, Oral, BID, ROSANGELA Alfonso, 100 mg at 10/01/22 2036    enoxaparin injection 40 mg, 40 mg, Subcutaneous, Daily, ROSANGELA Alfonso, 40 mg at 10/01/22 1647    famotidine (PF) injection 20 mg, 20 mg, Intravenous, Daily, ROSANGELA Alfonso, 20 mg at 10/01/22 0832    folic acid tablet 1 mg, 1 mg, Oral, Daily, ROSANGELA Alfonso 1 mg at 10/01/22 0832    glucagon (human recombinant) injection 1 mg, 1 mg, Intramuscular, PRN, Wing Edmonds IV, MD    glucose chewable tablet 16 g, 16 g, Oral, PRN, Wing Edmonds IV, MD    glucose chewable tablet 24 g, 24 g, Oral, PRN, Wing Edmonds IV, MD    HYDROcodone-acetaminophen 5-325 mg per tablet 1 tablet, 1 tablet, Oral, Q4H PRN, ROSANGELA Alfonso, 1 tablet at 10/02/22 0347    insulin aspart U-100 injection 0-5 Units, 0-5 Units, Subcutaneous, Q4H, Wing Edmonds IV, MD    lactulose 10 gram/15 ml solution 20 g, 20 g, Oral, Q6H PRN, ROSANGELA Alfonso    loperamide capsule 4 mg, 4 mg, Oral, Once, Eliseo Lentz,  PA    magnesium sulfate 2g in water 50mL IVPB (premix), 2 g, Intravenous, PRN, ROSANGELA Alfonso    magnesium sulfate 2g in water 50mL IVPB (premix), 4 g, Intravenous, PRN, ROSANGELA Alfonso    metoclopramide HCl injection 5 mg, 5 mg, Intravenous, Q6H PRN, ORSANGELA Alfonso    metoprolol tartrate (LOPRESSOR) split tablet 12.5 mg, 12.5 mg, Oral, BID, ROSANGELA Alfonso, 12.5 mg at 10/01/22 2035    morphine injection 2 mg, 2 mg, Intravenous, PRN, ROSANGELA Alfonso    nitrofurantoin capsule 100 mg, 100 mg, Oral, Q12H, Wing Edmonds IV, MD, 100 mg at 10/01/22 2035    ondansetron injection 4 mg, 4 mg, Intravenous, Q12H PRN, ROSANGELA Alfonso, 4 mg at 10/01/22 1811    potassium chloride 20 mEq in 100 mL IVPB (FOR CENTRAL LINE ADMINISTRATION ONLY), 20 mEq, Intravenous, PRN, ROSANGELA Alfonso, Last Rate: 50 mL/hr at 09/30/22 0236, 20 mEq at 09/30/22 0236    potassium chloride 20 mEq in 100 mL IVPB (FOR CENTRAL LINE ADMINISTRATION ONLY), 40 mEq, Intravenous, PRN, ROSANGELA Alfonso, Last Rate: 25 mL/hr at 09/28/22 1741, 40 mEq at 09/28/22 1741    sodium chloride 0.9% flush 10 mL, 10 mL, Intravenous, PRN, Ciro Rg MD    sodium chloride 0.9% flush 2 mL, 2 mL, Intravenous, PRN, ROSANGELA Alfonso    sodium phosphate 15 mmol in dextrose 5 % 250 mL IVPB, 15 mmol, Intravenous, PRN, ROSANGELA Alfonso    sodium phosphate 20.01 mmol in dextrose 5 % 250 mL IVPB, 20.01 mmol, Intravenous, PRN, ROSANGELA Alfonso    sodium phosphate 30 mmol in dextrose 5 % 250 mL IVPB, 30 mmol, Intravenous, PRN, ROSANGELA Alfonso    sucralfate tablet 1 g, 1 g, Oral, QID (AC & HS), ROSANGELA Alfonso, 1 g at 10/02/22 0550    VTE Risk Mitigation (From admission, onward)           Ordered     enoxaparin injection 40 mg  Daily         09/28/22 1021     Place sequential compression device  Until discontinued         09/28/22 1021     IP VTE HIGH RISK PATIENT  Once         09/28/22 1010                  Assessment:   VHD (Mod AR, Mild MI/MR)    - s/p (9.28.22) - AVR 25mm Mosaic Porcine  Valve  AAA    - s/p (9.28.22) - AAA Resection and Placement of 32mm Interposition Graft  PAF with RVR - Now SR    - CHADsVASc - 5 Points - 7.2% Stroke Risk per Year     - s/p BOB Ligation (9.28.22) with Endostapler  Chronic Systolic HF/EF 45% - Compensated  Hx of Hypothyroidism  HTN  HLD  No Hx of GIB    Plan:   Continue IV Amiodarone and Will Transition to Oral Amiodarone Load in AM (10.3.22)  Aggressive Mobilization of PT and Q1HR IS  Continue BB  No ASA Secondary to Allergy  No Anticoagulation secondary to BOB Ligation at this time; Defer to OP Cardiologist  Labs in AM: CBC, CMP and Mg    Thank you for your consult.     JUNE Ramirez  Cardiology  [facility]  10/02/2022 7:43 AM

## 2022-10-03 LAB
ALBUMIN SERPL-MCNC: 3.2 GM/DL (ref 3.4–4.8)
ALBUMIN/GLOB SERPL: 1.1 RATIO (ref 1.1–2)
ALP SERPL-CCNC: 114 UNIT/L (ref 40–150)
ALT SERPL-CCNC: 27 UNIT/L (ref 0–55)
AST SERPL-CCNC: 31 UNIT/L (ref 5–34)
BASOPHILS # BLD AUTO: 0.04 X10(3)/MCL (ref 0–0.2)
BASOPHILS NFR BLD AUTO: 0.4 %
BILIRUBIN DIRECT+TOT PNL SERPL-MCNC: 0.8 MG/DL
BUN SERPL-MCNC: 10.8 MG/DL (ref 9.8–20.1)
CALCIUM SERPL-MCNC: 8.6 MG/DL (ref 8.4–10.2)
CHLORIDE SERPL-SCNC: 103 MMOL/L (ref 98–107)
CO2 SERPL-SCNC: 26 MMOL/L (ref 23–31)
CREAT SERPL-MCNC: 0.66 MG/DL (ref 0.55–1.02)
EOSINOPHIL # BLD AUTO: 0.12 X10(3)/MCL (ref 0–0.9)
EOSINOPHIL NFR BLD AUTO: 1.1 %
ERYTHROCYTE [DISTWIDTH] IN BLOOD BY AUTOMATED COUNT: 13.8 % (ref 11.5–17)
GFR SERPLBLD CREATININE-BSD FMLA CKD-EPI: >60 MLS/MIN/1.73/M2
GLOBULIN SER-MCNC: 3 GM/DL (ref 2.4–3.5)
GLUCOSE SERPL-MCNC: 98 MG/DL (ref 82–115)
HCT VFR BLD AUTO: 45.3 % (ref 37–47)
HGB BLD-MCNC: 15.1 GM/DL (ref 12–16)
IMM GRANULOCYTES # BLD AUTO: 0.03 X10(3)/MCL (ref 0–0.04)
IMM GRANULOCYTES NFR BLD AUTO: 0.3 %
LYMPHOCYTES # BLD AUTO: 2.17 X10(3)/MCL (ref 0.6–4.6)
LYMPHOCYTES NFR BLD AUTO: 20 %
MAGNESIUM SERPL-MCNC: 2 MG/DL (ref 1.6–2.6)
MCH RBC QN AUTO: 30.5 PG (ref 27–31)
MCHC RBC AUTO-ENTMCNC: 33.3 MG/DL (ref 33–36)
MCV RBC AUTO: 91.5 FL (ref 80–94)
MONOCYTES # BLD AUTO: 1.47 X10(3)/MCL (ref 0.1–1.3)
MONOCYTES NFR BLD AUTO: 13.6 %
NEUTROPHILS # BLD AUTO: 7 X10(3)/MCL (ref 2.1–9.2)
NEUTROPHILS NFR BLD AUTO: 64.6 %
NRBC BLD AUTO-RTO: 0 %
PLATELET # BLD AUTO: 192 X10(3)/MCL (ref 130–400)
PMV BLD AUTO: 10.2 FL (ref 7.4–10.4)
POTASSIUM SERPL-SCNC: 3.3 MMOL/L (ref 3.5–5.1)
PROT SERPL-MCNC: 6.2 GM/DL (ref 5.8–7.6)
RBC # BLD AUTO: 4.95 X10(6)/MCL (ref 4.2–5.4)
SODIUM SERPL-SCNC: 140 MMOL/L (ref 136–145)
WBC # SPEC AUTO: 10.8 X10(3)/MCL (ref 4.5–11.5)

## 2022-10-03 PROCEDURE — 36415 COLL VENOUS BLD VENIPUNCTURE: CPT | Performed by: NURSE PRACTITIONER

## 2022-10-03 PROCEDURE — 94799 UNLISTED PULMONARY SVC/PX: CPT

## 2022-10-03 PROCEDURE — 63600175 PHARM REV CODE 636 W HCPCS: Performed by: NURSE PRACTITIONER

## 2022-10-03 PROCEDURE — 25000003 PHARM REV CODE 250: Performed by: SPECIALIST

## 2022-10-03 PROCEDURE — 25000003 PHARM REV CODE 250

## 2022-10-03 PROCEDURE — 20000000 HC ICU ROOM

## 2022-10-03 PROCEDURE — 83735 ASSAY OF MAGNESIUM: CPT | Performed by: NURSE PRACTITIONER

## 2022-10-03 PROCEDURE — 99024 PR POST-OP FOLLOW-UP VISIT: ICD-10-PCS | Mod: POP,,,

## 2022-10-03 PROCEDURE — 99024 POSTOP FOLLOW-UP VISIT: CPT | Mod: POP,,,

## 2022-10-03 PROCEDURE — 94761 N-INVAS EAR/PLS OXIMETRY MLT: CPT

## 2022-10-03 PROCEDURE — 85025 COMPLETE CBC W/AUTO DIFF WBC: CPT | Performed by: NURSE PRACTITIONER

## 2022-10-03 PROCEDURE — 80053 COMPREHEN METABOLIC PANEL: CPT | Performed by: NURSE PRACTITIONER

## 2022-10-03 PROCEDURE — 63600175 PHARM REV CODE 636 W HCPCS

## 2022-10-03 PROCEDURE — 27000221 HC OXYGEN, UP TO 24 HOURS

## 2022-10-03 PROCEDURE — 25000003 PHARM REV CODE 250: Performed by: NURSE PRACTITIONER

## 2022-10-03 RX ORDER — POTASSIUM CHLORIDE 20 MEQ/1
40 TABLET, EXTENDED RELEASE ORAL ONCE
Status: COMPLETED | OUTPATIENT
Start: 2022-10-03 | End: 2022-10-03

## 2022-10-03 RX ORDER — ONDANSETRON 2 MG/ML
8 INJECTION INTRAMUSCULAR; INTRAVENOUS EVERY 8 HOURS PRN
Status: DISCONTINUED | OUTPATIENT
Start: 2022-10-03 | End: 2022-10-12 | Stop reason: HOSPADM

## 2022-10-03 RX ORDER — FUROSEMIDE 10 MG/ML
40 INJECTION INTRAMUSCULAR; INTRAVENOUS
Status: DISCONTINUED | OUTPATIENT
Start: 2022-10-03 | End: 2022-10-11

## 2022-10-03 RX ORDER — METOPROLOL TARTRATE 25 MG/1
25 TABLET, FILM COATED ORAL 3 TIMES DAILY
Status: DISCONTINUED | OUTPATIENT
Start: 2022-10-03 | End: 2022-10-04

## 2022-10-03 RX ADMIN — AMIODARONE HYDROCHLORIDE 0.5 MG/MIN: 1.8 INJECTION, SOLUTION INTRAVENOUS at 12:10

## 2022-10-03 RX ADMIN — NITROFURANTOIN MACROCRYSTALS 100 MG: 50 CAPSULE ORAL at 08:10

## 2022-10-03 RX ADMIN — ENOXAPARIN SODIUM 40 MG: 40 INJECTION SUBCUTANEOUS at 04:10

## 2022-10-03 RX ADMIN — AMIODARONE HYDROCHLORIDE 1 MG/MIN: 1.8 INJECTION, SOLUTION INTRAVENOUS at 03:10

## 2022-10-03 RX ADMIN — POTASSIUM CHLORIDE 40 MEQ: 1500 TABLET, EXTENDED RELEASE ORAL at 05:10

## 2022-10-03 RX ADMIN — FAMOTIDINE 20 MG: 10 INJECTION INTRAVENOUS at 08:10

## 2022-10-03 RX ADMIN — DOCUSATE SODIUM 100 MG: 100 CAPSULE, LIQUID FILLED ORAL at 08:10

## 2022-10-03 RX ADMIN — FOLIC ACID 1 MG: 1 TABLET ORAL at 08:10

## 2022-10-03 RX ADMIN — SUCRALFATE 1 G: 1 TABLET ORAL at 06:10

## 2022-10-03 RX ADMIN — METOPROLOL TARTRATE 12.5 MG: 25 TABLET, FILM COATED ORAL at 08:10

## 2022-10-03 RX ADMIN — HYALURONIDASE (HUMAN RECOMBINANT) 150 UNITS: 150 INJECTION, SOLUTION SUBCUTANEOUS at 02:10

## 2022-10-03 RX ADMIN — ONDANSETRON 4 MG: 2 INJECTION INTRAMUSCULAR; INTRAVENOUS at 06:10

## 2022-10-03 RX ADMIN — SUCRALFATE 1 G: 1 TABLET ORAL at 04:10

## 2022-10-03 RX ADMIN — SUCRALFATE 1 G: 1 TABLET ORAL at 08:10

## 2022-10-03 RX ADMIN — AMIODARONE HYDROCHLORIDE 150 MG: 1.5 INJECTION, SOLUTION INTRAVENOUS at 03:10

## 2022-10-03 RX ADMIN — ONDANSETRON 4 MG: 2 INJECTION INTRAMUSCULAR; INTRAVENOUS at 05:10

## 2022-10-03 RX ADMIN — AMIODARONE HYDROCHLORIDE 1 MG/MIN: 1.8 INJECTION, SOLUTION INTRAVENOUS at 08:10

## 2022-10-03 NOTE — PT/OT/SLP PROGRESS
Physical Therapy      Patient Name:  Sydney Bacon   MRN:  [id number]    Pt currently in AFIB resting between 109-140. Held tx. Will follow up tomorrow.

## 2022-10-03 NOTE — PROGRESS NOTES
"[facility] - [facility]  Cardiology  Progress Note    Patient Name: Sydney Bacon  MRN: [id number]  Admission Date: 9/28/2022  Hospital Length of Stay: 5 days  Code Status: Full Code   Attending Physician: Wing Edomnds IV, MD   Primary Care Physician: Valente Bacon MD  Expected Discharge Date:   Principal Problem:<principal problem not specified>    Subjective:     Brief HPI: Ms. Bacon is a 75 y/o female who is known to [facility], Dr. Mason. She was recently worked up for VHD and found to have a AAA and Mod AR, MI/MR. She was referred to [facility] for evaluation of surgical intervention. On 9.28.22 she underwent a AAA Resection and Placement of 32mm Interposition Graft with AVR 25mm Mosaic Porcine Valve as well as a BOB Ligation with EndoStapler. She tolerated the procedure well and was admitted to ICU post operatively. She became tachycardic and was found to be in A.Fib with RVR. [facility] was consulted for this reason.     Hospital Course:   10.3.22: NAD. Remains in PAF/RVR. IV Infiltration Last PM with Amio. Back on IV Amio at 0.5mg/min. "I am ok." Denies SOB and Palps. + CP/Incisional.     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   The left ventricle is moderately enlarged. Normal wall thicknesses. Global left ventricular systolic function is mildly decreased. The left ventricular ejection fraction is 45%. The left ventricle diastolic function is impaired (Grade I) with normal left atrial pressure.  The left atrium is moderately enlarged.  Dilation of the aortic root and ascending aorta is noted. Aortic root diameter is 3.8 cms. Ascending aorta diameter is 4.1 cms.   Severe (4+) eccentric aortic regurgitation. GOK=[id number]. Diastolic flow reversal " is seen in the descending aorta.  Mild (1+) mitral regurgitation. Mild (1+) tricuspid regurgitation.   The estimated pulmonary artery systolic pressure is 24 mmHg assuming a right atrial pressure of 3 mmHg.      PET 5.18.18:  This is an abnormal perfusion study.   This scan is suggestive of moderate risk for future cardiovascular events.   Small partially reversible perfusion abnormality of moderate intensity in the apical segment. Small reversible perfusion abnormality of moderate intensity in the anterior septal region. Small fixed perfusion abnormality of mild intensity in the apical inferior segment.   The left ventricular cavity is noted to be markedly enlarged on the stress studies. The stress left ventricular ejection fraction was calculated to be 36% and left ventricular global function is moderately reduced. The rest left ventricular cavity is noted to be moderately enlarged. The rest left ventricular ejection fraction was calculated to be 31% and rest left ventricular global function is moderately reduced.   When compared to the resting ejection fraction (31%), the stress ejection fraction (36%) has increased.   The study quality is good.    Review of Systems   Constitutional: Positive for malaise/fatigue.   Cardiovascular:  Positive for chest pain and irregular heartbeat.        Incisional   Respiratory:  Negative for cough and shortness of breath.    All other systems reviewed and are negative.    Objective:     Vital Signs (Most Recent):  Temp: 98.6 °F (37 °C) (10/03/22 1200)  Pulse: (!) 131 (10/03/22 1300)  Resp: (!) 24 (10/03/22 1300)  BP: 102/87 (10/03/22 1300)  SpO2: (!) 94 % (10/03/22 1300)   Vital Signs (24h Range):  Temp:  [97.6 °F (36.4 °C)-99.7 °F (37.6 °C)] 98.6 °F (37 °C)  Pulse:  [] 131  Resp:  [14-25] 24  SpO2:  [76 %-96 %] 94 %  BP: ()/() 102/87     Weight: 65.3 kg (144 lb)  Body mass index is 25.51 kg/m².    SpO2: (!) 94 %  O2 Device (Oxygen Therapy): nasal  cannula      Intake/Output Summary (Last 24 hours) at 10/3/2022 1428  Last data filed at 10/3/2022 1401  Gross per 24 hour   Intake 1064 ml   Output 1550 ml   Net -486 ml     Lines/Drains/Airways       Peripheral Intravenous Line  Duration                  Peripheral IV - Single Lumen 10/03/22 0200 20 G;2 in Right Forearm <1 day                  Significant Labs:   Recent Results (from the past 72 hour(s))   POCT glucose    Collection Time: 09/30/22  3:02 PM   Result Value Ref Range    POCT Glucose 87 70 - 110 mg/dL   POCT glucose    Collection Time: 09/30/22  6:28 PM   Result Value Ref Range    POCT Glucose 91 70 - 110 mg/dL   POCT glucose    Collection Time: 09/30/22 10:20 PM   Result Value Ref Range    POCT Glucose 82 70 - 110 mg/dL   POCT glucose    Collection Time: 10/01/22 12:41 AM   Result Value Ref Range    POCT Glucose 83 70 - 110 mg/dL   Basic metabolic panel    Collection Time: 10/01/22  1:34 AM   Result Value Ref Range    Sodium Level 140 136 - 145 mmol/L    Potassium Level 3.8 3.5 - 5.1 mmol/L    Chloride 111 (H) 98 - 107 mmol/L    Carbon Dioxide 20 (L) 23 - 31 mmol/L    Glucose Level 90 82 - 115 mg/dL    Blood Urea Nitrogen 10.5 9.8 - 20.1 mg/dL    Creatinine 0.65 0.55 - 1.02 mg/dL    BUN/Creatinine Ratio 16     Calcium Level Total 8.2 (L) 8.4 - 10.2 mg/dL    Anion Gap 9.0 mEq/L    eGFR >60 mls/min/1.73/m2   CBC with Differential    Collection Time: 10/01/22  1:34 AM   Result Value Ref Range    WBC 10.9 4.5 - 11.5 x10(3)/mcL    RBC 4.45 4.20 - 5.40 x10(6)/mcL    Hgb 13.6 12.0 - 16.0 gm/dL    Hct 41.2 37.0 - 47.0 %    MCV 92.6 80.0 - 94.0 fL    MCH 30.6 27.0 - 31.0 pg    MCHC 33.0 33.0 - 36.0 mg/dL    RDW 13.9 11.5 - 17.0 %    Platelet 122 (L) 130 - 400 x10(3)/mcL    MPV 10.1 7.4 - 10.4 fL    Neut % 81.4 %    Lymph % 7.9 %    Mono % 9.7 %    Eos % 0.3 %    Basophil % 0.3 %    Lymph # 0.86 0.6 - 4.6 x10(3)/mcL    Neut # 8.9 2.1 - 9.2 x10(3)/mcL    Mono # 1.05 0.1 - 1.3 x10(3)/mcL    Eos # 0.03 0 - 0.9  x10(3)/mcL    Baso # 0.03 0 - 0.2 x10(3)/mcL    IG# 0.04 0 - 0.04 x10(3)/mcL    IG% 0.4 %    NRBC% 0.0 %   POCT glucose    Collection Time: 10/01/22 11:26 AM   Result Value Ref Range    POCT Glucose 111 (H) 70 - 110 mg/dL   POCT glucose    Collection Time: 10/01/22  4:48 PM   Result Value Ref Range    POCT Glucose 107 70 - 110 mg/dL   POCT glucose    Collection Time: 10/01/22  8:44 PM   Result Value Ref Range    POCT Glucose 110 70 - 110 mg/dL   POCT glucose    Collection Time: 10/02/22  1:38 AM   Result Value Ref Range    POCT Glucose 96 70 - 110 mg/dL   Comprehensive Metabolic Panel    Collection Time: 10/03/22  1:31 AM   Result Value Ref Range    Sodium Level 140 136 - 145 mmol/L    Potassium Level 3.3 (L) 3.5 - 5.1 mmol/L    Chloride 103 98 - 107 mmol/L    Carbon Dioxide 26 23 - 31 mmol/L    Glucose Level 98 82 - 115 mg/dL    Blood Urea Nitrogen 10.8 9.8 - 20.1 mg/dL    Creatinine 0.66 0.55 - 1.02 mg/dL    Calcium Level Total 8.6 8.4 - 10.2 mg/dL    Protein Total 6.2 5.8 - 7.6 gm/dL    Albumin Level 3.2 (L) 3.4 - 4.8 gm/dL    Globulin 3.0 2.4 - 3.5 gm/dL    Albumin/Globulin Ratio 1.1 1.1 - 2.0 ratio    Bilirubin Total 0.8 <=1.5 mg/dL    Alkaline Phosphatase 114 40 - 150 unit/L    Alanine Aminotransferase 27 0 - 55 unit/L    Aspartate Aminotransferase 31 5 - 34 unit/L    eGFR >60 mls/min/1.73/m2   Magnesium    Collection Time: 10/03/22  1:31 AM   Result Value Ref Range    Magnesium Level 2.00 1.60 - 2.60 mg/dL   CBC with Differential    Collection Time: 10/03/22  1:31 AM   Result Value Ref Range    WBC 10.8 4.5 - 11.5 x10(3)/mcL    RBC 4.95 4.20 - 5.40 x10(6)/mcL    Hgb 15.1 12.0 - 16.0 gm/dL    Hct 45.3 37.0 - 47.0 %    MCV 91.5 80.0 - 94.0 fL    MCH 30.5 27.0 - 31.0 pg    MCHC 33.3 33.0 - 36.0 mg/dL    RDW 13.8 11.5 - 17.0 %    Platelet 192 130 - 400 x10(3)/mcL    MPV 10.2 7.4 - 10.4 fL    Neut % 64.6 %    Lymph % 20.0 %    Mono % 13.6 %    Eos % 1.1 %    Basophil % 0.4 %    Lymph # 2.17 0.6 - 4.6 x10(3)/mcL     Neut # 7.0 2.1 - 9.2 x10(3)/mcL    Mono # 1.47 (H) 0.1 - 1.3 x10(3)/mcL    Eos # 0.12 0 - 0.9 x10(3)/mcL    Baso # 0.04 0 - 0.2 x10(3)/mcL    IG# 0.03 0 - 0.04 x10(3)/mcL    IG% 0.3 %    NRBC% 0.0 %     Telemetry: PAF/RVR    Physical Exam  Constitutional:       Appearance: Normal appearance.   HENT:      Head: Normocephalic.      Mouth/Throat:      Mouth: Mucous membranes are moist.   Eyes:      Extraocular Movements: Extraocular movements intact.   Cardiovascular:      Rate and Rhythm: Tachycardia present. Rhythm irregular.      Pulses: Normal pulses.   Pulmonary:      Effort: Pulmonary effort is normal.      Breath sounds: Rhonchi and rales present.   Abdominal:      Palpations: Abdomen is soft.   Musculoskeletal:         General: Normal range of motion.   Skin:     General: Skin is warm and dry.      Capillary Refill: Capillary refill takes less than 2 seconds.      Comments: Midline Sternotomy Dressing C/D/I. + CTs    Neurological:      General: No focal deficit present.      Mental Status: She is alert and oriented to person, place, and time.   Psychiatric:         Mood and Affect: Mood normal.         Behavior: Behavior normal.     Current Inpatient Medications:    Current Facility-Administered Medications:     0.9%  NaCl infusion (for blood administration), , Intravenous, Q24H PRN, Wing Edmonds IV, MD    albumin human 5% bottle 12.5 g, 12.5 g, Intravenous, PRN, ROSANGELA Alfonso, Stopped at 09/29/22 0258    amiodarone 360 mg/200 mL (1.8 mg/mL) infusion, 0.5 mg/min, Intravenous, Continuous, Beto Valencia AGACNP-BC, Last Rate: 16.7 mL/hr at 10/02/22 2354, 0.5 mg/min at 10/02/22 2354    calcium gluconate 1 g in NS IVPB (premixed), 1 g, Intravenous, PRN, ROSANGELA Alfonso    calcium gluconate 1 g in NS IVPB (premixed), 2 g, Intravenous, PRN, ROSANGELA Alfonso    calcium gluconate 1 g in NS IVPB (premixed), 3 g, Intravenous, PRN, ROSANGELA Alfonso    dextrose 10% bolus 125 mL, 12.5 g, Intravenous, PRN, Wing HOFFMAN  Kendal MAYORGA MD    dextrose 10% bolus 250 mL, 25 g, Intravenous, PRN, Wing Edmonds IV, MD    docusate sodium capsule 100 mg, 100 mg, Oral, BID, ROSANGELA Alfonso, 100 mg at 10/03/22 0855    enoxaparin injection 40 mg, 40 mg, Subcutaneous, Daily, ROSANGELA Alfonso, 40 mg at 10/02/22 1757    famotidine (PF) injection 20 mg, 20 mg, Intravenous, Daily, ROSANGELA Alfonso, 20 mg at 10/03/22 0856    folic acid tablet 1 mg, 1 mg, Oral, Daily, ROSANGELA Alfonso, 1 mg at 10/03/22 0856    HYDROcodone-acetaminophen 5-325 mg per tablet 1 tablet, 1 tablet, Oral, Q4H PRN, ROSANGELA Alfonso, 1 tablet at 10/02/22 2120    lactulose 10 gram/15 ml solution 20 g, 20 g, Oral, Q6H PRN, ROSANGELA Alfonso    loperamide capsule 4 mg, 4 mg, Oral, Once, ROSANGELA Alfonso    magnesium sulfate 2g in water 50mL IVPB (premix), 2 g, Intravenous, PRN, ROSANGELA Alfonso    magnesium sulfate 2g in water 50mL IVPB (premix), 4 g, Intravenous, PRN, ROSANGELA Alfonso    metoclopramide HCl injection 5 mg, 5 mg, Intravenous, Q6H PRN, ROSANGELA Alfonso    metoprolol tartrate (LOPRESSOR) split tablet 12.5 mg, 12.5 mg, Oral, BID, ROSANGELA Alfonso, 12.5 mg at 10/03/22 0855    morphine injection 2 mg, 2 mg, Intravenous, PRN, ROSANGELA Alfonso    nitrofurantoin capsule 100 mg, 100 mg, Oral, Q12H, Wing Edmonds IV, MD, 100 mg at 10/03/22 0857    ondansetron injection 4 mg, 4 mg, Intravenous, Q12H PRN, ROSANGELA Alfonso, 4 mg at 10/03/22 0654    potassium chloride 20 mEq in 100 mL IVPB (FOR CENTRAL LINE ADMINISTRATION ONLY), 20 mEq, Intravenous, PRN, ROSANGELA Alfonso, Last Rate: 50 mL/hr at 09/30/22 0236, 20 mEq at 09/30/22 0236    potassium chloride 20 mEq in 100 mL IVPB (FOR CENTRAL LINE ADMINISTRATION ONLY), 40 mEq, Intravenous, PRN, ROSANGELA Alfonso, Last Rate: 25 mL/hr at 09/28/22 1741, 40 mEq at 09/28/22 1741    sodium chloride 0.9% flush 10 mL, 10 mL, Intravenous, PRN, Ciro Rg MD    sodium chloride 0.9% flush 2 mL, 2 mL, Intravenous, PRN, ROSANGELA Alfonso    sodium phosphate 15 mmol in  dextrose 5 % 250 mL IVPB, 15 mmol, Intravenous, PRN, ROSANGELA Alfonso    sodium phosphate 20.01 mmol in dextrose 5 % 250 mL IVPB, 20.01 mmol, Intravenous, PRN, ROSANGELA Alfonso    sodium phosphate 30 mmol in dextrose 5 % 250 mL IVPB, 30 mmol, Intravenous, PRN, ROSANGELA Alfonso    sucralfate tablet 1 g, 1 g, Oral, QID (AC & HS), ROSANGELA Alfonso, 1 g at 10/03/22 0615    VTE Risk Mitigation (From admission, onward)           Ordered     enoxaparin injection 40 mg  Daily         09/28/22 1021     Place sequential compression device  Until discontinued         09/28/22 1021     IP VTE HIGH RISK PATIENT  Once         09/28/22 1010                  Assessment:   VHD (Mod AR, Mild MI/MR)    - s/p (9.28.22) - AVR 25mm Mosaic Porcine Valve  AAA    - s/p (9.28.22) - AAA Resection and Placement of 32mm Interposition Graft  PAF with RVR    - CHADsVASc - 5 Points - 7.2% Stroke Risk per Year     - s/p BOB Ligation (9.28.22) with Endostapler  Acute on Chronic Systolic HF/EF 45%  Hx of Hypothyroidism  HTN  HLD  No Hx of GIB    Plan:   Continue IV Amiodarone - ReBolus IV Amiodarone and Start Drip per Protocol  Continue BB - Increase to Metoprolol Tartrate 25mg PO TID   No ASA Secondary to Allergy  Aggressive Mobilization of PT and Q1HR IS  Start IV Lasix 40mg IVP Q12HRs  NPO after MN  Consider MING/DCCV if PT is not Back in SR  No Anticoagulation secondary to BOB Ligation at this time; Defer to OP Cardiologist  Labs in AM: CBC, CMP and Mg    Jovanni Liriano, ANP  Cardiology  [facility]  10/03/2022

## 2022-10-03 NOTE — PROGRESS NOTES
CT SURGERY PROGRESS NOTE  Sydney Bacon  76 y.o.  1945    Patients Procedure: Procedure(s) (LRB):  REPLACEMENT, AORTIC VALVE (N/A)  REPAIR, AORTA, ASCENDING (N/A)    Subjective  Interval History: Patient back in a fib RVR with HR in 120-40 this AM after patient IV amio infiltration yesterday requiring intradermal hyaluronidase. Currently no other complaints, disappointed that she can't go home today. Cardiology managing amiodarone. Hemodynamically stable.    Review of Systems   Constitutional: Negative.    Respiratory:  Negative for cough, sputum production and shortness of breath.    Cardiovascular:  Negative for chest pain, palpitations, claudication and leg swelling.   Gastrointestinal:  Negative for abdominal pain, nausea and vomiting.   Genitourinary: Negative.    Skin: Negative.         Incision C/D/I     Medication List  Infusions   amiodarone in dextrose 5% 0.5 mg/min (10/02/22 7157)     Scheduled   docusate sodium  100 mg Oral BID    enoxaparin  40 mg Subcutaneous Daily    famotidine (PF)  20 mg Intravenous Daily    folic acid  1 mg Oral Daily    loperamide  4 mg Oral Once    metoprolol tartrate  12.5 mg Oral BID    nitrofurantoin  100 mg Oral Q12H    sucralfate  1 g Oral QID (AC & HS)       Objective:  Recent Vitals:  Temp:  [97.6 °F (36.4 °C)-99.7 °F (37.6 °C)] 98.4 °F (36.9 °C)  Pulse:  [] 81  Resp:  [14-25] 17  SpO2:  [76 %-96 %] 93 %  BP: ()/() 126/53    Physical Exam  Vitals and nursing note reviewed.   Constitutional:       General: She is awake. She is not in acute distress.     Appearance: Normal appearance. She is not toxic-appearing or diaphoretic.   HENT:      Head: Normocephalic and atraumatic.   Eyes:      Extraocular Movements: Extraocular movements intact.      Pupils: Pupils are equal, round, and reactive to light.   Cardiovascular:      Rate and Rhythm: Normal rate and regular rhythm.      Pulses: Normal pulses.           Radial pulses are 2+ on the right side and  2+ on the left side.        Dorsalis pedis pulses are 2+ on the right side and 2+ on the left side.      Heart sounds: Normal heart sounds.   Pulmonary:      Effort: Pulmonary effort is normal.      Breath sounds: Normal breath sounds.   Musculoskeletal:      Right lower leg: No edema.      Left lower leg: No edema.   Skin:     General: Skin is warm and dry.      Comments: INCISION C/D/I   Neurological:      General: No focal deficit present.      Mental Status: She is alert and oriented to person, place, and time.   Psychiatric:         Mood and Affect: Mood and affect normal.        I/O last 24 hrs:  Intake/Output - Last 3 Shifts         10/01 0700  10/02 0659 10/02 0700  10/03 0659 10/03 0700  10/04 0659    P.O. 360 1150     I.V. (mL/kg) 275 (4.1) 539 (8.3)     Total Intake(mL/kg) 635 (9.6) 1689 (25.9)     Urine (mL/kg/hr) 870 (0.5) 2350 (1.5)     Stool 0      Chest Tube       Total Output 870 2350     Net -235 -661            Stool Occurrence 0 x              Labs  BMP:   Recent Labs   Lab 10/03/22  0131      K 3.3*   CO2 26   BUN 10.8   CREATININE 0.66   CALCIUM 8.6   MG 2.00       CBC:   Recent Labs   Lab 10/03/22  0131   WBC 10.8   RBC 4.95   HGB 15.1   HCT 45.3      MCV 91.5   MCH 30.5   MCHC 33.3       All pertinent labs from the last 24 hours have been reviewed.      Imaging:   CXR: X-Ray Chest AP Portable    Result Date: 9/29/2022  Mild bibasilar atelectasis with possible small pleural effusions. Electronically signed by: Ivan Chan Date:    09/29/2022 Time:    07:20   I have reviewed all pertinent imaging results/findings within the past 24 hours.        ASSESSMENT/PLAN:  - Transfer to telemetry  - cardiology managing a fib rvr  - IS/OOB/Ambulate  - PT/OT  - dc pending    Case and plan of care discussed with MD Eliseo Lentz PA-C

## 2022-10-04 LAB
ALBUMIN SERPL-MCNC: 2.6 GM/DL (ref 3.4–4.8)
ALBUMIN/GLOB SERPL: 1 RATIO (ref 1.1–2)
ALP SERPL-CCNC: 137 UNIT/L (ref 40–150)
ALT SERPL-CCNC: 34 UNIT/L (ref 0–55)
AST SERPL-CCNC: 40 UNIT/L (ref 5–34)
BASOPHILS # BLD AUTO: 0.03 X10(3)/MCL (ref 0–0.2)
BASOPHILS NFR BLD AUTO: 0.3 %
BILIRUBIN DIRECT+TOT PNL SERPL-MCNC: 0.5 MG/DL
BUN SERPL-MCNC: 8.4 MG/DL (ref 9.8–20.1)
CALCIUM SERPL-MCNC: 8.2 MG/DL (ref 8.4–10.2)
CHLORIDE SERPL-SCNC: 104 MMOL/L (ref 98–107)
CO2 SERPL-SCNC: 26 MMOL/L (ref 23–31)
CREAT SERPL-MCNC: 0.57 MG/DL (ref 0.55–1.02)
EOSINOPHIL # BLD AUTO: 0.1 X10(3)/MCL (ref 0–0.9)
EOSINOPHIL NFR BLD AUTO: 1.1 %
ERYTHROCYTE [DISTWIDTH] IN BLOOD BY AUTOMATED COUNT: 14 % (ref 11.5–17)
GFR SERPLBLD CREATININE-BSD FMLA CKD-EPI: >60 MLS/MIN/1.73/M2
GLOBULIN SER-MCNC: 2.6 GM/DL (ref 2.4–3.5)
GLUCOSE SERPL-MCNC: 96 MG/DL (ref 82–115)
HCT VFR BLD AUTO: 39.7 % (ref 37–47)
HGB BLD-MCNC: 13 GM/DL (ref 12–16)
IMM GRANULOCYTES # BLD AUTO: 0.04 X10(3)/MCL (ref 0–0.04)
IMM GRANULOCYTES NFR BLD AUTO: 0.4 %
LYMPHOCYTES # BLD AUTO: 1.54 X10(3)/MCL (ref 0.6–4.6)
LYMPHOCYTES NFR BLD AUTO: 16.2 %
MAGNESIUM SERPL-MCNC: 1.8 MG/DL (ref 1.6–2.6)
MCH RBC QN AUTO: 30.4 PG (ref 27–31)
MCHC RBC AUTO-ENTMCNC: 32.7 MG/DL (ref 33–36)
MCV RBC AUTO: 93 FL (ref 80–94)
MONOCYTES # BLD AUTO: 1.4 X10(3)/MCL (ref 0.1–1.3)
MONOCYTES NFR BLD AUTO: 14.7 %
NEUTROPHILS # BLD AUTO: 6.4 X10(3)/MCL (ref 2.1–9.2)
NEUTROPHILS NFR BLD AUTO: 67.3 %
NRBC BLD AUTO-RTO: 0 %
PLATELET # BLD AUTO: 191 X10(3)/MCL (ref 130–400)
PMV BLD AUTO: 10.3 FL (ref 7.4–10.4)
POTASSIUM SERPL-SCNC: 3.4 MMOL/L (ref 3.5–5.1)
PROT SERPL-MCNC: 5.2 GM/DL (ref 5.8–7.6)
RBC # BLD AUTO: 4.27 X10(6)/MCL (ref 4.2–5.4)
SODIUM SERPL-SCNC: 138 MMOL/L (ref 136–145)
WBC # SPEC AUTO: 9.5 X10(3)/MCL (ref 4.5–11.5)

## 2022-10-04 PROCEDURE — 27000221 HC OXYGEN, UP TO 24 HOURS

## 2022-10-04 PROCEDURE — 25000003 PHARM REV CODE 250: Performed by: NURSE PRACTITIONER

## 2022-10-04 PROCEDURE — 93005 ELECTROCARDIOGRAM TRACING: CPT

## 2022-10-04 PROCEDURE — 94799 UNLISTED PULMONARY SVC/PX: CPT

## 2022-10-04 PROCEDURE — 20000000 HC ICU ROOM

## 2022-10-04 PROCEDURE — 99024 PR POST-OP FOLLOW-UP VISIT: ICD-10-PCS | Mod: POP,,, | Performed by: PHYSICIAN ASSISTANT

## 2022-10-04 PROCEDURE — 83735 ASSAY OF MAGNESIUM: CPT | Performed by: NURSE PRACTITIONER

## 2022-10-04 PROCEDURE — 63600175 PHARM REV CODE 636 W HCPCS: Performed by: NURSE PRACTITIONER

## 2022-10-04 PROCEDURE — 99024 POSTOP FOLLOW-UP VISIT: CPT | Mod: POP,,, | Performed by: PHYSICIAN ASSISTANT

## 2022-10-04 PROCEDURE — 94761 N-INVAS EAR/PLS OXIMETRY MLT: CPT

## 2022-10-04 PROCEDURE — 93010 ELECTROCARDIOGRAM REPORT: CPT | Mod: ,,, | Performed by: INTERNAL MEDICINE

## 2022-10-04 PROCEDURE — 25000003 PHARM REV CODE 250: Performed by: SPECIALIST

## 2022-10-04 PROCEDURE — 99900035 HC TECH TIME PER 15 MIN (STAT)

## 2022-10-04 PROCEDURE — 36415 COLL VENOUS BLD VENIPUNCTURE: CPT | Performed by: NURSE PRACTITIONER

## 2022-10-04 PROCEDURE — 93010 EKG 12-LEAD: ICD-10-PCS | Mod: ,,, | Performed by: INTERNAL MEDICINE

## 2022-10-04 PROCEDURE — 63600175 PHARM REV CODE 636 W HCPCS

## 2022-10-04 PROCEDURE — 80053 COMPREHEN METABOLIC PANEL: CPT | Performed by: NURSE PRACTITIONER

## 2022-10-04 PROCEDURE — 25000003 PHARM REV CODE 250

## 2022-10-04 PROCEDURE — 85025 COMPLETE CBC W/AUTO DIFF WBC: CPT | Performed by: NURSE PRACTITIONER

## 2022-10-04 RX ORDER — METOPROLOL TARTRATE 50 MG/1
50 TABLET ORAL 3 TIMES DAILY
Status: DISPENSED | OUTPATIENT
Start: 2022-10-04 | End: 2022-10-08

## 2022-10-04 RX ADMIN — AMIODARONE HYDROCHLORIDE 0.5 MG/MIN: 1.8 INJECTION, SOLUTION INTRAVENOUS at 07:10

## 2022-10-04 RX ADMIN — FOLIC ACID 1 MG: 1 TABLET ORAL at 08:10

## 2022-10-04 RX ADMIN — METOPROLOL TARTRATE 25 MG: 25 TABLET, FILM COATED ORAL at 02:10

## 2022-10-04 RX ADMIN — AMIODARONE HYDROCHLORIDE 0.5 MG/MIN: 1.8 INJECTION, SOLUTION INTRAVENOUS at 05:10

## 2022-10-04 RX ADMIN — HYDROCODONE BITARTRATE AND ACETAMINOPHEN 1 TABLET: 5; 325 TABLET ORAL at 10:10

## 2022-10-04 RX ADMIN — DOCUSATE SODIUM 100 MG: 100 CAPSULE, LIQUID FILLED ORAL at 08:10

## 2022-10-04 RX ADMIN — NITROFURANTOIN MACROCRYSTALS 100 MG: 50 CAPSULE ORAL at 08:10

## 2022-10-04 RX ADMIN — SUCRALFATE 1 G: 1 TABLET ORAL at 06:10

## 2022-10-04 RX ADMIN — SUCRALFATE 1 G: 1 TABLET ORAL at 04:10

## 2022-10-04 RX ADMIN — METOPROLOL TARTRATE 50 MG: 50 TABLET, FILM COATED ORAL at 08:10

## 2022-10-04 RX ADMIN — FUROSEMIDE 40 MG: 10 INJECTION, SOLUTION INTRAMUSCULAR; INTRAVENOUS at 04:10

## 2022-10-04 RX ADMIN — ONDANSETRON 8 MG: 2 INJECTION INTRAMUSCULAR; INTRAVENOUS at 09:10

## 2022-10-04 RX ADMIN — SUCRALFATE 1 G: 1 TABLET ORAL at 08:10

## 2022-10-04 RX ADMIN — FAMOTIDINE 20 MG: 10 INJECTION INTRAVENOUS at 08:10

## 2022-10-04 RX ADMIN — ENOXAPARIN SODIUM 40 MG: 40 INJECTION SUBCUTANEOUS at 04:10

## 2022-10-04 RX ADMIN — HYDROCODONE BITARTRATE AND ACETAMINOPHEN 1 TABLET: 5; 325 TABLET ORAL at 12:10

## 2022-10-04 NOTE — PLAN OF CARE
Problem: Adult Inpatient Plan of Care  Goal: Plan of Care Review  Outcome: Ongoing, Progressing  Goal: Patient-Specific Goal (Individualized)  Outcome: Ongoing, Progressing  Goal: Absence of Hospital-Acquired Illness or Injury  Outcome: Ongoing, Progressing  Goal: Optimal Comfort and Wellbeing  Outcome: Ongoing, Progressing  Goal: Readiness for Transition of Care  Outcome: Ongoing, Progressing     Problem: Infection  Goal: Absence of Infection Signs and Symptoms  Outcome: Ongoing, Progressing     Problem: Skin and Tissue Injury (Mechanical Ventilation, Invasive)  Goal: Absence of Device-Related Skin and Tissue Injury  Outcome: Ongoing, Progressing     Problem: Fall Injury Risk  Goal: Absence of Fall and Fall-Related Injury  Outcome: Ongoing, Progressing     Problem: Skin Injury Risk Increased  Goal: Skin Health and Integrity  Outcome: Ongoing, Progressing

## 2022-10-04 NOTE — PT/OT/SLP PROGRESS
Physical Therapy      Patient Name:  Sydney Bacon   MRN:  [id number]    Patient not seen today secondary to a-fib. Pt going for a cardioversion on tomorrow. PT will follow up tomorrow.

## 2022-10-04 NOTE — PT/OT/SLP PROGRESS
Occupational Therapy      Patient Name:  Sydney Bacon   MRN:  [id number]    Patient not seen today secondary to Nurse/ ANDERS hold. Hold per RN, pt. In A-Fib, follow up as appropriate.     10/4/2022

## 2022-10-04 NOTE — PROGRESS NOTES
"[address]  Cardiology  Progress Note    Patient Name: Sydney Bacon  MRN: [id number]  Admission Date: 9/28/2022  Hospital Length of Stay: 6 days  Code Status: Full Code   Attending Physician: Wing Edmonds IV, MD   Primary Care Physician: Valente Bacon MD  Expected Discharge Date:   Principal Problem:<principal problem not specified>    Subjective:     Brief HPI: Ms. Bacon is a 77 y/o female who is known to [facility], Dr. Mason. She was recently worked up for VHD and found to have a AAA and Mod AR, MI/MR. She was referred to [facility] for evaluation of surgical intervention. On 9.28.22 she underwent a AAA Resection and Placement of 32mm Interposition Graft with AVR 25mm Mosaic Porcine Valve as well as a BOB Ligation with EndoStapler. She tolerated the procedure well and was admitted to ICU post operatively. She became tachycardic and was found to be in A.Fib with RVR. [facility] was consulted for this reason.     Hospital Course:   10.3.22: NAD. Remains in PAF/RVR. IV Infiltration Last PM with Amio. Back on IV Amio at 0.5mg/min. "I am ok." Denies SOB and Palps. + CP/Incisional.   10.4.22: NAD. Denies CP, SOB and Palps. "I am ok." Sitting in Bedside Chair. Remains in PAF with RVR (110s-120s)    PMH: VHD (Mod AR, MI/MR), Chronic Systolic HF, Hypothyroidism, HTN, HLD  PSH: Cataract Surgery, Angiogram   Family History: Father, D, AAA; Mother, D, CVA, CAD  Social History:      Previous Cardiac Diagnostics:   [facility] 9.7.22:  Normal Coronaries  LVEF 45%     ECHO 7.21.22:  The study quality is good.   The left ventricle is moderately enlarged. Normal wall thicknesses. Global left ventricular systolic function is mildly decreased. The left ventricular ejection fraction is 45%. The left ventricle diastolic function is impaired (Grade I) with normal left atrial pressure.  The left atrium is moderately enlarged.  Dilation of the aortic root and ascending aorta is noted. Aortic root diameter is 3.8 cms. " Ascending aorta diameter is 4.1 cms.   Severe (4+) eccentric aortic regurgitation. [id number]. Diastolic flow reversal is seen in the descending aorta.  Mild (1+) mitral regurgitation. Mild (1+) tricuspid regurgitation.   The estimated pulmonary artery systolic pressure is 24 mmHg assuming a right atrial pressure of 3 mmHg.      PET 5.18.18:  This is an abnormal perfusion study.   This scan is suggestive of moderate risk for future cardiovascular events.   Small partially reversible perfusion abnormality of moderate intensity in the apical segment. Small reversible perfusion abnormality of moderate intensity in the anterior septal region. Small fixed perfusion abnormality of mild intensity in the apical inferior segment.   The left ventricular cavity is noted to be markedly enlarged on the stress studies. The stress left ventricular ejection fraction was calculated to be 36% and left ventricular global function is moderately reduced. The rest left ventricular cavity is noted to be moderately enlarged. The rest left ventricular ejection fraction was calculated to be 31% and rest left ventricular global function is moderately reduced.   When compared to the resting ejection fraction (31%), the stress ejection fraction (36%) has increased.   The study quality is good.    Review of Systems   Constitutional: Positive for malaise/fatigue.   Cardiovascular:  Positive for irregular heartbeat. Negative for chest pain.   Respiratory:  Negative for cough and shortness of breath.    All other systems reviewed and are negative.    Objective:     Vital Signs (Most Recent):  Temp: 97.8 °F (36.6 °C) (10/04/22 1200)  Pulse: (!) 126 (10/04/22 1417)  Resp: (!) 22 (10/04/22 1300)  BP: (!) 126/94 (10/04/22 1417)  SpO2: 97 % (10/04/22 1300)   Vital Signs (24h Range):  Temp:  [97.8 °F (36.6 °C)-99.9 °F (37.7 °C)] 97.8 °F (36.6 °C)  Pulse:  [] 126  Resp:  [14-27] 22  SpO2:  [88 %-98 %] 97 %  BP: ()/(65-94) 126/94     Weight:  66.1 kg (145 lb 11.6 oz)  Body mass index is 25.81 kg/m².    SpO2: 97 %  O2 Device (Oxygen Therapy): nasal cannula      Intake/Output Summary (Last 24 hours) at 10/4/2022 1507  Last data filed at 10/4/2022 1200  Gross per 24 hour   Intake 811.84 ml   Output 500 ml   Net 311.84 ml       Lines/Drains/Airways       Peripheral Intravenous Line  Duration                  Peripheral IV - Single Lumen 10/03/22 0200 20 G;2 in Right Forearm 1 day                  Significant Labs:   Recent Results (from the past 72 hour(s))   POCT glucose    Collection Time: 10/01/22  4:48 PM   Result Value Ref Range    POCT Glucose 107 70 - 110 mg/dL   POCT glucose    Collection Time: 10/01/22  8:44 PM   Result Value Ref Range    POCT Glucose 110 70 - 110 mg/dL   POCT glucose    Collection Time: 10/02/22  1:38 AM   Result Value Ref Range    POCT Glucose 96 70 - 110 mg/dL   Comprehensive Metabolic Panel    Collection Time: 10/03/22  1:31 AM   Result Value Ref Range    Sodium Level 140 136 - 145 mmol/L    Potassium Level 3.3 (L) 3.5 - 5.1 mmol/L    Chloride 103 98 - 107 mmol/L    Carbon Dioxide 26 23 - 31 mmol/L    Glucose Level 98 82 - 115 mg/dL    Blood Urea Nitrogen 10.8 9.8 - 20.1 mg/dL    Creatinine 0.66 0.55 - 1.02 mg/dL    Calcium Level Total 8.6 8.4 - 10.2 mg/dL    Protein Total 6.2 5.8 - 7.6 gm/dL    Albumin Level 3.2 (L) 3.4 - 4.8 gm/dL    Globulin 3.0 2.4 - 3.5 gm/dL    Albumin/Globulin Ratio 1.1 1.1 - 2.0 ratio    Bilirubin Total 0.8 <=1.5 mg/dL    Alkaline Phosphatase 114 40 - 150 unit/L    Alanine Aminotransferase 27 0 - 55 unit/L    Aspartate Aminotransferase 31 5 - 34 unit/L    eGFR >60 mls/min/1.73/m2   Magnesium    Collection Time: 10/03/22  1:31 AM   Result Value Ref Range    Magnesium Level 2.00 1.60 - 2.60 mg/dL   CBC with Differential    Collection Time: 10/03/22  1:31 AM   Result Value Ref Range    WBC 10.8 4.5 - 11.5 x10(3)/mcL    RBC 4.95 4.20 - 5.40 x10(6)/mcL    Hgb 15.1 12.0 - 16.0 gm/dL    Hct 45.3 37.0 - 47.0 %     MCV 91.5 80.0 - 94.0 fL    MCH 30.5 27.0 - 31.0 pg    MCHC 33.3 33.0 - 36.0 mg/dL    RDW 13.8 11.5 - 17.0 %    Platelet 192 130 - 400 x10(3)/mcL    MPV 10.2 7.4 - 10.4 fL    Neut % 64.6 %    Lymph % 20.0 %    Mono % 13.6 %    Eos % 1.1 %    Basophil % 0.4 %    Lymph # 2.17 0.6 - 4.6 x10(3)/mcL    Neut # 7.0 2.1 - 9.2 x10(3)/mcL    Mono # 1.47 (H) 0.1 - 1.3 x10(3)/mcL    Eos # 0.12 0 - 0.9 x10(3)/mcL    Baso # 0.04 0 - 0.2 x10(3)/mcL    IG# 0.03 0 - 0.04 x10(3)/mcL    IG% 0.3 %    NRBC% 0.0 %   Comprehensive Metabolic Panel    Collection Time: 10/04/22  1:30 AM   Result Value Ref Range    Sodium Level 138 136 - 145 mmol/L    Potassium Level 3.4 (L) 3.5 - 5.1 mmol/L    Chloride 104 98 - 107 mmol/L    Carbon Dioxide 26 23 - 31 mmol/L    Glucose Level 96 82 - 115 mg/dL    Blood Urea Nitrogen 8.4 (L) 9.8 - 20.1 mg/dL    Creatinine 0.57 0.55 - 1.02 mg/dL    Calcium Level Total 8.2 (L) 8.4 - 10.2 mg/dL    Protein Total 5.2 (L) 5.8 - 7.6 gm/dL    Albumin Level 2.6 (L) 3.4 - 4.8 gm/dL    Globulin 2.6 2.4 - 3.5 gm/dL    Albumin/Globulin Ratio 1.0 (L) 1.1 - 2.0 ratio    Bilirubin Total 0.5 <=1.5 mg/dL    Alkaline Phosphatase 137 40 - 150 unit/L    Alanine Aminotransferase 34 0 - 55 unit/L    Aspartate Aminotransferase 40 (H) 5 - 34 unit/L    eGFR >60 mls/min/1.73/m2   Magnesium    Collection Time: 10/04/22  1:30 AM   Result Value Ref Range    Magnesium Level 1.80 1.60 - 2.60 mg/dL   CBC with Differential    Collection Time: 10/04/22  1:30 AM   Result Value Ref Range    WBC 9.5 4.5 - 11.5 x10(3)/mcL    RBC 4.27 4.20 - 5.40 x10(6)/mcL    Hgb 13.0 12.0 - 16.0 gm/dL    Hct 39.7 37.0 - 47.0 %    MCV 93.0 80.0 - 94.0 fL    MCH 30.4 27.0 - 31.0 pg    MCHC 32.7 (L) 33.0 - 36.0 mg/dL    RDW 14.0 11.5 - 17.0 %    Platelet 191 130 - 400 x10(3)/mcL    MPV 10.3 7.4 - 10.4 fL    Neut % 67.3 %    Lymph % 16.2 %    Mono % 14.7 %    Eos % 1.1 %    Basophil % 0.3 %    Lymph # 1.54 0.6 - 4.6 x10(3)/mcL    Neut # 6.4 2.1 - 9.2 x10(3)/mcL     Mono # 1.40 (H) 0.1 - 1.3 x10(3)/mcL    Eos # 0.10 0 - 0.9 x10(3)/mcL    Baso # 0.03 0 - 0.2 x10(3)/mcL    IG# 0.04 0 - 0.04 x10(3)/mcL    IG% 0.4 %    NRBC% 0.0 %     Telemetry: PAF/RVR    Physical Exam  Constitutional:       Appearance: Normal appearance.   HENT:      Head: Normocephalic.      Mouth/Throat:      Mouth: Mucous membranes are moist.   Eyes:      Extraocular Movements: Extraocular movements intact.   Cardiovascular:      Rate and Rhythm: Tachycardia present. Rhythm irregular.      Pulses: Normal pulses.   Pulmonary:      Effort: Pulmonary effort is normal.      Breath sounds: Decreased breath sounds present.   Abdominal:      Palpations: Abdomen is soft.   Musculoskeletal:         General: Normal range of motion.   Skin:     General: Skin is warm and dry.      Capillary Refill: Capillary refill takes less than 2 seconds.      Comments: Midline Sternotomy Dressing C/D/I. + CTs    Neurological:      General: No focal deficit present.      Mental Status: She is alert and oriented to person, place, and time.   Psychiatric:         Mood and Affect: Mood normal.         Behavior: Behavior normal.     Current Inpatient Medications:    Current Facility-Administered Medications:     0.9%  NaCl infusion (for blood administration), , Intravenous, Q24H PRN, Wing Edmonds IV, MD    albumin human 5% bottle 12.5 g, 12.5 g, Intravenous, PRN, ROSANGELA Alfonso, Stopped at 09/29/22 0258    amiodarone 360 mg/200 mL (1.8 mg/mL) infusion, 0.5 mg/min, Intravenous, Continuous, JUNE Ramirez, Last Rate: 16.7 mL/hr at 10/04/22 1200, 0.5 mg/min at 10/04/22 1200    calcium gluconate 1 g in NS IVPB (premixed), 1 g, Intravenous, PRN, ROSANGELA Alfonso    calcium gluconate 1 g in NS IVPB (premixed), 2 g, Intravenous, PRN, ROSANGELA Alfonso    calcium gluconate 1 g in NS IVPB (premixed), 3 g, Intravenous, PRN, ROSANGELA Alfonso    dextrose 10% bolus 125 mL, 12.5 g, Intravenous, PRN, Wing Edmonds IV, MD    dextrose 10% bolus 250 mL,  25 g, Intravenous, PRN, Wing Edmonds IV, MD    docusate sodium capsule 100 mg, 100 mg, Oral, BID, ROSANGELA Alfonso, 100 mg at 10/04/22 0809    enoxaparin injection 40 mg, 40 mg, Subcutaneous, Daily, ROSANGELA Alfonso, 40 mg at 10/03/22 1629    famotidine (PF) injection 20 mg, 20 mg, Intravenous, Daily, ROSANGELA Alfonso, 20 mg at 10/04/22 0813    folic acid tablet 1 mg, 1 mg, Oral, Daily, ROSANGELA Alfonso, 1 mg at 10/04/22 0809    furosemide injection 40 mg, 40 mg, Intravenous, Q12H, JUNE Ramirez    HYDROcodone-acetaminophen 5-325 mg per tablet 1 tablet, 1 tablet, Oral, Q4H PRN, ROSANGELA Alfonso, 1 tablet at 10/04/22 0039    lactulose 10 gram/15 ml solution 20 g, 20 g, Oral, Q6H PRN, ROSANGELA Alfonso    loperamide capsule 4 mg, 4 mg, Oral, Once, ROSANGELA Alfonso    magnesium sulfate 2g in water 50mL IVPB (premix), 2 g, Intravenous, PRN, ROSANGELA Alfonso    magnesium sulfate 2g in water 50mL IVPB (premix), 4 g, Intravenous, PRN, ROSANGELA Alfonso    metoclopramide HCl injection 5 mg, 5 mg, Intravenous, Q6H PRN, ROSANGELA Alfonso    metoprolol tartrate (LOPRESSOR) tablet 25 mg, 25 mg, Oral, TID, JUNE Ramirez, 25 mg at 10/04/22 1417    morphine injection 2 mg, 2 mg, Intravenous, PRN, ROSANGELA Alfonso    nitrofurantoin capsule 100 mg, 100 mg, Oral, Q12H, Wing Edmonds IV, MD, 100 mg at 10/04/22 0810    ondansetron injection 8 mg, 8 mg, Intravenous, Q8H PRN, ROSANGELA Alfonso    potassium chloride 20 mEq in 100 mL IVPB (FOR CENTRAL LINE ADMINISTRATION ONLY), 20 mEq, Intravenous, PRN, ROSANGELA Alfonso, Last Rate: 50 mL/hr at 10/04/22 1200, Rate Verify at 10/04/22 1200    potassium chloride 20 mEq in 100 mL IVPB (FOR CENTRAL LINE ADMINISTRATION ONLY), 40 mEq, Intravenous, PRN, ROSANGELA Alfonso, Last Rate: 25 mL/hr at 10/04/22 1200, Rate Verify at 10/04/22 1200    sodium chloride 0.9% flush 10 mL, 10 mL, Intravenous, PRN, Ciro Rg MD    sodium chloride 0.9% flush 2 mL, 2 mL, Intravenous, PRN, ROSANGELA Alfonso    sodium phosphate 15  mmol in dextrose 5 % 250 mL IVPB, 15 mmol, Intravenous, PRN, ROSANGELA Alfonso    sodium phosphate 20.01 mmol in dextrose 5 % 250 mL IVPB, 20.01 mmol, Intravenous, PRN, ROSANGELA Alfonso    sodium phosphate 30 mmol in dextrose 5 % 250 mL IVPB, 30 mmol, Intravenous, PRN, ROSANGELA Alfonso    sucralfate tablet 1 g, 1 g, Oral, QID (AC & HS), ROSANGELA Alfonso, 1 g at 10/04/22 0614    VTE Risk Mitigation (From admission, onward)           Ordered     enoxaparin injection 40 mg  Daily         09/28/22 1021     Place sequential compression device  Until discontinued         09/28/22 1021     IP VTE HIGH RISK PATIENT  Once         09/28/22 1010                  Assessment:   VHD (Mod AR, Mild MI/MR)    - s/p (9.28.22) - AVR 25mm Mosaic Porcine Valve  AAA    - s/p (9.28.22) - AAA Resection and Placement of 32mm Interposition Graft  PAF with RVR    - CHADsVASc - 5 Points - 7.2% Stroke Risk per Year     - s/p BOB Ligation (9.28.22) with Endostapler  Acute on Chronic Systolic HF/EF 45%  Hx of Hypothyroidism  HTN  HLD  No Hx of GIB    Plan:   Continue IV Amiodarone per Protocol  Continue BB - Increase to Metoprolol Tartrate 50mg PO TID   No ASA Secondary to Allergy  Aggressive Mobilization of PT and Q1HR IS  Start IV Lasix 40mg IVP Q12HRs  Accurate I&Os and Daily Weights  NPO after MN  Consider MING/DCCV if PT is not Back in SR  No Anticoagulation secondary to BOB Ligation at this time; Defer to OP Cardiologist  If MING show no remnant of MING, will do short term 30 day Eliquis post-cardioversion  PT Remains with Chest Tubes/Unable to Anticoagulate  Labs in AM: CBC, CMP and Mg    Jovanni Liriano, ANP  Cardiology  [facility] - [facility] ICU  10/04/2022

## 2022-10-04 NOTE — PROGRESS NOTES
Back in afib  On amio  Vss  Wounds c/d/I  Afib per card, ambulate, rehab soon    As above  Doing well

## 2022-10-04 NOTE — PROGRESS NOTES
"Inpatient Nutrition Evaluation    Admit Date: 9/28/2022   Total duration of encounter: 6 days    Nutrition Recommendation/Prescription     Continue current diet as tolerated.  Add Boost Plus (provides 360 kcal, 14 g protein per serving) TID.    Nutrition Assessment     Chart Review    Reason Seen: length of stay    Diagnosis:  Severe aortic insufficiency  Ascending aortic aneurysm  Status post aortic valve replacement and ascending aortic aneurysm resection and repair    Relevant Medical History: aortic and mitral insuffiency, HTN, nonischemic cardiomyopathy with EF 45%    Nutrition-Related Medications: docusate, folic acid    Nutrition-Related Labs:  10/4 K 3.4    Diet Order: Diet heart healthy 2000 kcal  Oral Supplement Order: none at this time  Appetite/Oral Intake: poor/25-50% of meals  Factors Affecting Nutritional Intake: constipation  Food/Mandaen/Cultural Preferences: none reported    Skin Integrity: bruised (ecchymotic), drain/device(s), incision  Wound(s):   incision noted    Comments    10/4/22: Pt with decreased appetite at this time. Ok for ONS, will add chocolate flavor. No wt changes PTA. #.    Anthropometrics    Height: 5' 3" (160 cm) Height Method: Stated  Last Weight: 66.1 kg (145 lb 11.6 oz) (10/04/22 0600) Weight Method: Standard Scale  BMI (Calculated): 25.8  BMI Classification: overweight (BMI 25-29.9)     Ideal Body Weight (IBW), Female: 115 lb     % Ideal Body Weight, Female (lb): 122.3 %                             Usual Weight Provided By: patient    Wt Readings from Last 5 Encounters:   10/04/22 66.1 kg (145 lb 11.6 oz)   09/13/22 64.4 kg (142 lb)   08/23/22 64.9 kg (143 lb)     Weight Change(s) Since Admission:  Admit Weight: 64.4 kg (142 lb) (09/23/22 1201)    Patient Education    Not applicable.    Monitoring & Evaluation     Dietitian will monitor energy intake.  Nutrition Risk/Follow-Up: low (follow-up in 5-7 days)  Patients assigned 'low nutrition risk' status do not qualify " for a full nutritional assessment but will be monitored and re-evaluated in a 5-7 day time period.

## 2022-10-05 ENCOUNTER — ANESTHESIA (OUTPATIENT)
Dept: CARDIOLOGY | Facility: HOSPITAL | Age: 77
DRG: 219 | End: 2022-10-05
Payer: MEDICARE

## 2022-10-05 ENCOUNTER — ANESTHESIA EVENT (OUTPATIENT)
Dept: CARDIOLOGY | Facility: HOSPITAL | Age: 77
DRG: 219 | End: 2022-10-05
Payer: MEDICARE

## 2022-10-05 LAB
ALBUMIN SERPL-MCNC: 2.7 GM/DL (ref 3.4–4.8)
ALBUMIN/GLOB SERPL: 0.9 RATIO (ref 1.1–2)
ALP SERPL-CCNC: 167 UNIT/L (ref 40–150)
ALT SERPL-CCNC: 47 UNIT/L (ref 0–55)
AST SERPL-CCNC: 55 UNIT/L (ref 5–34)
BASOPHILS # BLD AUTO: 0.07 X10(3)/MCL (ref 0–0.2)
BASOPHILS NFR BLD AUTO: 0.7 %
BILIRUBIN DIRECT+TOT PNL SERPL-MCNC: 0.5 MG/DL
BUN SERPL-MCNC: 13 MG/DL (ref 9.8–20.1)
CALCIUM SERPL-MCNC: 8.3 MG/DL (ref 8.4–10.2)
CHLORIDE SERPL-SCNC: 102 MMOL/L (ref 98–107)
CO2 SERPL-SCNC: 27 MMOL/L (ref 23–31)
CREAT SERPL-MCNC: 0.66 MG/DL (ref 0.55–1.02)
EOSINOPHIL # BLD AUTO: 0.12 X10(3)/MCL (ref 0–0.9)
EOSINOPHIL NFR BLD AUTO: 1.1 %
ERYTHROCYTE [DISTWIDTH] IN BLOOD BY AUTOMATED COUNT: 13.7 % (ref 11.5–17)
GFR SERPLBLD CREATININE-BSD FMLA CKD-EPI: >60 MLS/MIN/1.73/M2
GLOBULIN SER-MCNC: 2.9 GM/DL (ref 2.4–3.5)
GLUCOSE SERPL-MCNC: 91 MG/DL (ref 82–115)
HCT VFR BLD AUTO: 41.2 % (ref 37–47)
HGB BLD-MCNC: 14 GM/DL (ref 12–16)
IMM GRANULOCYTES # BLD AUTO: 0.11 X10(3)/MCL (ref 0–0.04)
IMM GRANULOCYTES NFR BLD AUTO: 1.1 %
LYMPHOCYTES # BLD AUTO: 2.06 X10(3)/MCL (ref 0.6–4.6)
LYMPHOCYTES NFR BLD AUTO: 19.7 %
MAGNESIUM SERPL-MCNC: 1.9 MG/DL (ref 1.6–2.6)
MCH RBC QN AUTO: 31 PG (ref 27–31)
MCHC RBC AUTO-ENTMCNC: 34 MG/DL (ref 33–36)
MCV RBC AUTO: 91.4 FL (ref 80–94)
MONOCYTES # BLD AUTO: 1.36 X10(3)/MCL (ref 0.1–1.3)
MONOCYTES NFR BLD AUTO: 13 %
NEUTROPHILS # BLD AUTO: 6.8 X10(3)/MCL (ref 2.1–9.2)
NEUTROPHILS NFR BLD AUTO: 64.4 %
NRBC BLD AUTO-RTO: 0 %
PLATELET # BLD AUTO: 199 X10(3)/MCL (ref 130–400)
PMV BLD AUTO: 10 FL (ref 7.4–10.4)
POTASSIUM SERPL-SCNC: 3.6 MMOL/L (ref 3.5–5.1)
PROT SERPL-MCNC: 5.6 GM/DL (ref 5.8–7.6)
RBC # BLD AUTO: 4.51 X10(6)/MCL (ref 4.2–5.4)
SODIUM SERPL-SCNC: 138 MMOL/L (ref 136–145)
WBC # SPEC AUTO: 10.5 X10(3)/MCL (ref 4.5–11.5)

## 2022-10-05 PROCEDURE — C1751 CATH, INF, PER/CENT/MIDLINE: HCPCS

## 2022-10-05 PROCEDURE — 25000003 PHARM REV CODE 250: Performed by: NURSE PRACTITIONER

## 2022-10-05 PROCEDURE — 36415 COLL VENOUS BLD VENIPUNCTURE: CPT | Performed by: NURSE PRACTITIONER

## 2022-10-05 PROCEDURE — 85025 COMPLETE CBC W/AUTO DIFF WBC: CPT | Performed by: NURSE PRACTITIONER

## 2022-10-05 PROCEDURE — 63600175 PHARM REV CODE 636 W HCPCS: Performed by: NURSE ANESTHETIST, CERTIFIED REGISTERED

## 2022-10-05 PROCEDURE — A4216 STERILE WATER/SALINE, 10 ML: HCPCS | Performed by: NURSE PRACTITIONER

## 2022-10-05 PROCEDURE — 63600175 PHARM REV CODE 636 W HCPCS: Performed by: NURSE PRACTITIONER

## 2022-10-05 PROCEDURE — 80053 COMPREHEN METABOLIC PANEL: CPT | Performed by: NURSE PRACTITIONER

## 2022-10-05 PROCEDURE — 63600175 PHARM REV CODE 636 W HCPCS

## 2022-10-05 PROCEDURE — 83735 ASSAY OF MAGNESIUM: CPT | Performed by: NURSE PRACTITIONER

## 2022-10-05 PROCEDURE — 25000003 PHARM REV CODE 250

## 2022-10-05 PROCEDURE — 20000000 HC ICU ROOM

## 2022-10-05 PROCEDURE — 25000003 PHARM REV CODE 250: Performed by: SPECIALIST

## 2022-10-05 PROCEDURE — 25000003 PHARM REV CODE 250: Performed by: NURSE ANESTHETIST, CERTIFIED REGISTERED

## 2022-10-05 RX ORDER — SODIUM CHLORIDE 0.9 % (FLUSH) 0.9 %
10 SYRINGE (ML) INJECTION
Status: DISCONTINUED | OUTPATIENT
Start: 2022-10-05 | End: 2022-10-11

## 2022-10-05 RX ORDER — SODIUM CHLORIDE 0.9 % (FLUSH) 0.9 %
10 SYRINGE (ML) INJECTION EVERY 6 HOURS
Status: DISCONTINUED | OUTPATIENT
Start: 2022-10-05 | End: 2022-10-11

## 2022-10-05 RX ORDER — PROPOFOL 10 MG/ML
VIAL (ML) INTRAVENOUS
Status: DISCONTINUED | OUTPATIENT
Start: 2022-10-05 | End: 2022-10-05

## 2022-10-05 RX ADMIN — FUROSEMIDE 40 MG: 10 INJECTION, SOLUTION INTRAMUSCULAR; INTRAVENOUS at 04:10

## 2022-10-05 RX ADMIN — PROPOFOL 30 MG: 10 INJECTION, EMULSION INTRAVENOUS at 05:10

## 2022-10-05 RX ADMIN — DOCUSATE SODIUM 100 MG: 100 CAPSULE, LIQUID FILLED ORAL at 08:10

## 2022-10-05 RX ADMIN — METOPROLOL TARTRATE 50 MG: 50 TABLET, FILM COATED ORAL at 04:10

## 2022-10-05 RX ADMIN — SUCRALFATE 1 G: 1 TABLET ORAL at 04:10

## 2022-10-05 RX ADMIN — FOLIC ACID 1 MG: 1 TABLET ORAL at 08:10

## 2022-10-05 RX ADMIN — AMIODARONE HYDROCHLORIDE 0.5 MG/MIN: 1.8 INJECTION, SOLUTION INTRAVENOUS at 09:10

## 2022-10-05 RX ADMIN — SODIUM CHLORIDE, PRESERVATIVE FREE 10 ML: 5 INJECTION INTRAVENOUS at 06:10

## 2022-10-05 RX ADMIN — METOPROLOL TARTRATE 50 MG: 50 TABLET, FILM COATED ORAL at 08:10

## 2022-10-05 RX ADMIN — FAMOTIDINE 20 MG: 10 INJECTION INTRAVENOUS at 08:10

## 2022-10-05 RX ADMIN — SUCRALFATE 1 G: 1 TABLET ORAL at 08:10

## 2022-10-05 RX ADMIN — NITROFURANTOIN MACROCRYSTALS 100 MG: 50 CAPSULE ORAL at 08:10

## 2022-10-05 RX ADMIN — ENOXAPARIN SODIUM 40 MG: 40 INJECTION SUBCUTANEOUS at 04:10

## 2022-10-05 RX ADMIN — HYALURONIDASE (HUMAN RECOMBINANT) 150 UNITS: 150 INJECTION, SOLUTION SUBCUTANEOUS at 06:10

## 2022-10-05 RX ADMIN — SODIUM CHLORIDE, SODIUM GLUCONATE, SODIUM ACETATE, POTASSIUM CHLORIDE AND MAGNESIUM CHLORIDE: 526; 502; 368; 37; 30 INJECTION, SOLUTION INTRAVENOUS at 05:10

## 2022-10-05 NOTE — PT/OT/SLP PROGRESS
Physical Therapy      Patient Name:  Sydney Bacon   MRN:  [id number]    Patient not seen secondary to awaiting cardioversion. PT will continue to follow.

## 2022-10-05 NOTE — NURSING
Spoke with NP Yun Rivera at this time and notified of infiltration of amiodarone into pt's left forearm.  MD would like to order hylauronidase and a cold compress to forearm at this time. Orders readback and confirmed. Pt has no complaints at this time.  Will continue to monitor.

## 2022-10-05 NOTE — PROGRESS NOTES
"[facility] - [facility]  Cardiology  Progress Note    Patient Name: Sydney Bacon  MRN: [id number]  Admission Date: 9/28/2022  Hospital Length of Stay: 7 days  Code Status: Full Code   Attending Physician: Wing Edmonds IV, MD   Primary Care Physician: Valente Bacon MD  Expected Discharge Date:   Principal Problem:<principal problem not specified>    Subjective:     Brief HPI: Ms. Bacon is a 75 y/o female who is known to [facility], Dr. Mason. She was recently worked up for VHD and found to have a AAA and Mod AR, MI/MR. She was referred to [facility] for evaluation of surgical intervention. On 9.28.22 she underwent a AAA Resection and Placement of 32mm Interposition Graft with AVR 25mm Mosaic Porcine Valve as well as a BOB Ligation with EndoStapler. She tolerated the procedure well and was admitted to ICU post operatively. She became tachycardic and was found to be in A.Fib with RVR. [facility] was consulted for this reason.     Hospital Course:   10.3.22: NAD. Remains in PAF/RVR. IV Infiltration Last PM with Amio. Back on IV Amio at 0.5mg/min. "I am ok." Denies SOB and Palps. + CP/Incisional.   10.4.22: NAD. Denies CP, SOB and Palps. "I am ok." Sitting in Bedside Chair. Remains in PAF with RVR (110s-120s)  10.5.22: NAD. Denies Cp, SOB, or palps. Sitting up in chair. NPO for scheduled MING/DCCV today. PAF w/ RVR on tele. -1428.4/24 hrs.     PMH: VHD (Mod AR, MI/MR), Chronic Systolic HF, Hypothyroidism, HTN, HLD  PSH: Cataract Surgery, Angiogram   Family History: Father, D, AAA; Mother, D, CVA, CAD  Social History: Denies alcohol, tobacco, or illicit drug use.      Previous Cardiac Diagnostics:   LHC 9.7.22:  Normal Coronaries  LVEF 45%     ECHO 7.21.22:  The study quality is good.   The left ventricle is moderately enlarged. Normal wall thicknesses. Global left ventricular systolic function is mildly decreased. The left ventricular ejection fraction is 45%. The left ventricle diastolic function is impaired " (Grade I) with normal left atrial pressure.  The left atrium is moderately enlarged.  Dilation of the aortic root and ascending aorta is noted. Aortic root diameter is 3.8 cms. Ascending aorta diameter is 4.1 cms.   Severe (4+) eccentric aortic regurgitation. RAE=[id number]. Diastolic flow reversal is seen in the descending aorta.  Mild (1+) mitral regurgitation. Mild (1+) tricuspid regurgitation.   The estimated pulmonary artery systolic pressure is 24 mmHg assuming a right atrial pressure of 3 mmHg.      PET 5.18.18:  This is an abnormal perfusion study.   This scan is suggestive of moderate risk for future cardiovascular events.   Small partially reversible perfusion abnormality of moderate intensity in the apical segment. Small reversible perfusion abnormality of moderate intensity in the anterior septal region. Small fixed perfusion abnormality of mild intensity in the apical inferior segment.   The left ventricular cavity is noted to be markedly enlarged on the stress studies. The stress left ventricular ejection fraction was calculated to be 36% and left ventricular global function is moderately reduced. The rest left ventricular cavity is noted to be moderately enlarged. The rest left ventricular ejection fraction was calculated to be 31% and rest left ventricular global function is moderately reduced.   When compared to the resting ejection fraction (31%), the stress ejection fraction (36%) has increased.   The study quality is good.    Review of Systems   Constitutional: Positive for malaise/fatigue.   Cardiovascular:  Positive for irregular heartbeat. Negative for chest pain and palpitations.   Respiratory:  Negative for cough and shortness of breath.    All other systems reviewed and are negative.    Objective:     Vital Signs (Most Recent):  Temp: 98.1 °F (36.7 °C) (10/05/22 0800)  Pulse: 102 (10/05/22 0900)  Resp: (!) 21 (10/05/22 0900)  BP: 100/60 (10/05/22 0900)  SpO2: (!) 94 % (10/05/22 0900)   Vital  Signs (24h Range):  Temp:  [97.5 °F (36.4 °C)-98.2 °F (36.8 °C)] 98.1 °F (36.7 °C)  Pulse:  [] 102  Resp:  [13-24] 21  SpO2:  [88 %-98 %] 94 %  BP: ()/(51-94) 100/60     Weight: 65.5 kg (144 lb 8 oz)  Body mass index is 25.6 kg/m².    SpO2: (!) 94 %  O2 Device (Oxygen Therapy): nasal cannula      Intake/Output Summary (Last 24 hours) at 10/5/2022 1104  Last data filed at 10/5/2022 0800  Gross per 24 hour   Intake 378.13 ml   Output 2250 ml   Net -1871.87 ml       Lines/Drains/Airways       Peripheral Intravenous Line  Duration                  Midline Catheter Insertion/Assessment  - Single Lumen 10/05/22 0839 Right basilic vein (medial side of arm) other (see comments) <1 day         Peripheral IV - Single Lumen 10/05/22 0632 22 G Anterior;Left Wrist <1 day                  Significant Labs:   Recent Results (from the past 72 hour(s))   Comprehensive Metabolic Panel    Collection Time: 10/03/22  1:31 AM   Result Value Ref Range    Sodium Level 140 136 - 145 mmol/L    Potassium Level 3.3 (L) 3.5 - 5.1 mmol/L    Chloride 103 98 - 107 mmol/L    Carbon Dioxide 26 23 - 31 mmol/L    Glucose Level 98 82 - 115 mg/dL    Blood Urea Nitrogen 10.8 9.8 - 20.1 mg/dL    Creatinine 0.66 0.55 - 1.02 mg/dL    Calcium Level Total 8.6 8.4 - 10.2 mg/dL    Protein Total 6.2 5.8 - 7.6 gm/dL    Albumin Level 3.2 (L) 3.4 - 4.8 gm/dL    Globulin 3.0 2.4 - 3.5 gm/dL    Albumin/Globulin Ratio 1.1 1.1 - 2.0 ratio    Bilirubin Total 0.8 <=1.5 mg/dL    Alkaline Phosphatase 114 40 - 150 unit/L    Alanine Aminotransferase 27 0 - 55 unit/L    Aspartate Aminotransferase 31 5 - 34 unit/L    eGFR >60 mls/min/1.73/m2   Magnesium    Collection Time: 10/03/22  1:31 AM   Result Value Ref Range    Magnesium Level 2.00 1.60 - 2.60 mg/dL   CBC with Differential    Collection Time: 10/03/22  1:31 AM   Result Value Ref Range    WBC 10.8 4.5 - 11.5 x10(3)/mcL    RBC 4.95 4.20 - 5.40 x10(6)/mcL    Hgb 15.1 12.0 - 16.0 gm/dL    Hct 45.3 37.0 - 47.0 %     MCV 91.5 80.0 - 94.0 fL    MCH 30.5 27.0 - 31.0 pg    MCHC 33.3 33.0 - 36.0 mg/dL    RDW 13.8 11.5 - 17.0 %    Platelet 192 130 - 400 x10(3)/mcL    MPV 10.2 7.4 - 10.4 fL    Neut % 64.6 %    Lymph % 20.0 %    Mono % 13.6 %    Eos % 1.1 %    Basophil % 0.4 %    Lymph # 2.17 0.6 - 4.6 x10(3)/mcL    Neut # 7.0 2.1 - 9.2 x10(3)/mcL    Mono # 1.47 (H) 0.1 - 1.3 x10(3)/mcL    Eos # 0.12 0 - 0.9 x10(3)/mcL    Baso # 0.04 0 - 0.2 x10(3)/mcL    IG# 0.03 0 - 0.04 x10(3)/mcL    IG% 0.3 %    NRBC% 0.0 %   Comprehensive Metabolic Panel    Collection Time: 10/04/22  1:30 AM   Result Value Ref Range    Sodium Level 138 136 - 145 mmol/L    Potassium Level 3.4 (L) 3.5 - 5.1 mmol/L    Chloride 104 98 - 107 mmol/L    Carbon Dioxide 26 23 - 31 mmol/L    Glucose Level 96 82 - 115 mg/dL    Blood Urea Nitrogen 8.4 (L) 9.8 - 20.1 mg/dL    Creatinine 0.57 0.55 - 1.02 mg/dL    Calcium Level Total 8.2 (L) 8.4 - 10.2 mg/dL    Protein Total 5.2 (L) 5.8 - 7.6 gm/dL    Albumin Level 2.6 (L) 3.4 - 4.8 gm/dL    Globulin 2.6 2.4 - 3.5 gm/dL    Albumin/Globulin Ratio 1.0 (L) 1.1 - 2.0 ratio    Bilirubin Total 0.5 <=1.5 mg/dL    Alkaline Phosphatase 137 40 - 150 unit/L    Alanine Aminotransferase 34 0 - 55 unit/L    Aspartate Aminotransferase 40 (H) 5 - 34 unit/L    eGFR >60 mls/min/1.73/m2   Magnesium    Collection Time: 10/04/22  1:30 AM   Result Value Ref Range    Magnesium Level 1.80 1.60 - 2.60 mg/dL   CBC with Differential    Collection Time: 10/04/22  1:30 AM   Result Value Ref Range    WBC 9.5 4.5 - 11.5 x10(3)/mcL    RBC 4.27 4.20 - 5.40 x10(6)/mcL    Hgb 13.0 12.0 - 16.0 gm/dL    Hct 39.7 37.0 - 47.0 %    MCV 93.0 80.0 - 94.0 fL    MCH 30.4 27.0 - 31.0 pg    MCHC 32.7 (L) 33.0 - 36.0 mg/dL    RDW 14.0 11.5 - 17.0 %    Platelet 191 130 - 400 x10(3)/mcL    MPV 10.3 7.4 - 10.4 fL    Neut % 67.3 %    Lymph % 16.2 %    Mono % 14.7 %    Eos % 1.1 %    Basophil % 0.3 %    Lymph # 1.54 0.6 - 4.6 x10(3)/mcL    Neut # 6.4 2.1 - 9.2 x10(3)/mcL     Mono # 1.40 (H) 0.1 - 1.3 x10(3)/mcL    Eos # 0.10 0 - 0.9 x10(3)/mcL    Baso # 0.03 0 - 0.2 x10(3)/mcL    IG# 0.04 0 - 0.04 x10(3)/mcL    IG% 0.4 %    NRBC% 0.0 %   Magnesium    Collection Time: 10/05/22  1:02 AM   Result Value Ref Range    Magnesium Level 1.90 1.60 - 2.60 mg/dL   Comprehensive Metabolic Panel    Collection Time: 10/05/22  1:02 AM   Result Value Ref Range    Sodium Level 138 136 - 145 mmol/L    Potassium Level 3.6 3.5 - 5.1 mmol/L    Chloride 102 98 - 107 mmol/L    Carbon Dioxide 27 23 - 31 mmol/L    Glucose Level 91 82 - 115 mg/dL    Blood Urea Nitrogen 13.0 9.8 - 20.1 mg/dL    Creatinine 0.66 0.55 - 1.02 mg/dL    Calcium Level Total 8.3 (L) 8.4 - 10.2 mg/dL    Protein Total 5.6 (L) 5.8 - 7.6 gm/dL    Albumin Level 2.7 (L) 3.4 - 4.8 gm/dL    Globulin 2.9 2.4 - 3.5 gm/dL    Albumin/Globulin Ratio 0.9 (L) 1.1 - 2.0 ratio    Bilirubin Total 0.5 <=1.5 mg/dL    Alkaline Phosphatase 167 (H) 40 - 150 unit/L    Alanine Aminotransferase 47 0 - 55 unit/L    Aspartate Aminotransferase 55 (H) 5 - 34 unit/L    eGFR >60 mls/min/1.73/m2   CBC with Differential    Collection Time: 10/05/22  1:02 AM   Result Value Ref Range    WBC 10.5 4.5 - 11.5 x10(3)/mcL    RBC 4.51 4.20 - 5.40 x10(6)/mcL    Hgb 14.0 12.0 - 16.0 gm/dL    Hct 41.2 37.0 - 47.0 %    MCV 91.4 80.0 - 94.0 fL    MCH 31.0 27.0 - 31.0 pg    MCHC 34.0 33.0 - 36.0 mg/dL    RDW 13.7 11.5 - 17.0 %    Platelet 199 130 - 400 x10(3)/mcL    MPV 10.0 7.4 - 10.4 fL    Neut % 64.4 %    Lymph % 19.7 %    Mono % 13.0 %    Eos % 1.1 %    Basophil % 0.7 %    Lymph # 2.06 0.6 - 4.6 x10(3)/mcL    Neut # 6.8 2.1 - 9.2 x10(3)/mcL    Mono # 1.36 (H) 0.1 - 1.3 x10(3)/mcL    Eos # 0.12 0 - 0.9 x10(3)/mcL    Baso # 0.07 0 - 0.2 x10(3)/mcL    IG# 0.11 (H) 0 - 0.04 x10(3)/mcL    IG% 1.1 %    NRBC% 0.0 %     Telemetry: PAF/RVR    Physical Exam  Constitutional:       Appearance: Normal appearance.   HENT:      Head: Normocephalic.      Mouth/Throat:      Mouth: Mucous membranes  are moist.   Eyes:      Extraocular Movements: Extraocular movements intact.   Cardiovascular:      Rate and Rhythm: Tachycardia present. Rhythm irregular.      Pulses: Normal pulses.   Pulmonary:      Effort: Pulmonary effort is normal.      Breath sounds: Decreased breath sounds present.   Abdominal:      Palpations: Abdomen is soft.   Musculoskeletal:         General: Normal range of motion.   Skin:     General: Skin is warm and dry.      Capillary Refill: Capillary refill takes less than 2 seconds.      Comments: Midline Sternotomy SIA C/D/I   Neurological:      General: No focal deficit present.      Mental Status: She is alert and oriented to person, place, and time.   Psychiatric:         Mood and Affect: Mood normal.         Behavior: Behavior normal.     Current Inpatient Medications:    Current Facility-Administered Medications:     0.9%  NaCl infusion (for blood administration), , Intravenous, Q24H PRN, Wnig Edmonds IV, MD    albumin human 5% bottle 12.5 g, 12.5 g, Intravenous, PRN, ROSANGELA Alfonso, Stopped at 09/29/22 0258    amiodarone 360 mg/200 mL (1.8 mg/mL) infusion, 0.5 mg/min, Intravenous, Continuous, Jovanni Liriano ANP, Last Rate: 16.7 mL/hr at 10/05/22 0927, 0.5 mg/min at 10/05/22 0927    calcium gluconate 1 g in NS IVPB (premixed), 1 g, Intravenous, PRN, ROSANGELA Alfonso    calcium gluconate 1 g in NS IVPB (premixed), 2 g, Intravenous, PRN, ROSANGELA Alfonso    calcium gluconate 1 g in NS IVPB (premixed), 3 g, Intravenous, PRN, ROSANGELA Alfonso    dextrose 10% bolus 125 mL, 12.5 g, Intravenous, PRN, Wing Edmonds IV, MD    dextrose 10% bolus 250 mL, 25 g, Intravenous, PRN, Wing Edmonds IV, MD    docusate sodium capsule 100 mg, 100 mg, Oral, BID, ROSANGELA Alfonso 100 mg at 10/05/22 0822    enoxaparin injection 40 mg, 40 mg, Subcutaneous, Daily, ROSANGELA Alfonso 40 mg at 10/04/22 1656    famotidine (PF) injection 20 mg, 20 mg, Intravenous, Daily, ROSANGELA Alfonso 20 mg at 10/05/22 0822    folic  acid tablet 1 mg, 1 mg, Oral, Daily, ROSANGELA Alfonso, 1 mg at 10/05/22 0822    furosemide injection 40 mg, 40 mg, Intravenous, Q12H, JUNE Ramirez, 40 mg at 10/05/22 0435    HYDROcodone-acetaminophen 5-325 mg per tablet 1 tablet, 1 tablet, Oral, Q4H PRN, ROSANGELA Alfonso, 1 tablet at 10/04/22 2210    lactulose 10 gram/15 ml solution 20 g, 20 g, Oral, Q6H PRN, ROSANGELA Alfonso    loperamide capsule 4 mg, 4 mg, Oral, Once, ROSANGELA Alfonso    magnesium sulfate 2g in water 50mL IVPB (premix), 2 g, Intravenous, PRN, ROSANGELA Alfonso    magnesium sulfate 2g in water 50mL IVPB (premix), 4 g, Intravenous, PRN, ROSANGELA Alfonso    metoclopramide HCl injection 5 mg, 5 mg, Intravenous, Q6H PRN, ROSANGELA Alfonso    metoprolol tartrate (LOPRESSOR) tablet 50 mg, 50 mg, Oral, TID, JUNE Ramirez, 50 mg at 10/05/22 0822    morphine injection 2 mg, 2 mg, Intravenous, PRN, ROSANGELA Alfonso    nitrofurantoin capsule 100 mg, 100 mg, Oral, Q12H, Wing Edmonds IV, MD, 100 mg at 10/05/22 0825    ondansetron injection 8 mg, 8 mg, Intravenous, Q8H PRN, ROSANGELA Alfonso, 8 mg at 10/04/22 2103    potassium chloride 20 mEq in 100 mL IVPB (FOR CENTRAL LINE ADMINISTRATION ONLY), 20 mEq, Intravenous, PRN, ROSANGELA Alfonso, Last Rate: 50 mL/hr at 10/05/22 0800, Rate Verify at 10/05/22 0800    potassium chloride 20 mEq in 100 mL IVPB (FOR CENTRAL LINE ADMINISTRATION ONLY), 40 mEq, Intravenous, PRN, ROSANGELA Alfonso, Last Rate: 25 mL/hr at 10/05/22 0800, Rate Verify at 10/05/22 0800    sodium chloride 0.9% flush 10 mL, 10 mL, Intravenous, PRN, Ciro Rg MD    Flushing PICC Protocol, , , Until Discontinued **AND** sodium chloride 0.9% flush 10 mL, 10 mL, Intravenous, Q6H **AND** sodium chloride 0.9% flush 10 mL, 10 mL, Intravenous, PRN, Yun Rivera NP    sodium chloride 0.9% flush 2 mL, 2 mL, Intravenous, PRN, ROSANGELA Alfonso    sodium phosphate 15 mmol in dextrose 5 % 250 mL IVPB, 15 mmol, Intravenous, PRN, ROSANGELA Alfonso    sodium phosphate  20.01 mmol in dextrose 5 % 250 mL IVPB, 20.01 mmol, Intravenous, PRN, ROSANGELA Alfonso    sodium phosphate 30 mmol in dextrose 5 % 250 mL IVPB, 30 mmol, Intravenous, PRN, ROSANGELA Alfonso    sucralfate tablet 1 g, 1 g, Oral, QID (AC & HS), ROSANGELA Alfonso, 1 g at 10/04/22 2020    VTE Risk Mitigation (From admission, onward)           Ordered     enoxaparin injection 40 mg  Daily         09/28/22 1021     Place sequential compression device  Until discontinued         09/28/22 1021     IP VTE HIGH RISK PATIENT  Once         09/28/22 1010                  Assessment:   VHD (Mod AR, Mild MI/MR)    - s/p (9.28.22) - AVR 25mm Mosaic Porcine Valve  AAA    - s/p (9.28.22) - AAA Resection and Placement of 32mm Interposition Graft  PAF with RVR    - CHADsVASc - 5 Points - 7.2% Stroke Risk per Year     - s/p BOB Ligation (9.28.22) with Endostapler  Acute on Chronic Systolic HF/EF 45%  Hx of Hypothyroidism  HTN  HLD  No Hx of GIB    Plan:   Continue IV Amiodarone per Protocol  Continue BB - Metoprolol Tartrate 50mg PO TID   No ASA Secondary to Allergy  Aggressive Mobilization of PT and Q1HR IS  Continue Lasix 40mg IVP Q12HRs  Accurate I&Os and Daily Weights  Keep NPO for MING/DCCV today.   R/B/A discussed with patient. She agrees to proceed with the procedure.  Consents obtained and placed on the chart.   No Anticoagulation secondary to BOB Ligation at this time; Defer to OP Cardiologist  If MING show no remnant of MING, will do short term 30 day Eliquis post-cardioversion  Labs in AM: CBC, CMP and Mg    LOREN Gore  Cardiology  [facility] - [address] [place] [address]  10/05/2022

## 2022-10-05 NOTE — PROCEDURES
"Sydney Bacon is a 76 y.o. female patient.    Temp: 97.5 °F (36.4 °C) (10/05/22 0430)  Pulse: 69 (10/05/22 0610)  Resp: 20 (10/05/22 0610)  BP: (!) 113/94 (10/05/22 0610)  SpO2: (!) 93 % (10/05/22 0610)  Weight: 65.5 kg (144 lb 8 oz) (10/05/22 0517)  Height: 5' 3" (160 cm) (09/28/22 0602)    PICC  Performed by: Danette Cobian RN  Time out: Immediately prior to procedure a time out was called to verify the correct patient, procedure, equipment, support staff and site/side marked as required  Indications: med administration  Anesthesia: local infiltration  Local anesthetic: lidocaine 1% without epinephrine  Anesthetic Total (mL): 2  Preparation: skin prepped with ChloraPrep  Skin prep agent dried: skin prep agent completely dried prior to procedure  Sterile barriers: all five maximum sterile barriers used - cap, mask, sterile gown, sterile gloves, and large sterile sheet  Hand hygiene: hand hygiene performed prior to central venous catheter insertion  Location details: right basilic  Catheter type: single lumen  Catheter size: 4 Fr  Ultrasound guidance: yes  Vessel Caliber: large and patent, compressibility normal  Needle advanced into vessel with real time Ultrasound guidance.  Guidewire confirmed in vessel.  Sterile sheath used.  Number of attempts: 1  Post-procedure: blood return through all ports, chlorhexidine patch and sterile dressing applied    Assessment: successful placement  Complications: none        Danette cobian rn  10/5/2022    "

## 2022-10-05 NOTE — TRANSFER OF CARE
"Anesthesia Transfer of Care Note    Patient: Anetta Arleen    Procedure(s) Performed: * No procedures listed *    Patient location: ICU    Anesthesia Type: MAC    Transport from OR: Transported from OR on room air with adequate spontaneous ventilation    Post pain: adequate analgesia    Post assessment: no apparent anesthetic complications    Post vital signs: stable    Level of consciousness: awake    Nausea/Vomiting: no nausea/vomiting    Complications: none    Transfer of care protocol was followed      Last vitals:   Visit Vitals  BP 97/83   Pulse 86   Temp 36.9 °C (98.5 °F) (Oral)   Resp 15   Ht 5' 3" (1.6 m)   Wt 65.5 kg (144 lb 8 oz)   LMP  (LMP Unknown)   SpO2 96%   Breastfeeding No   BMI 25.60 kg/m²     "

## 2022-10-05 NOTE — ANESTHESIA PREPROCEDURE EVALUATION
10/05/2022  Sydney Bacon is a 76 y.o., female presents for a MING / poss. ECV.      HPI: Ms. Bacon is a 77 y/o female who is known to [facility], Dr. Mason. She was recently worked up for VHD and found to have a AAA and Mod AR, MI/MR. She was referred to [facility] for evaluation of surgical intervention. On 9.28.22 she underwent a AAA Resection and Placement of 32mm Interposition Graft with AVR 25mm Mosaic Porcine Valve as well as a BOB Ligation with EndoStapler. She tolerated the procedure well and was admitted to ICU post operatively. She became tachycardic and was found to be in A.Fib with RVR. [facility] was consulted for this reason.     ECHO 7.21.22:  The study quality is good.   The left ventricle is moderately enlarged. Normal wall thicknesses. Global left ventricular systolic function is mildly decreased. The left ventricular ejection fraction is 45%. The left ventricle diastolic function is impaired (Grade I) with normal left atrial pressure.  The left atrium is moderately enlarged.  Dilation of the aortic root and ascending aorta is noted. Aortic root diameter is 3.8 cms. Ascending aorta diameter is 4.1 cms.   Severe (4+) eccentric aortic regurgitation. PZV=[id number]. Diastolic flow reversal is seen in the descending aorta.  Mild (1+) mitral regurgitation. Mild (1+) tricuspid regurgitation.   The estimated pulmonary artery systolic pressure is 24 mmHg assuming a right atrial pressure of 3 mmHg.      Pre-op Assessment    I have reviewed the Patient Summary Reports.    I have reviewed the NPO Status.   I have reviewed the Medications.     Review of Systems  No problems w/ previous gen anesthetics    Physical Exam  General: Alert and Oriented    Airway:  Mallampati: III   Mouth Opening: Small, but > 3cm  TM Distance: Normal      Dental:  Intact    Chest/Lungs:  Normal Respiratory Rate    Heart:  Rate: Normal  Rhythm:  Irregularly Irregular        Anesthesia Plan  Type of Anesthesia, risks & benefits discussed:    Anesthesia Type: Gen Natural Airway  Intra-op Monitoring Plan: Standard ASA Monitors  Post Op Pain Control Plan: IV/PO Opioids PRN  Induction:  IV  Airway Plan: Direct  Informed Consent: Informed consent signed with the Patient and all parties understand the risks and agree with anesthesia plan.  All questions answered. Patient consented to blood products? No  ASA Score: 3  Day of Surgery Review of History & Physical: H&P Update referred to the surgeon/provider.  Anesthesia Plan Notes: Nasal cannula vs facemask supplemental oxygenation       Ready For Surgery From Anesthesia Perspective.     .

## 2022-10-05 NOTE — PT/OT/SLP PROGRESS
Occupational Therapy      Patient Name:  Sydney Bacon   MRN:  [id number]    Patient not seen today secondary to Off the floor for procedure/surgery. Cardioversion planned. Follow up as appropriate.    10/5/2022

## 2022-10-06 LAB
ANION GAP SERPL CALC-SCNC: 8 MEQ/L
BASOPHILS # BLD AUTO: 0.04 X10(3)/MCL (ref 0–0.2)
BASOPHILS NFR BLD AUTO: 0.4 %
BSA FOR ECHO PROCEDURE: 1.7 M2
BUN SERPL-MCNC: 18.9 MG/DL (ref 9.8–20.1)
CALCIUM SERPL-MCNC: 8.7 MG/DL (ref 8.4–10.2)
CHLORIDE SERPL-SCNC: 100 MMOL/L (ref 98–107)
CO2 SERPL-SCNC: 30 MMOL/L (ref 23–31)
CREAT SERPL-MCNC: 0.7 MG/DL (ref 0.55–1.02)
CREAT/UREA NIT SERPL: 27
CV ECHO LV RWT: 0.7 CM
ECHO LV POSTERIOR WALL: 1.58 CM (ref 0.6–1.1)
EJECTION FRACTION: 25 %
EOSINOPHIL # BLD AUTO: 0.13 X10(3)/MCL (ref 0–0.9)
EOSINOPHIL NFR BLD AUTO: 1.4 %
ERYTHROCYTE [DISTWIDTH] IN BLOOD BY AUTOMATED COUNT: 13.7 % (ref 11.5–17)
FRACTIONAL SHORTENING: 15 % (ref 28–44)
GFR SERPLBLD CREATININE-BSD FMLA CKD-EPI: >60 MLS/MIN/1.73/M2
GLUCOSE SERPL-MCNC: 104 MG/DL (ref 82–115)
GLUCOSE SERPL-MCNC: 169 MG/DL (ref 70–110)
GLUCOSE SERPL-MCNC: 218 MG/DL (ref 70–110)
HCO3 UR-SCNC: 22.9 MMOL/L (ref 24–28)
HCO3 UR-SCNC: 27.2 MMOL/L (ref 24–28)
HCT VFR BLD AUTO: 40.5 % (ref 37–47)
HCT VFR BLD CALC: 21 %PCV (ref 36–54)
HCT VFR BLD CALC: 27 %PCV (ref 36–54)
HGB BLD-MCNC: 13.4 GM/DL (ref 12–16)
HGB BLD-MCNC: 7 G/DL
HGB BLD-MCNC: 9 G/DL
IMM GRANULOCYTES # BLD AUTO: 0.07 X10(3)/MCL (ref 0–0.04)
IMM GRANULOCYTES NFR BLD AUTO: 0.7 %
INTERVENTRICULAR SEPTUM: 1.37 CM (ref 0.6–1.1)
LEFT INTERNAL DIMENSION IN SYSTOLE: 3.83 CM (ref 2.1–4)
LEFT VENTRICLE DIASTOLIC VOLUME INDEX: 55.89 ML/M2
LEFT VENTRICLE DIASTOLIC VOLUME: 93.9 ML
LEFT VENTRICLE MASS INDEX: 162 G/M2
LEFT VENTRICLE SYSTOLIC VOLUME INDEX: 37.6 ML/M2
LEFT VENTRICLE SYSTOLIC VOLUME: 63.1 ML
LEFT VENTRICULAR INTERNAL DIMENSION IN DIASTOLE: 4.53 CM (ref 3.5–6)
LEFT VENTRICULAR MASS: 271.46 G
LYMPHOCYTES # BLD AUTO: 1.41 X10(3)/MCL (ref 0.6–4.6)
LYMPHOCYTES NFR BLD AUTO: 14.7 %
MAGNESIUM SERPL-MCNC: 1.9 MG/DL (ref 1.6–2.6)
MCH RBC QN AUTO: 30.5 PG (ref 27–31)
MCHC RBC AUTO-ENTMCNC: 33.1 MG/DL (ref 33–36)
MCV RBC AUTO: 92 FL (ref 80–94)
MONOCYTES # BLD AUTO: 1.35 X10(3)/MCL (ref 0.1–1.3)
MONOCYTES NFR BLD AUTO: 14 %
NEUTROPHILS # BLD AUTO: 6.6 X10(3)/MCL (ref 2.1–9.2)
NEUTROPHILS NFR BLD AUTO: 68.8 %
NRBC BLD AUTO-RTO: 0 %
PCO2 BLDA: 43.9 MMHG (ref 35–45)
PCO2 BLDA: 47.4 MMHG (ref 35–45)
PH SMN: 7.32 [PH] (ref 7.35–7.45)
PH SMN: 7.37 [PH] (ref 7.35–7.45)
PLATELET # BLD AUTO: 259 X10(3)/MCL (ref 130–400)
PMV BLD AUTO: 10 FL (ref 7.4–10.4)
PO2 BLDA: 416 MMHG (ref 80–100)
PO2 BLDA: 56 MMHG (ref 40–60)
POC BE: -3 MMOL/L
POC BE: 2 MMOL/L
POC IONIZED CALCIUM: 0.98 MMOL/L (ref 1.06–1.42)
POC IONIZED CALCIUM: 1.01 MMOL/L (ref 1.06–1.42)
POC SATURATED O2: 100 % (ref 95–100)
POC SATURATED O2: 86 % (ref 95–100)
POC TCO2: 24 MMOL/L (ref 24–29)
POC TCO2: 29 MMOL/L (ref 23–27)
POTASSIUM BLD-SCNC: 4.5 MMOL/L (ref 3.5–5.1)
POTASSIUM BLD-SCNC: 4.8 MMOL/L (ref 3.5–5.1)
POTASSIUM SERPL-SCNC: 3.2 MMOL/L (ref 3.5–5.1)
RBC # BLD AUTO: 4.4 X10(6)/MCL (ref 4.2–5.4)
RIGHT VENTRICULAR END-DIASTOLIC DIMENSION: 2.73 CM
SAMPLE: ABNORMAL
SAMPLE: ABNORMAL
SODIUM BLD-SCNC: 136 MMOL/L (ref 136–145)
SODIUM BLD-SCNC: 138 MMOL/L (ref 136–145)
SODIUM SERPL-SCNC: 138 MMOL/L (ref 136–145)
WBC # SPEC AUTO: 9.6 X10(3)/MCL (ref 4.5–11.5)

## 2022-10-06 PROCEDURE — 25000003 PHARM REV CODE 250: Performed by: NURSE PRACTITIONER

## 2022-10-06 PROCEDURE — 20000000 HC ICU ROOM

## 2022-10-06 PROCEDURE — 99024 PR POST-OP FOLLOW-UP VISIT: ICD-10-PCS | Mod: POP,,,

## 2022-10-06 PROCEDURE — 94799 UNLISTED PULMONARY SVC/PX: CPT

## 2022-10-06 PROCEDURE — 97535 SELF CARE MNGMENT TRAINING: CPT

## 2022-10-06 PROCEDURE — 80048 BASIC METABOLIC PNL TOTAL CA: CPT

## 2022-10-06 PROCEDURE — 97110 THERAPEUTIC EXERCISES: CPT

## 2022-10-06 PROCEDURE — 94761 N-INVAS EAR/PLS OXIMETRY MLT: CPT

## 2022-10-06 PROCEDURE — 94760 N-INVAS EAR/PLS OXIMETRY 1: CPT

## 2022-10-06 PROCEDURE — 25000003 PHARM REV CODE 250

## 2022-10-06 PROCEDURE — 97116 GAIT TRAINING THERAPY: CPT | Mod: CQ

## 2022-10-06 PROCEDURE — 99024 POSTOP FOLLOW-UP VISIT: CPT | Mod: POP,,,

## 2022-10-06 PROCEDURE — 63600175 PHARM REV CODE 636 W HCPCS

## 2022-10-06 PROCEDURE — 27000221 HC OXYGEN, UP TO 24 HOURS

## 2022-10-06 PROCEDURE — A4216 STERILE WATER/SALINE, 10 ML: HCPCS | Performed by: NURSE PRACTITIONER

## 2022-10-06 PROCEDURE — 83735 ASSAY OF MAGNESIUM: CPT

## 2022-10-06 PROCEDURE — 85025 COMPLETE CBC W/AUTO DIFF WBC: CPT

## 2022-10-06 PROCEDURE — 63600175 PHARM REV CODE 636 W HCPCS: Performed by: NURSE PRACTITIONER

## 2022-10-06 PROCEDURE — 36415 COLL VENOUS BLD VENIPUNCTURE: CPT

## 2022-10-06 PROCEDURE — 25000003 PHARM REV CODE 250: Performed by: SPECIALIST

## 2022-10-06 RX ORDER — AMIODARONE HYDROCHLORIDE 200 MG/1
200 TABLET ORAL DAILY
Status: DISCONTINUED | OUTPATIENT
Start: 2022-10-12 | End: 2022-10-12 | Stop reason: HOSPADM

## 2022-10-06 RX ORDER — POTASSIUM CHLORIDE 20 MEQ/1
40 TABLET, EXTENDED RELEASE ORAL ONCE
Status: COMPLETED | OUTPATIENT
Start: 2022-10-06 | End: 2022-10-06

## 2022-10-06 RX ORDER — AMIODARONE HYDROCHLORIDE 200 MG/1
400 TABLET ORAL 2 TIMES DAILY
Status: DISPENSED | OUTPATIENT
Start: 2022-10-06 | End: 2022-10-09

## 2022-10-06 RX ORDER — AMIODARONE HYDROCHLORIDE 200 MG/1
200 TABLET ORAL DAILY
Status: COMPLETED | OUTPATIENT
Start: 2022-10-09 | End: 2022-10-11

## 2022-10-06 RX ADMIN — FUROSEMIDE 40 MG: 10 INJECTION, SOLUTION INTRAMUSCULAR; INTRAVENOUS at 03:10

## 2022-10-06 RX ADMIN — SODIUM CHLORIDE, PRESERVATIVE FREE 10 ML: 5 INJECTION INTRAVENOUS at 06:10

## 2022-10-06 RX ADMIN — SUCRALFATE 1 G: 1 TABLET ORAL at 09:10

## 2022-10-06 RX ADMIN — ONDANSETRON 8 MG: 2 INJECTION INTRAMUSCULAR; INTRAVENOUS at 12:10

## 2022-10-06 RX ADMIN — SUCRALFATE 1 G: 1 TABLET ORAL at 04:10

## 2022-10-06 RX ADMIN — ONDANSETRON 8 MG: 2 INJECTION INTRAMUSCULAR; INTRAVENOUS at 11:10

## 2022-10-06 RX ADMIN — METOPROLOL TARTRATE 50 MG: 50 TABLET, FILM COATED ORAL at 03:10

## 2022-10-06 RX ADMIN — FAMOTIDINE 20 MG: 10 INJECTION INTRAVENOUS at 08:10

## 2022-10-06 RX ADMIN — DOCUSATE SODIUM 100 MG: 100 CAPSULE, LIQUID FILLED ORAL at 08:10

## 2022-10-06 RX ADMIN — POTASSIUM CHLORIDE 40 MEQ: 1500 TABLET, EXTENDED RELEASE ORAL at 03:10

## 2022-10-06 RX ADMIN — APIXABAN 5 MG: 5 TABLET, FILM COATED ORAL at 09:10

## 2022-10-06 RX ADMIN — METOPROLOL TARTRATE 50 MG: 50 TABLET, FILM COATED ORAL at 08:10

## 2022-10-06 RX ADMIN — AMIODARONE HYDROCHLORIDE 400 MG: 200 TABLET ORAL at 10:10

## 2022-10-06 RX ADMIN — FOLIC ACID 1 MG: 1 TABLET ORAL at 08:10

## 2022-10-06 RX ADMIN — SUCRALFATE 1 G: 1 TABLET ORAL at 06:10

## 2022-10-06 RX ADMIN — APIXABAN 5 MG: 5 TABLET, FILM COATED ORAL at 10:10

## 2022-10-06 RX ADMIN — SODIUM CHLORIDE, PRESERVATIVE FREE 10 ML: 5 INJECTION INTRAVENOUS at 12:10

## 2022-10-06 RX ADMIN — SUCRALFATE 1 G: 1 TABLET ORAL at 10:10

## 2022-10-06 RX ADMIN — DOCUSATE SODIUM 100 MG: 100 CAPSULE, LIQUID FILLED ORAL at 09:10

## 2022-10-06 RX ADMIN — NITROFURANTOIN MACROCRYSTALS 100 MG: 50 CAPSULE ORAL at 08:10

## 2022-10-06 RX ADMIN — FUROSEMIDE 40 MG: 10 INJECTION, SOLUTION INTRAMUSCULAR; INTRAVENOUS at 04:10

## 2022-10-06 RX ADMIN — NITROFURANTOIN MACROCRYSTALS 100 MG: 50 CAPSULE ORAL at 09:10

## 2022-10-06 NOTE — PROGRESS NOTES
CT SURGERY PROGRESS NOTE  Sydney Bacon  76 y.o.  1945    Patients Procedure: Procedure(s) (LRB):  REPLACEMENT, AORTIC VALVE (N/A)  REPAIR, AORTA, ASCENDING (N/A)    Subjective  Interval History: NAEO. Patient DCCV yesterday, now in NSR. Patient with no complaints. Discussed dc plans, would like rehab. Working with PT/OT today and case management for rehab eval.     Review of Systems   Constitutional: Negative.    Respiratory:  Negative for cough, sputum production and shortness of breath.    Cardiovascular:  Negative for chest pain, palpitations, claudication and leg swelling.   Gastrointestinal:  Negative for abdominal pain, nausea and vomiting.   Genitourinary: Negative.    Skin: Negative.         Incision C/D/I     Medication List  Infusions   amiodarone in dextrose 5% 0.5 mg/min (10/05/22 0927)     Scheduled   docusate sodium  100 mg Oral BID    enoxaparin  40 mg Subcutaneous Daily    famotidine (PF)  20 mg Intravenous Daily    folic acid  1 mg Oral Daily    furosemide (LASIX) injection  40 mg Intravenous Q12H    loperamide  4 mg Oral Once    metoprolol tartrate  50 mg Oral TID    nitrofurantoin  100 mg Oral Q12H    sodium chloride 0.9%  10 mL Intravenous Q6H    sucralfate  1 g Oral QID (AC & HS)       Objective:  Recent Vitals:  Temp:  [98 °F (36.7 °C)-98.5 °F (36.9 °C)] 98.1 °F (36.7 °C)  Pulse:  [] 64  Resp:  [14-24] 19  SpO2:  [90 %-97 %] 96 %  BP: ()/() 120/103    Physical Exam  Vitals and nursing note reviewed.   Constitutional:       General: She is awake. She is not in acute distress.     Appearance: Normal appearance. She is not toxic-appearing or diaphoretic.   HENT:      Head: Normocephalic and atraumatic.   Eyes:      Extraocular Movements: Extraocular movements intact.      Pupils: Pupils are equal, round, and reactive to light.   Cardiovascular:      Rate and Rhythm: Normal rate and regular rhythm.      Pulses: Normal pulses.           Radial pulses are 2+ on the right  side and 2+ on the left side.        Dorsalis pedis pulses are 2+ on the right side and 2+ on the left side.      Heart sounds: Normal heart sounds.   Pulmonary:      Effort: Pulmonary effort is normal.      Breath sounds: Normal breath sounds.   Musculoskeletal:      Right lower leg: No edema.      Left lower leg: No edema.   Skin:     General: Skin is warm and dry.      Comments: INCISION C/D/I   Neurological:      General: No focal deficit present.      Mental Status: She is alert and oriented to person, place, and time.   Psychiatric:         Mood and Affect: Mood and affect normal.        I/O last 24 hrs:  Intake/Output - Last 3 Shifts         10/04 0700  10/05 0659 10/05 0700  10/06 0659 10/06 0700  10/07 0659    I.V. (mL/kg) 621.6 (9.5) 44.5 (0.7)     IV Piggyback 0 100     Total Intake(mL/kg) 621.6 (9.5) 144.5 (2.2)     Urine (mL/kg/hr) 2050 (1.3) 1650 (1.1)     Stool       Total Output 2050 1650     Net -1428.4 -1505.5                    Labs  BMP:   Recent Labs   Lab 10/06/22  0120      K 3.2*   CO2 30   BUN 18.9   CREATININE 0.70   CALCIUM 8.7   MG 1.90       CBC:   Recent Labs   Lab 10/06/22  0120   WBC 9.6   RBC 4.40   HGB 13.4   HCT 40.5      MCV 92.0   MCH 30.5   MCHC 33.1       All pertinent labs from the last 24 hours have been reviewed.      Imaging:   CXR: X-Ray Chest AP Portable    Result Date: 9/29/2022  Mild bibasilar atelectasis with possible small pleural effusions. Electronically signed by: Ivan Chan Date:    09/29/2022 Time:    07:20   I have reviewed all pertinent imaging results/findings within the past 24 hours.        ASSESSMENT/PLAN:  - Transfer to telemetry  - cardiology recs appreciated  - IS/OOB/Ambulate  - PT/OT  - dc planning    Case and plan of care discussed with MD Eliseo Lentz PA-C

## 2022-10-06 NOTE — PROGRESS NOTES
10/06/22 0940   Pre Exercise Vitals   /73   Pulse 59   Supplemental O2? Yes   O2 Device nasal cannula   O2 Flow (L/min) 2   SpO2 94 %   During Exercise Vitals   Pulse 62   Supplemental O2? Yes   O2 Device nasal cannula   O2 Flow (L/min) 2   SpO2 (!) 88 %  (88-91% during ambulation)   Distance Walked 100 feet   Post Exercise Vitals   /63   Pulse 58   Supplemental O2? Yes   O2 Device nasal cannula   O2 Flow (L/min) 2   SpO2 95 %   Modality   Modality   (rollator)   Communicated with nurse prior to session.  Min assist.  Gait steady with rollator.  All monitors reconnected post ambulation.  Call bell within reach.

## 2022-10-06 NOTE — ANESTHESIA POSTPROCEDURE EVALUATION
Anesthesia Post Evaluation    Patient: Anetta Arleen    Procedure(s) Performed: * No procedures listed *    Final Anesthesia Type: general      Patient location during evaluation: ICU  Patient participation: Yes- Able to Participate  Level of consciousness: oriented and awake  Post-procedure vital signs: reviewed and stable  Pain management: adequate  Airway patency: patent    PONV status at discharge: No PONV  Anesthetic complications: no      Cardiovascular status: stable and hemodynamically stable  Respiratory status: spontaneous ventilation and unassisted  Hydration status: euvolemic  Follow-up not needed.  Comments: [facility]          Vitals Value Taken Time   /58 10/05/22 1901   Temp 36.9 °C (98.5 °F) 10/05/22 1200   Pulse 78 10/05/22 1933   Resp 20 10/05/22 1933   SpO2 96 % 10/05/22 1933   Vitals shown include unvalidated device data.      No case tracking events are documented in the log.      Pain/Arnav Score: Pain Rating Prior to Med Admin: 3 (10/4/2022 10:10 PM)  Pain Rating Post Med Admin: 0 (10/4/2022 11:10 PM)

## 2022-10-06 NOTE — PT/OT/SLP PROGRESS
Physical Therapy Treatment    Patient Name:  Sydney Bacon   MRN:  [id number]    Recommendations:     Discharge Recommendations:  rehabilitation facility versus home with hh if pt has assistance at home  Discharge Equipment Recommendations: walker, rolling   Barriers to discharge:       Assessment:     Sydney Bacon is a 76 y.o. female admitted with a medical diagnosis of AAA ligation, s/o cardioversion, post op afib rvr.  She presents with the following impairments/functional limitations:  weakness, impaired endurance, impaired self care skills, impaired functional mobility .    Rehab Prognosis: Good; patient would benefit from acute skilled PT services to address these deficits and reach maximum level of function.    Recent Surgery: Procedure(s) (LRB):  REPLACEMENT, AORTIC VALVE (N/A)  REPAIR, AORTA, ASCENDING (N/A) 8 Days Post-Op    Plan:     During this hospitalization, patient to be seen 5 x/week to address the identified rehab impairments via gait training, therapeutic activities, therapeutic exercises and progress toward the following goals:    Plan of Care Expires:  10/30/22    Subjective     Chief Complaint:   Patient/Family Comments/goals:   Pain/Comfort:         Objective:     Communicated with NSG prior to session.  Patient found up in chair with pulse ox (continuous), telemetry, blood pressure cuff upon PTA entry to room.     General Precautions: Standard, sternal   Orthopedic Precautions:N/A   Braces: N/A  Respiratory Status: Nasal cannula, flow 2 L/min     / 58 HR 62 SPO2 92%     Functional Mobility:    T/F: Bay sit <-> stand from recliner, Bay toilet t/f, pt urinated. Sternal pxn maintained.   Gait: Pt ambulated approximately 125 ft step through gait pattern, slow pace, no LOB.       Patient left up in chair with all lines intact and call button in reach..    GOALS:   Multidisciplinary Problems       Physical Therapy Goals          Problem: Physical Therapy    Goal Priority Disciplines  Outcome Goal Variances Interventions   Physical Therapy Goal     PT, PT/OT Ongoing, Progressing     Description: Goals to be met by: 10/30/2022     Patient will increase functional independence with mobility by performin. Supine to sit with Stand-by Assistance  2. Sit to stand transfer with Stand-by Assistance  3. Gait  x 200 feet with Stand-by Assistance using Rolling Walker.                          Time Tracking:     PT Received On:    PT Start Time: 1345     PT Stop Time: 1408  PT Total Time (min): 23 min     Billable Minutes: Gait Training 23    Treatment Type: Treatment  PT/PTA: PTA     PTA Visit Number: 1     10/06/2022

## 2022-10-06 NOTE — PROGRESS NOTES
"[facility] - [facility] ICU  Cardiology  Progress Note    Patient Name: Sydney Bacon  MRN: [id number]  Admission Date: 9/28/2022  Hospital Length of Stay: 8 days  Code Status: Full Code   Attending Physician: Wing Edmonds IV, MD   Primary Care Physician: Valente Bacon MD  Expected Discharge Date:   Principal Problem:<principal problem not specified>    Subjective:     Brief HPI: Ms. Bacon is a 75 y/o female who is known to [facility], Dr. Mason. She was recently worked up for VHD and found to have a AAA and Mod AR, MI/MR. She was referred to [facility] for evaluation of surgical intervention. On 9.28.22 she underwent a AAA Resection and Placement of 32mm Interposition Graft with AVR 25mm Mosaic Porcine Valve as well as a BOB Ligation with EndoStapler. She tolerated the procedure well and was admitted to ICU post operatively. She became tachycardic and was found to be in A.Fib with RVR. [facility] was consulted for this reason.     Hospital Course:   10.3.22: NAD. Remains in PAF/RVR. IV Infiltration Last PM with Amio. Back on IV Amio at 0.5mg/min. "I am ok." Denies SOB and Palps. + CP/Incisional.   10.4.22: NAD. Denies CP, SOB and Palps. "I am ok." Sitting in Bedside Chair. Remains in PAF with RVR (110s-120s)  10.5.22: NAD. Denies Cp, SOB, or palps. Sitting up in chair. NPO for scheduled MING/DCCV today. PAF w/ RVR on tele. -1428.4/24 hrs.   10.6.22: NAD. Denies CP, SOB, or palps. Sitting up in chair. SR on tele.     PMH: VHD (Mod AR, MI/MR), Chronic Systolic HF, Hypothyroidism, HTN, HLD  PSH: Cataract Surgery, Angiogram   Family History: Father, D, AAA; Mother, D, CVA, CAD  Social History: Denies alcohol, tobacco, or illicit drug use.      Previous Cardiac Diagnostics:   MING w/ DCCV 10.5.22:  LV systolic function 25-30%.  No intracardiac thrombus is present.  BOB is absent consistent with BOB surgical ligation at the time of AVR. Smoke seen in LA  Well seated bioprosthetic AV without significant stenosis or " perivalvular leak.   Successful cardioversion x 1  Recommendations:  Switch to po amiodarone with tapering dose and with holding parameters if HR <50 bpm  Short term Eliquis 5 mg po bid for 30 days only.   BOB is completely ligated, no need for long term OAC.  Continue IV diuresis, SAO2 92-93% before starting the procedure.  Repeat Limited echo in AM with Definity contrast to reassess LVEF, If LVEF < 35%, consider LifeVest prior to discharge.    [facility] 9.7.22:  Normal Coronaries  LVEF 45%     ECHO 7.21.22:  The study quality is good.   The left ventricle is moderately enlarged. Normal wall thicknesses. Global left ventricular systolic function is mildly decreased. The left ventricular ejection fraction is 45%. The left ventricle diastolic function is impaired (Grade I) with normal left atrial pressure.  The left atrium is moderately enlarged.  Dilation of the aortic root and ascending aorta is noted. Aortic root diameter is 3.8 cms. Ascending aorta diameter is 4.1 cms.   Severe (4+) eccentric aortic regurgitation. FBZ=216jot. Diastolic flow reversal is seen in the descending aorta.  Mild (1+) mitral regurgitation. Mild (1+) tricuspid regurgitation.   The estimated pulmonary artery systolic pressure is 24 mmHg assuming a right atrial pressure of 3 mmHg.      PET 5.18.18:  This is an abnormal perfusion study.   This scan is suggestive of moderate risk for future cardiovascular events.   Small partially reversible perfusion abnormality of moderate intensity in the apical segment. Small reversible perfusion abnormality of moderate intensity in the anterior septal region. Small fixed perfusion abnormality of mild intensity in the apical inferior segment.   The left ventricular cavity is noted to be markedly enlarged on the stress studies. The stress left ventricular ejection fraction was calculated to be 36% and left ventricular global function is moderately reduced. The rest left ventricular cavity is noted to be  moderately enlarged. The rest left ventricular ejection fraction was calculated to be 31% and rest left ventricular global function is moderately reduced.   When compared to the resting ejection fraction (31%), the stress ejection fraction (36%) has increased.   The study quality is good.    Review of Systems   Constitutional: Positive for malaise/fatigue.   Cardiovascular:  Negative for chest pain, irregular heartbeat and palpitations.   Respiratory:  Negative for cough and shortness of breath.    All other systems reviewed and are negative.    Objective:     Vital Signs (Most Recent):  Temp: 98.1 °F (36.7 °C) (10/06/22 0800)  Pulse: 64 (10/06/22 0800)  Resp: 19 (10/06/22 0800)  BP: (!) 120/103 (10/06/22 0800)  SpO2: 96 % (10/06/22 0800)   Vital Signs (24h Range):  Temp:  [98 °F (36.7 °C)-98.5 °F (36.9 °C)] 98.1 °F (36.7 °C)  Pulse:  [] 64  Resp:  [14-23] 19  SpO2:  [90 %-97 %] 96 %  BP: ()/() 120/103     Weight: 64.8 kg (142 lb 14.4 oz)  Body mass index is 25.31 kg/m².    SpO2: 96 %  O2 Device (Oxygen Therapy): nasal cannula      Intake/Output Summary (Last 24 hours) at 10/6/2022 1008  Last data filed at 10/6/2022 0800  Gross per 24 hour   Intake 100 ml   Output 1750 ml   Net -1650 ml       Lines/Drains/Airways       Peripheral Intravenous Line  Duration                  Midline Catheter Insertion/Assessment  - Single Lumen 10/05/22 0839 Right basilic vein (medial side of arm) other (see comments) 1 day         Peripheral IV - Single Lumen 10/05/22 0632 22 G Anterior;Left Wrist 1 day                  Significant Labs:   Recent Results (from the past 72 hour(s))   Comprehensive Metabolic Panel    Collection Time: 10/04/22  1:30 AM   Result Value Ref Range    Sodium Level 138 136 - 145 mmol/L    Potassium Level 3.4 (L) 3.5 - 5.1 mmol/L    Chloride 104 98 - 107 mmol/L    Carbon Dioxide 26 23 - 31 mmol/L    Glucose Level 96 82 - 115 mg/dL    Blood Urea Nitrogen 8.4 (L) 9.8 - 20.1 mg/dL    Creatinine  0.57 0.55 - 1.02 mg/dL    Calcium Level Total 8.2 (L) 8.4 - 10.2 mg/dL    Protein Total 5.2 (L) 5.8 - 7.6 gm/dL    Albumin Level 2.6 (L) 3.4 - 4.8 gm/dL    Globulin 2.6 2.4 - 3.5 gm/dL    Albumin/Globulin Ratio 1.0 (L) 1.1 - 2.0 ratio    Bilirubin Total 0.5 <=1.5 mg/dL    Alkaline Phosphatase 137 40 - 150 unit/L    Alanine Aminotransferase 34 0 - 55 unit/L    Aspartate Aminotransferase 40 (H) 5 - 34 unit/L    eGFR >60 mls/min/1.73/m2   Magnesium    Collection Time: 10/04/22  1:30 AM   Result Value Ref Range    Magnesium Level 1.80 1.60 - 2.60 mg/dL   CBC with Differential    Collection Time: 10/04/22  1:30 AM   Result Value Ref Range    WBC 9.5 4.5 - 11.5 x10(3)/mcL    RBC 4.27 4.20 - 5.40 x10(6)/mcL    Hgb 13.0 12.0 - 16.0 gm/dL    Hct 39.7 37.0 - 47.0 %    MCV 93.0 80.0 - 94.0 fL    MCH 30.4 27.0 - 31.0 pg    MCHC 32.7 (L) 33.0 - 36.0 mg/dL    RDW 14.0 11.5 - 17.0 %    Platelet 191 130 - 400 x10(3)/mcL    MPV 10.3 7.4 - 10.4 fL    Neut % 67.3 %    Lymph % 16.2 %    Mono % 14.7 %    Eos % 1.1 %    Basophil % 0.3 %    Lymph # 1.54 0.6 - 4.6 x10(3)/mcL    Neut # 6.4 2.1 - 9.2 x10(3)/mcL    Mono # 1.40 (H) 0.1 - 1.3 x10(3)/mcL    Eos # 0.10 0 - 0.9 x10(3)/mcL    Baso # 0.03 0 - 0.2 x10(3)/mcL    IG# 0.04 0 - 0.04 x10(3)/mcL    IG% 0.4 %    NRBC% 0.0 %   Magnesium    Collection Time: 10/05/22  1:02 AM   Result Value Ref Range    Magnesium Level 1.90 1.60 - 2.60 mg/dL   Comprehensive Metabolic Panel    Collection Time: 10/05/22  1:02 AM   Result Value Ref Range    Sodium Level 138 136 - 145 mmol/L    Potassium Level 3.6 3.5 - 5.1 mmol/L    Chloride 102 98 - 107 mmol/L    Carbon Dioxide 27 23 - 31 mmol/L    Glucose Level 91 82 - 115 mg/dL    Blood Urea Nitrogen 13.0 9.8 - 20.1 mg/dL    Creatinine 0.66 0.55 - 1.02 mg/dL    Calcium Level Total 8.3 (L) 8.4 - 10.2 mg/dL    Protein Total 5.6 (L) 5.8 - 7.6 gm/dL    Albumin Level 2.7 (L) 3.4 - 4.8 gm/dL    Globulin 2.9 2.4 - 3.5 gm/dL    Albumin/Globulin Ratio 0.9 (L) 1.1 - 2.0  ratio    Bilirubin Total 0.5 <=1.5 mg/dL    Alkaline Phosphatase 167 (H) 40 - 150 unit/L    Alanine Aminotransferase 47 0 - 55 unit/L    Aspartate Aminotransferase 55 (H) 5 - 34 unit/L    eGFR >60 mls/min/1.73/m2   CBC with Differential    Collection Time: 10/05/22  1:02 AM   Result Value Ref Range    WBC 10.5 4.5 - 11.5 x10(3)/mcL    RBC 4.51 4.20 - 5.40 x10(6)/mcL    Hgb 14.0 12.0 - 16.0 gm/dL    Hct 41.2 37.0 - 47.0 %    MCV 91.4 80.0 - 94.0 fL    MCH 31.0 27.0 - 31.0 pg    MCHC 34.0 33.0 - 36.0 mg/dL    RDW 13.7 11.5 - 17.0 %    Platelet 199 130 - 400 x10(3)/mcL    MPV 10.0 7.4 - 10.4 fL    Neut % 64.4 %    Lymph % 19.7 %    Mono % 13.0 %    Eos % 1.1 %    Basophil % 0.7 %    Lymph # 2.06 0.6 - 4.6 x10(3)/mcL    Neut # 6.8 2.1 - 9.2 x10(3)/mcL    Mono # 1.36 (H) 0.1 - 1.3 x10(3)/mcL    Eos # 0.12 0 - 0.9 x10(3)/mcL    Baso # 0.07 0 - 0.2 x10(3)/mcL    IG# 0.11 (H) 0 - 0.04 x10(3)/mcL    IG% 1.1 %    NRBC% 0.0 %   Basic Metabolic Panel    Collection Time: 10/06/22  1:20 AM   Result Value Ref Range    Sodium Level 138 136 - 145 mmol/L    Potassium Level 3.2 (L) 3.5 - 5.1 mmol/L    Chloride 100 98 - 107 mmol/L    Carbon Dioxide 30 23 - 31 mmol/L    Glucose Level 104 82 - 115 mg/dL    Blood Urea Nitrogen 18.9 9.8 - 20.1 mg/dL    Creatinine 0.70 0.55 - 1.02 mg/dL    BUN/Creatinine Ratio 27     Calcium Level Total 8.7 8.4 - 10.2 mg/dL    Anion Gap 8.0 mEq/L    eGFR >60 mls/min/1.73/m2   Magnesium    Collection Time: 10/06/22  1:20 AM   Result Value Ref Range    Magnesium Level 1.90 1.60 - 2.60 mg/dL   CBC with Differential    Collection Time: 10/06/22  1:20 AM   Result Value Ref Range    WBC 9.6 4.5 - 11.5 x10(3)/mcL    RBC 4.40 4.20 - 5.40 x10(6)/mcL    Hgb 13.4 12.0 - 16.0 gm/dL    Hct 40.5 37.0 - 47.0 %    MCV 92.0 80.0 - 94.0 fL    MCH 30.5 27.0 - 31.0 pg    MCHC 33.1 33.0 - 36.0 mg/dL    RDW 13.7 11.5 - 17.0 %    Platelet 259 130 - 400 x10(3)/mcL    MPV 10.0 7.4 - 10.4 fL    Neut % 68.8 %    Lymph % 14.7 %     Mono % 14.0 %    Eos % 1.4 %    Basophil % 0.4 %    Lymph # 1.41 0.6 - 4.6 x10(3)/mcL    Neut # 6.6 2.1 - 9.2 x10(3)/mcL    Mono # 1.35 (H) 0.1 - 1.3 x10(3)/mcL    Eos # 0.13 0 - 0.9 x10(3)/mcL    Baso # 0.04 0 - 0.2 x10(3)/mcL    IG# 0.07 (H) 0 - 0.04 x10(3)/mcL    IG% 0.7 %    NRBC% 0.0 %     Telemetry: PAF/RVR    Physical Exam  Constitutional:       Appearance: Normal appearance.   HENT:      Head: Normocephalic.      Mouth/Throat:      Mouth: Mucous membranes are moist.   Eyes:      Extraocular Movements: Extraocular movements intact.   Cardiovascular:      Rate and Rhythm: Regular rhythm. Bradycardia present.      Pulses: Normal pulses.   Pulmonary:      Effort: Pulmonary effort is normal.      Breath sounds: Decreased breath sounds present.   Abdominal:      Palpations: Abdomen is soft.   Musculoskeletal:         General: Normal range of motion.   Skin:     General: Skin is warm and dry.      Capillary Refill: Capillary refill takes less than 2 seconds.      Comments: Midline Sternotomy SIA C/D/I   Neurological:      General: No focal deficit present.      Mental Status: She is alert and oriented to person, place, and time.   Psychiatric:         Mood and Affect: Mood normal.         Behavior: Behavior normal.     Current Inpatient Medications:    Current Facility-Administered Medications:     0.9%  NaCl infusion (for blood administration), , Intravenous, Q24H PRN, Wing Edmonds IV, MD    albumin human 5% bottle 12.5 g, 12.5 g, Intravenous, PRN, ROSANGELA Alfonso, Stopped at 09/29/22 0258    amiodarone 360 mg/200 mL (1.8 mg/mL) infusion, 0.5 mg/min, Intravenous, Continuous, JUNE Ramirez, Last Rate: 16.7 mL/hr at 10/05/22 0927, 0.5 mg/min at 10/05/22 0927    calcium gluconate 1 g in NS IVPB (premixed), 1 g, Intravenous, PRN, ROSANGELA Alfonso    calcium gluconate 1 g in NS IVPB (premixed), 2 g, Intravenous, PRN, ROSANGELA Alfonso    calcium gluconate 1 g in NS IVPB (premixed), 3 g, Intravenous, PRN, Eliseo Lentz,  PA    dextrose 10% bolus 125 mL, 12.5 g, Intravenous, PRN, Wing Edmonds IV, MD    dextrose 10% bolus 250 mL, 25 g, Intravenous, PRN, Wing Edmonds IV, MD    docusate sodium capsule 100 mg, 100 mg, Oral, BID, ROSANGELA Alfonso, 100 mg at 10/06/22 0800    enoxaparin injection 40 mg, 40 mg, Subcutaneous, Daily, ROSANGELA Alfonso, 40 mg at 10/05/22 1610    famotidine (PF) injection 20 mg, 20 mg, Intravenous, Daily, ROSANGELA Alfonso, 20 mg at 10/06/22 0800    folic acid tablet 1 mg, 1 mg, Oral, Daily, ROSANGELA Alfonso, 1 mg at 10/06/22 0800    furosemide injection 40 mg, 40 mg, Intravenous, Q12H, JUNE Ramirez, 40 mg at 10/06/22 0445    HYDROcodone-acetaminophen 5-325 mg per tablet 1 tablet, 1 tablet, Oral, Q4H PRN, ROSANGELA Alfonso, 1 tablet at 10/04/22 2210    lactulose 10 gram/15 ml solution 20 g, 20 g, Oral, Q6H PRN, ROSANGELA Alfonso    loperamide capsule 4 mg, 4 mg, Oral, Once, ROSANGELA Alfonso    magnesium sulfate 2g in water 50mL IVPB (premix), 2 g, Intravenous, PRN, ROSANGELA Alfonso    magnesium sulfate 2g in water 50mL IVPB (premix), 4 g, Intravenous, PRN, ROSANGELA Alfonso    metoclopramide HCl injection 5 mg, 5 mg, Intravenous, Q6H PRN, ROSANGELA Alfonso    metoprolol tartrate (LOPRESSOR) tablet 50 mg, 50 mg, Oral, TID, JUNE Ramirez, 50 mg at 10/06/22 0800    morphine injection 2 mg, 2 mg, Intravenous, PRN, ROSANGELA Alfonso    nitrofurantoin capsule 100 mg, 100 mg, Oral, Q12H, Wing Edmonds IV, MD, 100 mg at 10/06/22 0800    ondansetron injection 8 mg, 8 mg, Intravenous, Q8H PRN, ROSANGELA Alfonso, 8 mg at 10/06/22 0009    potassium chloride 20 mEq in 100 mL IVPB (FOR CENTRAL LINE ADMINISTRATION ONLY), 20 mEq, Intravenous, PRN, ROSANGELA Alfonso, Last Rate: 50 mL/hr at 10/05/22 0800, Rate Verify at 10/05/22 0800    potassium chloride 20 mEq in 100 mL IVPB (FOR CENTRAL LINE ADMINISTRATION ONLY), 40 mEq, Intravenous, PRN, ROSANGELA Alfonso, Last Rate: 25 mL/hr at 10/05/22 0800, Rate Verify at 10/05/22 0800    sodium chloride  0.9% flush 10 mL, 10 mL, Intravenous, PRN, Ciro Rg MD    Flushing PICC Protocol, , , Until Discontinued **AND** sodium chloride 0.9% flush 10 mL, 10 mL, Intravenous, Q6H, 10 mL at 10/06/22 0600 **AND** sodium chloride 0.9% flush 10 mL, 10 mL, Intravenous, PRN, Yun Rivera NP    sodium chloride 0.9% flush 2 mL, 2 mL, Intravenous, PRN, ROSANGELA Alfonso    sodium phosphate 15 mmol in dextrose 5 % 250 mL IVPB, 15 mmol, Intravenous, PRN, ROSANGELA Alfonso    sodium phosphate 20.01 mmol in dextrose 5 % 250 mL IVPB, 20.01 mmol, Intravenous, PRN, ROSANGELA Alfonso    sodium phosphate 30 mmol in dextrose 5 % 250 mL IVPB, 30 mmol, Intravenous, PRN, Eliseo Lentz, PA    sucralfate tablet 1 g, 1 g, Oral, QID (AC & HS), ROSANGELA Alfonso, 1 g at 10/06/22 0602    VTE Risk Mitigation (From admission, onward)           Ordered     enoxaparin injection 40 mg  Daily         09/28/22 1021     Place sequential compression device  Until discontinued         09/28/22 1021     IP VTE HIGH RISK PATIENT  Once         09/28/22 1010                  Assessment:   VHD (Mod AR, Mild MI/MR)    - s/p (9.28.22) - AVR 25mm Mosaic Porcine Valve  AAA    - s/p (9.28.22) - AAA Resection and Placement of 32mm Interposition Graft  PAF with RVR    - CHADsVASc - 5 Points - 7.2% Stroke Risk per Year     - s/p BOB Ligation (9.28.22) with Endostapler  Acute on Chronic Systolic HF/EF 45%  Hx of Hypothyroidism  HTN  HLD  No Hx of GIB    Plan:   Discontinue IV amiodarone and start oral load: amio 400 mg BID x 3 days, then amio 200 mg BID x 3 days, then 200 mg daily.  Continue BB - Metoprolol Tartrate 50mg PO TID   No ASA Secondary to Allergy  Start Short term Eliquis 5 mg po bid for 30 days only.   BOB is completely ligated, no need for long term OAC.  Obtain Echo to evaluate LVEF. If < 35%, consider LifeVest prior to discharge.   Continue Lasix 40mg IVP Q12HRs  Plans for discharge to rehab when accepted.  Accurate I&Os and Daily Weights  Labs in AM: CBC, CMP  and LOREN Hodges  Cardiology  [facility] - [address] [place] [address]  10/06/2022

## 2022-10-06 NOTE — PT/OT/SLP PROGRESS
Occupational Therapy   Treatment    Name: Sydney Bacon  MRN: [id number]  Admitting Diagnosis:  AAA resection, AVR 8 Days Post-Op    Recommendations:     Discharge Recommendations: rehabilitation facility  Discharge Equipment Recommendations:   (RW v rollator; SC v TTB)  Barriers to discharge:  Decreased caregiver support    Assessment:     Sydney Bacon is a 76 y.o. female with a medical diagnosis of AAA resection, AVR, ligation BOB. She is now s/p cradioversion d/t post op A fiv with RVR. She presents with progress toward goals, but would benefit from rehab placement as she lives alone and was nindependent with no DME at baseline. Performance deficits affecting function are weakness, impaired endurance, impaired self care skills, impaired functional mobility.     Rehab Prognosis:  Good; patient would benefit from acute skilled OT services to address these deficits and reach maximum level of function.       Plan:     Patient to be seen 5 x/week, 6 x/week to address the above listed problems via self-care/home management, therapeutic activities, therapeutic exercises  Plan of Care Expires: 10/18/22  Plan of Care Reviewed with: patient    Subjective     Pain/Comfort:  Pain Rating 1: 0/10    Objective:     Communicated with: RN prior to session.  Patient found up in chair with pulse ox (continuous), telemetry, blood pressure cuff upon OT entry to room.    General Precautions: Standard, sternal   Orthopedic Precautions:N/A   Braces: N/A  /73  HR 56     Occupational Performance:     Functional Mobility/Transfers:  Patient completed Sit <> Stand Transfer with minimum assistance  with  rolling walker   Patient completed Toilet Transfer Step Transfer technique with minimum assistance with  rolling walker  Functional Mobility: min A chair<>toilet with RW    Activities of Daily Living:  Feeding:  independence UIC  Grooming: contact guard assistance standing at RW.  Lower Body Dressing: minimum assistance don/doff  socks from figure 4 in chair with training on compensatory techniques   Toileting: minimum assistance +void on toilet, VC for sternal precautions during adolfo care      [facility] 6 Click ADL: 17    Treatment & Education:  ADL training provided with emphasis on compensatory techniques and sternal pxn compliance. Pt pleasant, motivated, and cooperative, great rehab candidate.     Patient left up in chair with all lines intact    GOALS:   Multidisciplinary Problems       Occupational Therapy Goals          Problem: Occupational Therapy    Goal Priority Disciplines Outcome Interventions   Occupational Therapy Goal     OT, PT/OT Ongoing, Progressing    Description: 1. Pt will ambulate with RW to bathroom and complete toilet t/f mod I.  2. Pt will complete UE dressing with sternal pxns mod I.  3. Pt will complete LE dressing with sternal pxns mod I.  4. Pt will complete toileting tasks mod I sternal pxns.                       Time Tracking:     OT Date of Treatment: 10/06/22  OT Start Time: 0925  OT Stop Time: 0948  OT Total Time (min): 23 min    Billable Minutes:Self Care/Home Management 2    OT/SIA: OT     SIA Visit Number: 1    10/6/2022

## 2022-10-07 LAB
ANION GAP SERPL CALC-SCNC: 8 MEQ/L
BASOPHILS # BLD AUTO: 0.04 X10(3)/MCL (ref 0–0.2)
BASOPHILS NFR BLD AUTO: 0.5 %
BUN SERPL-MCNC: 18.8 MG/DL (ref 9.8–20.1)
CALCIUM SERPL-MCNC: 8.3 MG/DL (ref 8.4–10.2)
CHLORIDE SERPL-SCNC: 99 MMOL/L (ref 98–107)
CO2 SERPL-SCNC: 31 MMOL/L (ref 23–31)
CREAT SERPL-MCNC: 0.62 MG/DL (ref 0.55–1.02)
CREAT/UREA NIT SERPL: 30
EOSINOPHIL # BLD AUTO: 0.16 X10(3)/MCL (ref 0–0.9)
EOSINOPHIL NFR BLD AUTO: 1.8 %
ERYTHROCYTE [DISTWIDTH] IN BLOOD BY AUTOMATED COUNT: 13.6 % (ref 11.5–17)
GFR SERPLBLD CREATININE-BSD FMLA CKD-EPI: >60 MLS/MIN/1.73/M2
GLUCOSE SERPL-MCNC: 115 MG/DL (ref 70–110)
GLUCOSE SERPL-MCNC: 146 MG/DL (ref 70–110)
GLUCOSE SERPL-MCNC: 146 MG/DL (ref 70–110)
GLUCOSE SERPL-MCNC: 93 MG/DL (ref 82–115)
HCO3 UR-SCNC: 25.7 MMOL/L (ref 24–28)
HCO3 UR-SCNC: 26.5 MMOL/L (ref 24–28)
HCO3 UR-SCNC: 26.5 MMOL/L (ref 24–28)
HCT VFR BLD AUTO: 39.2 % (ref 37–47)
HCT VFR BLD CALC: 27 %PCV (ref 36–54)
HCT VFR BLD CALC: 27 %PCV (ref 36–54)
HCT VFR BLD CALC: 38 %PCV (ref 36–54)
HGB BLD-MCNC: 13 G/DL
HGB BLD-MCNC: 13.2 GM/DL (ref 12–16)
HGB BLD-MCNC: 9 G/DL
HGB BLD-MCNC: 9 G/DL
IMM GRANULOCYTES # BLD AUTO: 0.06 X10(3)/MCL (ref 0–0.04)
IMM GRANULOCYTES NFR BLD AUTO: 0.7 %
LYMPHOCYTES # BLD AUTO: 1.92 X10(3)/MCL (ref 0.6–4.6)
LYMPHOCYTES NFR BLD AUTO: 21.9 %
MAGNESIUM SERPL-MCNC: 1.8 MG/DL (ref 1.6–2.6)
MCH RBC QN AUTO: 31 PG (ref 27–31)
MCHC RBC AUTO-ENTMCNC: 33.7 MG/DL (ref 33–36)
MCV RBC AUTO: 92 FL (ref 80–94)
MONOCYTES # BLD AUTO: 1.17 X10(3)/MCL (ref 0.1–1.3)
MONOCYTES NFR BLD AUTO: 13.4 %
NEUTROPHILS # BLD AUTO: 5.4 X10(3)/MCL (ref 2.1–9.2)
NEUTROPHILS NFR BLD AUTO: 61.7 %
NRBC BLD AUTO-RTO: 0 %
PCO2 BLDA: 42.8 MMHG (ref 35–45)
PCO2 BLDA: 42.8 MMHG (ref 35–45)
PCO2 BLDA: 51.4 MMHG (ref 35–45)
PH SMN: 7.31 [PH] (ref 7.35–7.45)
PH SMN: 7.4 [PH] (ref 7.35–7.45)
PH SMN: 7.4 [PH] (ref 7.35–7.45)
PLATELET # BLD AUTO: 267 X10(3)/MCL (ref 130–400)
PMV BLD AUTO: 9.7 FL (ref 7.4–10.4)
PO2 BLDA: 240 MMHG (ref 80–100)
PO2 BLDA: 331 MMHG (ref 80–100)
PO2 BLDA: 331 MMHG (ref 80–100)
POC BE: -1 MMOL/L
POC BE: 2 MMOL/L
POC BE: 2 MMOL/L
POC IONIZED CALCIUM: 1.56 MMOL/L (ref 1.06–1.42)
POC IONIZED CALCIUM: 1.85 MMOL/L (ref 1.06–1.42)
POC IONIZED CALCIUM: 1.85 MMOL/L (ref 1.06–1.42)
POC SATURATED O2: 100 % (ref 95–100)
POC TCO2: 27 MMOL/L (ref 23–27)
POC TCO2: 28 MMOL/L (ref 23–27)
POC TCO2: 28 MMOL/L (ref 23–27)
POCT GLUCOSE: 126 MG/DL (ref 70–110)
POTASSIUM BLD-SCNC: 3 MMOL/L (ref 3.5–5.1)
POTASSIUM BLD-SCNC: 3.4 MMOL/L (ref 3.5–5.1)
POTASSIUM BLD-SCNC: 3.4 MMOL/L (ref 3.5–5.1)
POTASSIUM SERPL-SCNC: 3.5 MMOL/L (ref 3.5–5.1)
RBC # BLD AUTO: 4.26 X10(6)/MCL (ref 4.2–5.4)
SAMPLE: ABNORMAL
SODIUM BLD-SCNC: 143 MMOL/L (ref 136–145)
SODIUM BLD-SCNC: 143 MMOL/L (ref 136–145)
SODIUM BLD-SCNC: 145 MMOL/L (ref 136–145)
SODIUM SERPL-SCNC: 138 MMOL/L (ref 136–145)
WBC # SPEC AUTO: 8.8 X10(3)/MCL (ref 4.5–11.5)

## 2022-10-07 PROCEDURE — 25000003 PHARM REV CODE 250

## 2022-10-07 PROCEDURE — 80048 BASIC METABOLIC PNL TOTAL CA: CPT

## 2022-10-07 PROCEDURE — A4216 STERILE WATER/SALINE, 10 ML: HCPCS

## 2022-10-07 PROCEDURE — 25000003 PHARM REV CODE 250: Performed by: NURSE PRACTITIONER

## 2022-10-07 PROCEDURE — 63600175 PHARM REV CODE 636 W HCPCS

## 2022-10-07 PROCEDURE — 94760 N-INVAS EAR/PLS OXIMETRY 1: CPT

## 2022-10-07 PROCEDURE — 85025 COMPLETE CBC W/AUTO DIFF WBC: CPT

## 2022-10-07 PROCEDURE — 97110 THERAPEUTIC EXERCISES: CPT

## 2022-10-07 PROCEDURE — 97535 SELF CARE MNGMENT TRAINING: CPT

## 2022-10-07 PROCEDURE — 36415 COLL VENOUS BLD VENIPUNCTURE: CPT

## 2022-10-07 PROCEDURE — 83735 ASSAY OF MAGNESIUM: CPT

## 2022-10-07 PROCEDURE — 21400001 HC TELEMETRY ROOM

## 2022-10-07 PROCEDURE — A4216 STERILE WATER/SALINE, 10 ML: HCPCS | Performed by: NURSE PRACTITIONER

## 2022-10-07 PROCEDURE — 99024 POSTOP FOLLOW-UP VISIT: CPT | Mod: POP,,,

## 2022-10-07 PROCEDURE — 63600175 PHARM REV CODE 636 W HCPCS: Performed by: NURSE PRACTITIONER

## 2022-10-07 PROCEDURE — 99024 PR POST-OP FOLLOW-UP VISIT: ICD-10-PCS | Mod: POP,,,

## 2022-10-07 PROCEDURE — 25000003 PHARM REV CODE 250: Performed by: SPECIALIST

## 2022-10-07 PROCEDURE — 97116 GAIT TRAINING THERAPY: CPT | Mod: CQ

## 2022-10-07 RX ORDER — METOPROLOL SUCCINATE 50 MG/1
100 TABLET, EXTENDED RELEASE ORAL DAILY
Status: DISCONTINUED | OUTPATIENT
Start: 2022-10-08 | End: 2022-10-11

## 2022-10-07 RX ORDER — POLYETHYLENE GLYCOL 3350 17 G/17G
17 POWDER, FOR SOLUTION ORAL 2 TIMES DAILY PRN
Status: DISCONTINUED | OUTPATIENT
Start: 2022-10-07 | End: 2022-10-12 | Stop reason: HOSPADM

## 2022-10-07 RX ADMIN — POLYETHYLENE GLYCOL 3350 17 G: 17 POWDER, FOR SOLUTION ORAL at 08:10

## 2022-10-07 RX ADMIN — NITROFURANTOIN MACROCRYSTALS 100 MG: 50 CAPSULE ORAL at 08:10

## 2022-10-07 RX ADMIN — APIXABAN 5 MG: 5 TABLET, FILM COATED ORAL at 08:10

## 2022-10-07 RX ADMIN — DOCUSATE SODIUM 100 MG: 100 CAPSULE, LIQUID FILLED ORAL at 08:10

## 2022-10-07 RX ADMIN — SUCRALFATE 1 G: 1 TABLET ORAL at 11:10

## 2022-10-07 RX ADMIN — SUCRALFATE 1 G: 1 TABLET ORAL at 06:10

## 2022-10-07 RX ADMIN — FOLIC ACID 1 MG: 1 TABLET ORAL at 08:10

## 2022-10-07 RX ADMIN — FAMOTIDINE 20 MG: 10 INJECTION INTRAVENOUS at 08:10

## 2022-10-07 RX ADMIN — HYDROCODONE BITARTRATE AND ACETAMINOPHEN 1 TABLET: 5; 325 TABLET ORAL at 11:10

## 2022-10-07 RX ADMIN — METOPROLOL TARTRATE 50 MG: 50 TABLET, FILM COATED ORAL at 08:10

## 2022-10-07 RX ADMIN — AMIODARONE HYDROCHLORIDE 400 MG: 200 TABLET ORAL at 08:10

## 2022-10-07 RX ADMIN — SACUBITRIL AND VALSARTAN 1 TABLET: 24; 26 TABLET, FILM COATED ORAL at 11:10

## 2022-10-07 RX ADMIN — FUROSEMIDE 40 MG: 10 INJECTION, SOLUTION INTRAMUSCULAR; INTRAVENOUS at 03:10

## 2022-10-07 RX ADMIN — FUROSEMIDE 40 MG: 10 INJECTION, SOLUTION INTRAMUSCULAR; INTRAVENOUS at 04:10

## 2022-10-07 RX ADMIN — SUCRALFATE 1 G: 1 TABLET ORAL at 07:10

## 2022-10-07 RX ADMIN — SODIUM CHLORIDE, PRESERVATIVE FREE 10 ML: 5 INJECTION INTRAVENOUS at 12:10

## 2022-10-07 RX ADMIN — SODIUM CHLORIDE, PRESERVATIVE FREE 10 ML: 5 INJECTION INTRAVENOUS at 06:10

## 2022-10-07 RX ADMIN — Medication 2 ML: at 11:10

## 2022-10-07 NOTE — CARE UPDATE
[id number] Spoke with [facility] who reported pt needs a life vest prior to going to rehab; not a linq recorder

## 2022-10-07 NOTE — PT/OT/SLP PROGRESS
Occupational Therapy   Treatment    Name: Sydney Bacon  MRN: [id number]  Admitting Diagnosis:  AAA resection, AVR 9 Days Post-Op    Recommendations:     Discharge Recommendations: rehabilitation facility  Discharge Equipment Recommendations:   (RW v rollator; SC v TTB)  Barriers to discharge:  Decreased caregiver support    Assessment:     Sydney Bacon is a 76 y.o. female with a medical diagnosis of AAA resection, AVR, ligation BOB. She is now s/p cradioversion d/t post op A fib with RVR. She presents with progress toward goals, but would benefit from rehab placement as she lives alone, has limited support on d/c, and was independent with no DME at baseline. Performance deficits affecting function are weakness, impaired endurance, impaired self care skills, impaired functional mobility.     Rehab Prognosis:  Good; patient would benefit from acute skilled OT services to address these deficits and reach maximum level of function.       Plan:     Patient to be seen 5 x/week, 6 x/week to address the above listed problems via self-care/home management, therapeutic activities, therapeutic exercises  Plan of Care Expires: 10/18/22  Plan of Care Reviewed with: patient    Subjective     Pain/Comfort:  Pain Rating 1: 0/10    Objective:     Communicated with: RN prior to session.  Patient found up in chair with pulse ox (continuous), telemetry, blood pressure cuff upon OT entry to room.    General Precautions: Standard, sternal   Orthopedic Precautions:N/A   Oxygen: 2L nasal canula     Occupational Performance:     Functional Mobility/Transfers:  Bed mobility: supine<>sit minimal assist from HOB ~30* with training on technique for sternal pxns  Patient completed Sit <> Stand Transfer with contact guard assistance (able to maintain standing balance SBA x 2 min during ADL training)    Activities of Daily Living:  Upper Body Dressing: min A to don/doff pull over shirt  Lower Body Dressing: minimum assistance don/doff elastic  waist pants         Treatment & Education:  ADL training provided with emphasis on compensatory techniques and sternal pxn compliance. Pt pleasant, motivated, and cooperative, great rehab candidate.     Patient left HOB elevated with all lines intact    GOALS:   Multidisciplinary Problems       Occupational Therapy Goals          Problem: Occupational Therapy    Goal Priority Disciplines Outcome Interventions   Occupational Therapy Goal     OT, PT/OT Ongoing, Progressing    Description: 1. Pt will ambulate with RW to bathroom and complete toilet t/f mod I.  2. Pt will complete UE dressing with sternal pxns mod I.  3. Pt will complete LE dressing with sternal pxns mod I.  4. Pt will complete toileting tasks mod I sternal pxns.                       Time Tracking:     OT Date of Treatment: 10/07/22  OT Start Time: 1354  OT Stop Time: 1420  OT Total Time (min): 26 min    Billable Minutes:Self Care/Home Management 2    OT/SIA: OT     SIA Visit Number: 2    10/7/2022

## 2022-10-07 NOTE — PLAN OF CARE
1400 Spoke to Erin ph# [phone] with [facility] and she states that the  transfer would not take place today- went in to speak to pt and fmly at bedside.  Informed Life Vest ordered and will be fitted.  Also discussed no transfer to rehab today, informed pt will re-eval on Monday as fmly is insistent that pt lives alone and does not have help to bathroom and personal care etc.  Pt states she would like to be more independent and strong before going home.  She would also like [facility] as Dr. Hill in [place] is her cardiologist and she wants to be close to him.    1500 Message left for Erin to proceed with referral process

## 2022-10-07 NOTE — PROGRESS NOTES
10/07/22 0825   Pre Exercise Vitals   /54   Pulse 67   Supplemental O2? Yes   O2 Device nasal cannula   O2 Flow (L/min) 2   SpO2 94 %   During Exercise Vitals   Supplemental O2? Yes   O2 Device nasal cannula   O2 Flow (L/min) 2   Distance Walked 200 feet   Post Exercise Vitals   /69   Pulse 84   Supplemental O2? Yes   O2 Device nasal cannula   O2 Flow (L/min) 2   SpO2 92 %   Modality   Modality   (rollator)   Communicated with nurse.  Pt to be transferred to room 332.  ICU nurse already called report to 3rd floor.  Min assist.  Sternal precautions maintained.  Ambulated from ICU to room 332.  Gait strong.  3rd floor nurse present upon arrival.

## 2022-10-07 NOTE — PROGRESS NOTES
CT SURGERY PROGRESS NOTE  Sydney Coxot  76 y.o.  1945    Patients Procedure: Procedure(s) (LRB):  REPLACEMENT, AORTIC VALVE (N/A)  REPAIR, AORTA, ASCENDING (N/A)    Subjective  Interval History: NAEO. in NSR. Patient with no complaints, stable labs. Per CM patient needs linq recorder prior to rehab. ECHO showed EF of 20-25% cardiology to consider life vest based on previous notes. Rehab dc pending.    Review of Systems   Constitutional: Negative.    Respiratory:  Negative for cough, sputum production and shortness of breath.    Cardiovascular:  Negative for chest pain, palpitations, claudication and leg swelling.   Gastrointestinal:  Negative for abdominal pain, nausea and vomiting.   Genitourinary: Negative.    Skin: Negative.         Incision C/D/I     Medication List  Infusions      Scheduled   [START ON 10/9/2022] amiodarone  200 mg Oral Daily    [START ON 10/12/2022] amiodarone  200 mg Oral Daily    amiodarone  400 mg Oral BID    apixaban  5 mg Oral BID    docusate sodium  100 mg Oral BID    famotidine (PF)  20 mg Intravenous Daily    folic acid  1 mg Oral Daily    furosemide (LASIX) injection  40 mg Intravenous Q12H    loperamide  4 mg Oral Once    metoprolol tartrate  50 mg Oral TID    sodium chloride 0.9%  10 mL Intravenous Q6H    sucralfate  1 g Oral QID (AC & HS)       Objective:  Recent Vitals:  Temp:  [97 °F (36.1 °C)-98.5 °F (36.9 °C)] 97.5 °F (36.4 °C)  Pulse:  [54-84] 84  Resp:  [14-29] 18  SpO2:  [88 %-97 %] 93 %  BP: ()/(42-71) 119/69    Physical Exam  Vitals and nursing note reviewed.   Constitutional:       General: She is awake. She is not in acute distress.     Appearance: Normal appearance. She is not toxic-appearing or diaphoretic.   HENT:      Head: Normocephalic and atraumatic.   Eyes:      Extraocular Movements: Extraocular movements intact.      Pupils: Pupils are equal, round, and reactive to light.   Cardiovascular:      Rate and Rhythm: Normal rate and regular rhythm.       Pulses: Normal pulses.           Radial pulses are 2+ on the right side and 2+ on the left side.        Dorsalis pedis pulses are 2+ on the right side and 2+ on the left side.      Heart sounds: Normal heart sounds.   Pulmonary:      Effort: Pulmonary effort is normal.      Breath sounds: Normal breath sounds.   Musculoskeletal:      Right lower leg: No edema.      Left lower leg: No edema.   Skin:     General: Skin is warm and dry.      Comments: INCISION C/D/I   Neurological:      General: No focal deficit present.      Mental Status: She is alert and oriented to person, place, and time.   Psychiatric:         Mood and Affect: Mood and affect normal.        I/O last 24 hrs:  Intake/Output - Last 3 Shifts         10/05 0700  10/06 0659 10/06 0700  10/07 0659 10/07 0700  10/08 0659    I.V. (mL/kg) 44.5 (0.7)      IV Piggyback 100      Total Intake(mL/kg) 144.5 (2.2)      Urine (mL/kg/hr) 1650 (1.1) 2700 (1.8)     Stool  0     Total Output 1650 2700     Net -1505.5 -2700            Stool Occurrence  0 x             Labs  BMP:   Recent Labs   Lab 10/07/22  0132      K 3.5   CO2 31   BUN 18.8   CREATININE 0.62   CALCIUM 8.3*   MG 1.80       CBC:   Recent Labs   Lab 10/07/22  0132   WBC 8.8   RBC 4.26   HGB 13.2   HCT 39.2      MCV 92.0   MCH 31.0   MCHC 33.7       All pertinent labs from the last 24 hours have been reviewed.      Imaging:   CXR: X-Ray Chest AP Portable    Result Date: 9/29/2022  Mild bibasilar atelectasis with possible small pleural effusions. Electronically signed by: Ivan Chan Date:    09/29/2022 Time:    07:20   I have reviewed all pertinent imaging results/findings within the past 24 hours.        ASSESSMENT/PLAN:  - Transfer to telemetry  - cardiology recs appreciated-  life vest fitting, linq implant  - IS/OOB/Ambulate  - PT/OT  - rehab dc planning    Case and plan of care discussed with MD Eliseo Lentz PA-C

## 2022-10-07 NOTE — PROGRESS NOTES
"[facility] - [facility]  Cardiology  Progress Note    Patient Name: Sydney Bacon  MRN: [id number]  Admission Date: 9/28/2022  Hospital Length of Stay: 9 days  Code Status: Full Code   Attending Physician: Wing Edmonds IV, MD   Primary Care Physician: Valente Bacon MD  Expected Discharge Date:   Principal Problem:<principal problem not specified>    Subjective:     Brief HPI: Ms. Bacon is a 75 y/o female who is known to [facility], Dr. Mason. She was recently worked up for VHD and found to have a AAA and Mod AR, MI/MR. She was referred to [facility] for evaluation of surgical intervention. On 9.28.22 she underwent a AAA Resection and Placement of 32mm Interposition Graft with AVR 25mm Mosaic Porcine Valve as well as a BOB Ligation with EndoStapler. She tolerated the procedure well and was admitted to ICU post operatively. She became tachycardic and was found to be in A.Fib with RVR. [facility] was consulted for this reason.     Hospital Course:   10.3.22: NAD. Remains in PAF/RVR. IV Infiltration Last PM with Amio. Back on IV Amio at 0.5mg/min. "I am ok." Denies SOB and Palps. + CP/Incisional.   10.4.22: NAD. Denies CP, SOB and Palps. "I am ok." Sitting in Bedside Chair. Remains in PAF with RVR (110s-120s)  10.5.22: NAD. Denies Cp, SOB, or palps. Sitting up in chair. NPO for scheduled MING/DCCV today. PAF w/ RVR on tele. -1428.4/24 hrs.   10.6.22: NAD. Denies CP, SOB, or palps. Sitting up in chair. SR on tele.   10.7.22: NAD noted. Denies CP/SOB/Palps. SR on tele. Awaiting rehab placement    PMH: VHD (Mod AR, MI/MR), Chronic Systolic HF, Hypothyroidism, HTN, HLD  PSH: Cataract Surgery, Angiogram   Family History: Father, D, AAA; Mother, D, CVA, CAD  Social History: Denies alcohol, tobacco, or illicit drug use.      Previous Cardiac Diagnostics:   Echo Limited 10.6.22  Concentric hypertrophy and severely decreased systolic function.    The estimated ejection fraction is 20-25%.    Limited study to assess LV " function    MING w/ DCCV 10.5.22:  LV systolic function 25-30%.  No intracardiac thrombus is present.  BOB is absent consistent with BOB surgical ligation at the time of AVR. Smoke seen in LA  Well seated bioprosthetic AV without significant stenosis or perivalvular leak.   Successful cardioversion x 1  Recommendations:  Switch to po amiodarone with tapering dose and with holding parameters if HR <50 bpm  Short term Eliquis 5 mg po bid for 30 days only.   BOB is completely ligated, no need for long term OAC.  Continue IV diuresis, SAO2 92-93% before starting the procedure.  Repeat Limited echo in AM with Definity contrast to reassess LVEF, If LVEF < 35%, consider LifeVest prior to discharge.    LHC 9.7.22:  Normal Coronaries  LVEF 45%     ECHO 7.21.22:  The study quality is good.   The left ventricle is moderately enlarged. Normal wall thicknesses. Global left ventricular systolic function is mildly decreased. The left ventricular ejection fraction is 45%. The left ventricle diastolic function is impaired (Grade I) with normal left atrial pressure.  The left atrium is moderately enlarged.  Dilation of the aortic root and ascending aorta is noted. Aortic root diameter is 3.8 cms. Ascending aorta diameter is 4.1 cms.   Severe (4+) eccentric aortic regurgitation. [id number]. Diastolic flow reversal is seen in the descending aorta.  Mild (1+) mitral regurgitation. Mild (1+) tricuspid regurgitation.   The estimated pulmonary artery systolic pressure is 24 mmHg assuming a right atrial pressure of 3 mmHg.      PET 5.18.18:  This is an abnormal perfusion study.   This scan is suggestive of moderate risk for future cardiovascular events.   Small partially reversible perfusion abnormality of moderate intensity in the apical segment. Small reversible perfusion abnormality of moderate intensity in the anterior septal region. Small fixed perfusion abnormality of mild intensity in the apical inferior segment.   The left  ventricular cavity is noted to be markedly enlarged on the stress studies. The stress left ventricular ejection fraction was calculated to be 36% and left ventricular global function is moderately reduced. The rest left ventricular cavity is noted to be moderately enlarged. The rest left ventricular ejection fraction was calculated to be 31% and rest left ventricular global function is moderately reduced.   When compared to the resting ejection fraction (31%), the stress ejection fraction (36%) has increased.   The study quality is good.    Review of Systems   Constitutional: Positive for malaise/fatigue.   Cardiovascular:  Negative for chest pain, irregular heartbeat and palpitations.   Respiratory:  Negative for cough and shortness of breath.    All other systems reviewed and are negative.    Objective:     Vital Signs (Most Recent):  Temp: 97.3 °F (36.3 °C) (10/07/22 0400)  Pulse: 64 (10/07/22 0600)  Resp: 19 (10/07/22 0600)  BP: 104/71 (10/07/22 0600)  SpO2: (!) 94 % (10/07/22 0600)   Vital Signs (24h Range):  Temp:  [97 °F (36.1 °C)-98.5 °F (36.9 °C)] 97.3 °F (36.3 °C)  Pulse:  [54-65] 64  Resp:  [14-29] 19  SpO2:  [88 %-96 %] 94 %  BP: ()/(42-73) 104/71     Weight: 63.9 kg (140 lb 14 oz)  Body mass index is 24.95 kg/m².    SpO2: (!) 94 %  O2 Device (Oxygen Therapy): nasal cannula      Intake/Output Summary (Last 24 hours) at 10/7/2022 0840  Last data filed at 10/7/2022 0600  Gross per 24 hour   Intake --   Output 2400 ml   Net -2400 ml       Lines/Drains/Airways       Peripheral Intravenous Line  Duration                  Midline Catheter Insertion/Assessment  - Single Lumen 10/05/22 0839 Right basilic vein (medial side of arm) other (see comments) 2 days         Peripheral IV - Single Lumen 10/05/22 0632 22 G Anterior;Left Wrist 2 days                  Significant Labs:   Recent Results (from the past 72 hour(s))   Magnesium    Collection Time: 10/05/22  1:02 AM   Result Value Ref Range    Magnesium  Level 1.90 1.60 - 2.60 mg/dL   Comprehensive Metabolic Panel    Collection Time: 10/05/22  1:02 AM   Result Value Ref Range    Sodium Level 138 136 - 145 mmol/L    Potassium Level 3.6 3.5 - 5.1 mmol/L    Chloride 102 98 - 107 mmol/L    Carbon Dioxide 27 23 - 31 mmol/L    Glucose Level 91 82 - 115 mg/dL    Blood Urea Nitrogen 13.0 9.8 - 20.1 mg/dL    Creatinine 0.66 0.55 - 1.02 mg/dL    Calcium Level Total 8.3 (L) 8.4 - 10.2 mg/dL    Protein Total 5.6 (L) 5.8 - 7.6 gm/dL    Albumin Level 2.7 (L) 3.4 - 4.8 gm/dL    Globulin 2.9 2.4 - 3.5 gm/dL    Albumin/Globulin Ratio 0.9 (L) 1.1 - 2.0 ratio    Bilirubin Total 0.5 <=1.5 mg/dL    Alkaline Phosphatase 167 (H) 40 - 150 unit/L    Alanine Aminotransferase 47 0 - 55 unit/L    Aspartate Aminotransferase 55 (H) 5 - 34 unit/L    eGFR >60 mls/min/1.73/m2   CBC with Differential    Collection Time: 10/05/22  1:02 AM   Result Value Ref Range    WBC 10.5 4.5 - 11.5 x10(3)/mcL    RBC 4.51 4.20 - 5.40 x10(6)/mcL    Hgb 14.0 12.0 - 16.0 gm/dL    Hct 41.2 37.0 - 47.0 %    MCV 91.4 80.0 - 94.0 fL    MCH 31.0 27.0 - 31.0 pg    MCHC 34.0 33.0 - 36.0 mg/dL    RDW 13.7 11.5 - 17.0 %    Platelet 199 130 - 400 x10(3)/mcL    MPV 10.0 7.4 - 10.4 fL    Neut % 64.4 %    Lymph % 19.7 %    Mono % 13.0 %    Eos % 1.1 %    Basophil % 0.7 %    Lymph # 2.06 0.6 - 4.6 x10(3)/mcL    Neut # 6.8 2.1 - 9.2 x10(3)/mcL    Mono # 1.36 (H) 0.1 - 1.3 x10(3)/mcL    Eos # 0.12 0 - 0.9 x10(3)/mcL    Baso # 0.07 0 - 0.2 x10(3)/mcL    IG# 0.11 (H) 0 - 0.04 x10(3)/mcL    IG% 1.1 %    NRBC% 0.0 %   Basic Metabolic Panel    Collection Time: 10/06/22  1:20 AM   Result Value Ref Range    Sodium Level 138 136 - 145 mmol/L    Potassium Level 3.2 (L) 3.5 - 5.1 mmol/L    Chloride 100 98 - 107 mmol/L    Carbon Dioxide 30 23 - 31 mmol/L    Glucose Level 104 82 - 115 mg/dL    Blood Urea Nitrogen 18.9 9.8 - 20.1 mg/dL    Creatinine 0.70 0.55 - 1.02 mg/dL    BUN/Creatinine Ratio 27     Calcium Level Total 8.7 8.4 - 10.2 mg/dL     Anion Gap 8.0 mEq/L    eGFR >60 mls/min/1.73/m2   Magnesium    Collection Time: 10/06/22  1:20 AM   Result Value Ref Range    Magnesium Level 1.90 1.60 - 2.60 mg/dL   CBC with Differential    Collection Time: 10/06/22  1:20 AM   Result Value Ref Range    WBC 9.6 4.5 - 11.5 x10(3)/mcL    RBC 4.40 4.20 - 5.40 x10(6)/mcL    Hgb 13.4 12.0 - 16.0 gm/dL    Hct 40.5 37.0 - 47.0 %    MCV 92.0 80.0 - 94.0 fL    MCH 30.5 27.0 - 31.0 pg    MCHC 33.1 33.0 - 36.0 mg/dL    RDW 13.7 11.5 - 17.0 %    Platelet 259 130 - 400 x10(3)/mcL    MPV 10.0 7.4 - 10.4 fL    Neut % 68.8 %    Lymph % 14.7 %    Mono % 14.0 %    Eos % 1.4 %    Basophil % 0.4 %    Lymph # 1.41 0.6 - 4.6 x10(3)/mcL    Neut # 6.6 2.1 - 9.2 x10(3)/mcL    Mono # 1.35 (H) 0.1 - 1.3 x10(3)/mcL    Eos # 0.13 0 - 0.9 x10(3)/mcL    Baso # 0.04 0 - 0.2 x10(3)/mcL    IG# 0.07 (H) 0 - 0.04 x10(3)/mcL    IG% 0.7 %    NRBC% 0.0 %   Echo    Collection Time: 10/06/22 12:15 PM   Result Value Ref Range    BSA 1.7 m2    EF 25 %    LVIDd 4.53 3.5 - 6.0 cm    IVS 1.37 (A) 0.6 - 1.1 cm    Posterior Wall 1.58 (A) 0.6 - 1.1 cm    LVIDs 3.83 2.1 - 4.0 cm    FS 15 28 - 44 %    LV mass 271.46 g    RVDD 2.73 cm    Left Ventricle Relative Wall Thickness 0.70 cm    LV Systolic Volume 63.10 mL    LV Systolic Volume Index 37.6 mL/m2    LV Diastolic Volume 93.90 mL    LV Diastolic Volume Index 55.89 mL/m2    LV Mass Index 162 g/m2   Basic Metabolic Panel    Collection Time: 10/07/22  1:32 AM   Result Value Ref Range    Sodium Level 138 136 - 145 mmol/L    Potassium Level 3.5 3.5 - 5.1 mmol/L    Chloride 99 98 - 107 mmol/L    Carbon Dioxide 31 23 - 31 mmol/L    Glucose Level 93 82 - 115 mg/dL    Blood Urea Nitrogen 18.8 9.8 - 20.1 mg/dL    Creatinine 0.62 0.55 - 1.02 mg/dL    BUN/Creatinine Ratio 30     Calcium Level Total 8.3 (L) 8.4 - 10.2 mg/dL    Anion Gap 8.0 mEq/L    eGFR >60 mls/min/1.73/m2   Magnesium    Collection Time: 10/07/22  1:32 AM   Result Value Ref Range    Magnesium Level 1.80  1.60 - 2.60 mg/dL   CBC with Differential    Collection Time: 10/07/22  1:32 AM   Result Value Ref Range    WBC 8.8 4.5 - 11.5 x10(3)/mcL    RBC 4.26 4.20 - 5.40 x10(6)/mcL    Hgb 13.2 12.0 - 16.0 gm/dL    Hct 39.2 37.0 - 47.0 %    MCV 92.0 80.0 - 94.0 fL    MCH 31.0 27.0 - 31.0 pg    MCHC 33.7 33.0 - 36.0 mg/dL    RDW 13.6 11.5 - 17.0 %    Platelet 267 130 - 400 x10(3)/mcL    MPV 9.7 7.4 - 10.4 fL    Neut % 61.7 %    Lymph % 21.9 %    Mono % 13.4 %    Eos % 1.8 %    Basophil % 0.5 %    Lymph # 1.92 0.6 - 4.6 x10(3)/mcL    Neut # 5.4 2.1 - 9.2 x10(3)/mcL    Mono # 1.17 0.1 - 1.3 x10(3)/mcL    Eos # 0.16 0 - 0.9 x10(3)/mcL    Baso # 0.04 0 - 0.2 x10(3)/mcL    IG# 0.06 (H) 0 - 0.04 x10(3)/mcL    IG% 0.7 %    NRBC% 0.0 %     Telemetry: PAF/RVR    Physical Exam  Constitutional:       Appearance: Normal appearance.   HENT:      Head: Normocephalic.      Mouth/Throat:      Mouth: Mucous membranes are moist.   Eyes:      Extraocular Movements: Extraocular movements intact.   Cardiovascular:      Rate and Rhythm: Regular rhythm. Bradycardia present.      Pulses: Normal pulses.   Pulmonary:      Effort: Pulmonary effort is normal.      Breath sounds: Decreased breath sounds present.   Abdominal:      Palpations: Abdomen is soft.   Musculoskeletal:         General: Normal range of motion.   Skin:     General: Skin is warm and dry.      Capillary Refill: Capillary refill takes less than 2 seconds.      Comments: Midline Sternotomy SIA C/D/I   Neurological:      General: No focal deficit present.      Mental Status: She is alert and oriented to person, place, and time.   Psychiatric:         Mood and Affect: Mood normal.         Behavior: Behavior normal.     Current Inpatient Medications:    Current Facility-Administered Medications:     0.9%  NaCl infusion (for blood administration), , Intravenous, Q24H PRN, Wing Edmonds IV, MD    albumin human 5% bottle 12.5 g, 12.5 g, Intravenous, PRN, ROSANGELA Alfonso, Stopped at  09/29/22 0258    [START ON 10/9/2022] amiodarone tablet 200 mg, 200 mg, Oral, Daily, LOREN Gore    [START ON 10/12/2022] amiodarone tablet 200 mg, 200 mg, Oral, Daily, FRANKLYN GoreP    amiodarone tablet 400 mg, 400 mg, Oral, BID, FRANKLYN GoreP, 400 mg at 10/07/22 0819    apixaban tablet 5 mg, 5 mg, Oral, BID, FRANKLYN GoreP, 5 mg at 10/07/22 0819    calcium gluconate 1 g in NS IVPB (premixed), 1 g, Intravenous, PRN, ROSANGELA Alfonso    calcium gluconate 1 g in NS IVPB (premixed), 2 g, Intravenous, PRN, ROSANGELA Alfonso    calcium gluconate 1 g in NS IVPB (premixed), 3 g, Intravenous, PRN, ROSANGELA Alfonso    dextrose 10% bolus 125 mL, 12.5 g, Intravenous, PRN, Wing Edmonds IV, MD    dextrose 10% bolus 250 mL, 25 g, Intravenous, PRN, Wing Edmonds IV, MD    docusate sodium capsule 100 mg, 100 mg, Oral, BID, ROSANGELA Alfonso, 100 mg at 10/07/22 0819    famotidine (PF) injection 20 mg, 20 mg, Intravenous, Daily, ROSANGELA Alfonso, 20 mg at 10/07/22 0819    folic acid tablet 1 mg, 1 mg, Oral, Daily, ROSANGELA Alfonso, 1 mg at 10/07/22 0819    furosemide injection 40 mg, 40 mg, Intravenous, Q12H, Jovanni Liriano, ANP, 40 mg at 10/07/22 0414    HYDROcodone-acetaminophen 5-325 mg per tablet 1 tablet, 1 tablet, Oral, Q4H PRN, ROSANGELA Alfonso, 1 tablet at 10/04/22 2210    lactulose 10 gram/15 ml solution 20 g, 20 g, Oral, Q6H PRN, ROSANGELA Alfonso    loperamide capsule 4 mg, 4 mg, Oral, Once, ROSANGELA Alfonso    magnesium sulfate 2g in water 50mL IVPB (premix), 2 g, Intravenous, PRN, ROSANGELA Alfonso    magnesium sulfate 2g in water 50mL IVPB (premix), 4 g, Intravenous, PRN, ROSANGELA Alfonso    metoclopramide HCl injection 5 mg, 5 mg, Intravenous, Q6H PRN, ROSANGELA Alfonso    metoprolol tartrate (LOPRESSOR) tablet 50 mg, 50 mg, Oral, TID, Jovanni Liriano, ANP, 50 mg at 10/07/22 0819    morphine injection 2 mg, 2 mg, Intravenous, PRN, ROSANGELA Alfonso    ondansetron injection 8 mg, 8 mg, Intravenous, Q8H PRN,  ROSANGELA Alfonso, 8 mg at 10/06/22 2300    potassium chloride 20 mEq in 100 mL IVPB (FOR CENTRAL LINE ADMINISTRATION ONLY), 20 mEq, Intravenous, PRN, ROSANGELA Alfonso, Last Rate: 50 mL/hr at 10/05/22 0800, Rate Verify at 10/05/22 0800    potassium chloride 20 mEq in 100 mL IVPB (FOR CENTRAL LINE ADMINISTRATION ONLY), 40 mEq, Intravenous, PRN, ROSANGELA Alfonso, Last Rate: 25 mL/hr at 10/05/22 0800, Rate Verify at 10/05/22 0800    sodium chloride 0.9% flush 10 mL, 10 mL, Intravenous, PRN, Ciro Rg MD    Flushing PICC Protocol, , , Until Discontinued **AND** sodium chloride 0.9% flush 10 mL, 10 mL, Intravenous, Q6H, 10 mL at 10/07/22 0600 **AND** sodium chloride 0.9% flush 10 mL, 10 mL, Intravenous, PRN, Yun Rivera NP    sodium chloride 0.9% flush 2 mL, 2 mL, Intravenous, PRN, ROSANGELA Alfonso    sodium phosphate 15 mmol in dextrose 5 % 250 mL IVPB, 15 mmol, Intravenous, PRN, Eliseo Pavonz PA    sodium phosphate 20.01 mmol in dextrose 5 % 250 mL IVPB, 20.01 mmol, Intravenous, PRN, Eliseo Lentz, PA    sodium phosphate 30 mmol in dextrose 5 % 250 mL IVPB, 30 mmol, Intravenous, PRN, Eliseo Pavonz, PA    sucralfate tablet 1 g, 1 g, Oral, QID (AC & HS), ROSANGELA Alfonso, 1 g at 10/07/22 0645    VTE Risk Mitigation (From admission, onward)           Ordered     apixaban tablet 5 mg  2 times daily         10/06/22 1014     Place sequential compression device  Until discontinued         09/28/22 1021     IP VTE HIGH RISK PATIENT  Once         09/28/22 1010                  Assessment:   VHD (Mod AR, Mild MI/MR)    - s/p (9.28.22) - AVR 25mm Mosaic Porcine Valve  AAA    - s/p (9.28.22) - AAA Resection and Placement of 32mm Interposition Graft  PAF with RVR    - CHADsVASc - 5 Points - 7.2% Stroke Risk per Year     - s/p BOB Ligation (9.28.22) with Endostapler  Acute on Chronic Systolic HF/EF 20-25%  Hx of Hypothyroidism  HTN  HLD  No Hx of GIB    Plan:   Continue Amio oral load: amio 400 mg BID x 3 days, then amio 200 mg BID x 3  days, then 200 mg daily.  Continue BB - will transition to Toprol XL 100mg daily  Start Entresto 24/26mg BID with hold parameters  Order LifeVest  No ASA Secondary to Allergy  Continue Short term Eliquis 5 mg po bid for 30 days only.   BOB is completely ligated, no need for long term OAC.  DC IV Lasix, start Lasix 40mg daily  Plans for discharge to rehab when accepted.  Stable for DC cardio standpoint  F/U with Dr. Mason in 7-10 days  Will sign off. Thank you for allowing us to participate in the care of this patient. Please call us with any questions.      Beto Valencia, ANNA-BC  Cardiology  [facility] - [facility]  10/07/2022

## 2022-10-07 NOTE — CARE UPDATE
[id number] spoke with therapy re rehab vs HH. Therapy reports if pt gets her life vest today and is able to go to rehab today than pt should go to rehab. If pt stays over the weekend then she may go home with HH if she does well over the weekend. Informed Melissa ZHENG

## 2022-10-07 NOTE — PT/OT/SLP PROGRESS
Physical Therapy Treatment    Patient Name:  Sydney Bacon   MRN:  [id number]    Recommendations:     Discharge Recommendations:  rehabilitation facility   Discharge Equipment Recommendations: walker, rolling   Barriers to discharge:       Assessment:     Sydney Bacon is a 76 y.o. female admitted with a medical diagnosis of AVR, AAA resection, post op Afib RVR  She presents with the following impairments/functional limitations:  weakness, gait instability. Pt very motivated with increase endurance this session.     Rehab Prognosis: Good; patient would benefit from acute skilled PT services to address these deficits and reach maximum level of function.    Recent Surgery: Procedure(s) (LRB):  REPLACEMENT, AORTIC VALVE (N/A)  REPAIR, AORTA, ASCENDING (N/A) 9 Days Post-Op    Plan:     During this hospitalization, patient to be seen 5 x/week to address the identified rehab impairments via gait training, therapeutic activities, therapeutic exercises and progress toward the following goals:    Plan of Care Expires:  10/30/22    Subjective     Chief Complaint:   Patient/Family Comments/goals:   Pain/Comfort:  Pain Rating 1: 0/10      Objective:     Communicated with NSG prior to session.  Patient found up in chair with blood pressure cuff, telemetry, pulse ox (continuous) upon PTA entry to room.     General Precautions: Standard, sternal   Orthopedic Precautions:N/A   Braces: N/A  Respiratory Status:  Pt started on 2 L/NC and progress to no oxygen. SPO2 ranged between 98% - 92 % while ambulating without oxygen.      Functional Mobility:  T/F: Bay with RW (sternal pxn maintained)  Gait: Pt amb for approximately 200 ft x 2 walks, step through gait pattern and slow jessi, no LOB. Pt attempted to take a few steps without AD, had a LOB needing Bay to correct.  Bed: Bay supine to sit (sternal pxn maintained)        Patient left supine with all lines intact and call button in reach..    GOALS:   Multidisciplinary Problems        Physical Therapy Goals          Problem: Physical Therapy    Goal Priority Disciplines Outcome Goal Variances Interventions   Physical Therapy Goal     PT, PT/OT Ongoing, Progressing     Description: Goals to be met by: 10/30/2022     Patient will increase functional independence with mobility by performin. Supine to sit with Stand-by Assistance  2. Sit to stand transfer with Stand-by Assistance  3. Gait  x 200 feet with Stand-by Assistance using Rolling Walker.                          Time Tracking:     PT Received On:    PT Start Time: 930     PT Stop Time: 953  PT Total Time (min): 23 min     Billable Minutes: Gait Training 23    Treatment Type: Treatment  PT/PTA: PTA     PTA Visit Number: 2     10/07/2022

## 2022-10-08 PROCEDURE — 21400001 HC TELEMETRY ROOM

## 2022-10-08 PROCEDURE — 63600175 PHARM REV CODE 636 W HCPCS: Performed by: NURSE PRACTITIONER

## 2022-10-08 PROCEDURE — A4216 STERILE WATER/SALINE, 10 ML: HCPCS | Performed by: NURSE PRACTITIONER

## 2022-10-08 PROCEDURE — 25000003 PHARM REV CODE 250: Performed by: NURSE PRACTITIONER

## 2022-10-08 PROCEDURE — 25000003 PHARM REV CODE 250

## 2022-10-08 PROCEDURE — 25000003 PHARM REV CODE 250: Performed by: SPECIALIST

## 2022-10-08 PROCEDURE — 94760 N-INVAS EAR/PLS OXIMETRY 1: CPT

## 2022-10-08 PROCEDURE — 97110 THERAPEUTIC EXERCISES: CPT

## 2022-10-08 PROCEDURE — 99024 PR POST-OP FOLLOW-UP VISIT: ICD-10-PCS | Mod: POP,,,

## 2022-10-08 PROCEDURE — 99024 POSTOP FOLLOW-UP VISIT: CPT | Mod: POP,,,

## 2022-10-08 RX ORDER — BISACODYL 10 MG
10 SUPPOSITORY, RECTAL RECTAL DAILY PRN
Status: DISCONTINUED | OUTPATIENT
Start: 2022-10-08 | End: 2022-10-12 | Stop reason: HOSPADM

## 2022-10-08 RX ADMIN — SUCRALFATE 1 G: 1 TABLET ORAL at 08:10

## 2022-10-08 RX ADMIN — FUROSEMIDE 40 MG: 10 INJECTION, SOLUTION INTRAMUSCULAR; INTRAVENOUS at 05:10

## 2022-10-08 RX ADMIN — FOLIC ACID 1 MG: 1 TABLET ORAL at 08:10

## 2022-10-08 RX ADMIN — AMIODARONE HYDROCHLORIDE 400 MG: 200 TABLET ORAL at 08:10

## 2022-10-08 RX ADMIN — SODIUM CHLORIDE, PRESERVATIVE FREE 10 ML: 5 INJECTION INTRAVENOUS at 06:10

## 2022-10-08 RX ADMIN — DOCUSATE SODIUM 100 MG: 100 CAPSULE, LIQUID FILLED ORAL at 08:10

## 2022-10-08 RX ADMIN — SUCRALFATE 1 G: 1 TABLET ORAL at 05:10

## 2022-10-08 RX ADMIN — SODIUM CHLORIDE, PRESERVATIVE FREE 10 ML: 5 INJECTION INTRAVENOUS at 12:10

## 2022-10-08 RX ADMIN — APIXABAN 5 MG: 5 TABLET, FILM COATED ORAL at 08:10

## 2022-10-08 RX ADMIN — FAMOTIDINE 20 MG: 10 INJECTION INTRAVENOUS at 08:10

## 2022-10-08 RX ADMIN — SACUBITRIL AND VALSARTAN 1 TABLET: 24; 26 TABLET, FILM COATED ORAL at 08:10

## 2022-10-08 RX ADMIN — SODIUM CHLORIDE, PRESERVATIVE FREE 10 ML: 5 INJECTION INTRAVENOUS at 05:10

## 2022-10-08 RX ADMIN — METOPROLOL SUCCINATE 100 MG: 50 TABLET, EXTENDED RELEASE ORAL at 08:10

## 2022-10-08 RX ADMIN — POLYETHYLENE GLYCOL 3350 17 G: 17 POWDER, FOR SOLUTION ORAL at 08:10

## 2022-10-08 RX ADMIN — BISACODYL 10 MG: 10 SUPPOSITORY RECTAL at 05:10

## 2022-10-08 RX ADMIN — SUCRALFATE 1 G: 1 TABLET ORAL at 06:10

## 2022-10-08 RX ADMIN — FUROSEMIDE 40 MG: 10 INJECTION, SOLUTION INTRAMUSCULAR; INTRAVENOUS at 03:10

## 2022-10-08 NOTE — PROGRESS NOTES
CT SURGERY PROGRESS NOTE  Sydney Bacon  76 y.o.  1945    Patients Procedure: Procedure(s) (LRB):  REPLACEMENT, AORTIC VALVE (N/A)  REPAIR, AORTA, ASCENDING (N/A)    Subjective  Interval History: NAEO in NSR. Patient with no complaints. Being fitted for lifevest and waiting on rehab.    Review of Systems   Constitutional: Negative.    Respiratory:  Negative for cough, sputum production and shortness of breath.    Cardiovascular:  Negative for chest pain, palpitations, claudication and leg swelling.   Gastrointestinal:  Negative for abdominal pain, nausea and vomiting.   Genitourinary: Negative.    Skin: Negative.         Incision C/D/I     Medication List  Infusions      Scheduled   [START ON 10/9/2022] amiodarone  200 mg Oral Daily    [START ON 10/12/2022] amiodarone  200 mg Oral Daily    amiodarone  400 mg Oral BID    apixaban  5 mg Oral BID    docusate sodium  100 mg Oral BID    famotidine (PF)  20 mg Intravenous Daily    folic acid  1 mg Oral Daily    furosemide (LASIX) injection  40 mg Intravenous Q12H    loperamide  4 mg Oral Once    metoprolol succinate  100 mg Oral Daily    sacubitriL-valsartan  1 tablet Oral BID    sodium chloride 0.9%  10 mL Intravenous Q6H    sucralfate  1 g Oral QID (AC & HS)       Objective:  Recent Vitals:  Temp:  [97.3 °F (36.3 °C)-98.4 °F (36.9 °C)] 98 °F (36.7 °C)  Pulse:  [60-73] 72  Resp:  [18-20] 18  SpO2:  [90 %-96 %] 95 %  BP: ()/(58-70) 103/70    Physical Exam  Vitals and nursing note reviewed.   Constitutional:       General: She is awake. She is not in acute distress.     Appearance: Normal appearance. She is not toxic-appearing or diaphoretic.   HENT:      Head: Normocephalic and atraumatic.   Eyes:      Extraocular Movements: Extraocular movements intact.      Pupils: Pupils are equal, round, and reactive to light.   Cardiovascular:      Rate and Rhythm: Normal rate and regular rhythm.      Pulses: Normal pulses.           Radial pulses are 2+ on the right  side and 2+ on the left side.        Dorsalis pedis pulses are 2+ on the right side and 2+ on the left side.      Heart sounds: Normal heart sounds.   Pulmonary:      Effort: Pulmonary effort is normal.      Breath sounds: Normal breath sounds.   Musculoskeletal:      Right lower leg: No edema.      Left lower leg: No edema.   Skin:     General: Skin is warm and dry.      Comments: INCISION C/D/I   Neurological:      General: No focal deficit present.      Mental Status: She is alert and oriented to person, place, and time.   Psychiatric:         Mood and Affect: Mood and affect normal.        I/O last 24 hrs:  Intake/Output - Last 3 Shifts         10/06 0700  10/07 0659 10/07 0700  10/08 0659 10/08 0700  10/09 0659    P.O.  1320     I.V. (mL/kg)       IV Piggyback       Total Intake(mL/kg)  1320 (20.7)     Urine (mL/kg/hr) 2700 (1.8) 500 (0.3)     Stool 0 0     Total Output 2700 500     Net -2700 +820            Urine Occurrence  1 x     Stool Occurrence 0 x 1 x             Labs  BMP:   Recent Labs   Lab 10/07/22  0132      K 3.5   CO2 31   BUN 18.8   CREATININE 0.62   CALCIUM 8.3*   MG 1.80       CBC:   Recent Labs   Lab 10/07/22  0132   WBC 8.8   RBC 4.26   HGB 13.2   HCT 39.2      MCV 92.0   MCH 31.0   MCHC 33.7       All pertinent labs from the last 24 hours have been reviewed.      Imaging:   CXR: X-Ray Chest AP Portable    Result Date: 9/29/2022  Mild bibasilar atelectasis with possible small pleural effusions. Electronically signed by: Ivan Chan Date:    09/29/2022 Time:    07:20   I have reviewed all pertinent imaging results/findings within the past 24 hours.        ASSESSMENT/PLAN:  - cardiology recs appreciated-  life vest fitting  - IS/OOB/Ambulate  - PT/OT  - rehab dc planning    Case and plan of care discussed with MD Eliseo Lentz PA-C

## 2022-10-09 PROCEDURE — 63600175 PHARM REV CODE 636 W HCPCS: Performed by: NURSE PRACTITIONER

## 2022-10-09 PROCEDURE — 25000003 PHARM REV CODE 250

## 2022-10-09 PROCEDURE — 97110 THERAPEUTIC EXERCISES: CPT

## 2022-10-09 PROCEDURE — 21400001 HC TELEMETRY ROOM

## 2022-10-09 PROCEDURE — 25000003 PHARM REV CODE 250: Performed by: NURSE PRACTITIONER

## 2022-10-09 PROCEDURE — 94760 N-INVAS EAR/PLS OXIMETRY 1: CPT

## 2022-10-09 PROCEDURE — A4216 STERILE WATER/SALINE, 10 ML: HCPCS | Performed by: NURSE PRACTITIONER

## 2022-10-09 PROCEDURE — 99024 POSTOP FOLLOW-UP VISIT: CPT | Mod: POP,,,

## 2022-10-09 PROCEDURE — 25000003 PHARM REV CODE 250: Performed by: SPECIALIST

## 2022-10-09 PROCEDURE — 99024 PR POST-OP FOLLOW-UP VISIT: ICD-10-PCS | Mod: POP,,,

## 2022-10-09 RX ORDER — SENNOSIDES 8.6 MG/1
2 TABLET ORAL DAILY PRN
Status: DISCONTINUED | OUTPATIENT
Start: 2022-10-09 | End: 2022-10-12 | Stop reason: HOSPADM

## 2022-10-09 RX ORDER — AMOXICILLIN 250 MG
2 CAPSULE ORAL ONCE
Status: DISCONTINUED | OUTPATIENT
Start: 2022-10-09 | End: 2022-10-12

## 2022-10-09 RX ORDER — ACETAMINOPHEN 325 MG/1
650 TABLET ORAL EVERY 4 HOURS PRN
Status: DISCONTINUED | OUTPATIENT
Start: 2022-10-10 | End: 2022-10-12 | Stop reason: HOSPADM

## 2022-10-09 RX ADMIN — APIXABAN 5 MG: 5 TABLET, FILM COATED ORAL at 08:10

## 2022-10-09 RX ADMIN — FAMOTIDINE 20 MG: 10 INJECTION INTRAVENOUS at 08:10

## 2022-10-09 RX ADMIN — AMIODARONE HYDROCHLORIDE 200 MG: 200 TABLET ORAL at 08:10

## 2022-10-09 RX ADMIN — FOLIC ACID 1 MG: 1 TABLET ORAL at 08:10

## 2022-10-09 RX ADMIN — SACUBITRIL AND VALSARTAN 1 TABLET: 24; 26 TABLET, FILM COATED ORAL at 08:10

## 2022-10-09 RX ADMIN — SUCRALFATE 1 G: 1 TABLET ORAL at 08:10

## 2022-10-09 RX ADMIN — FUROSEMIDE 40 MG: 10 INJECTION, SOLUTION INTRAMUSCULAR; INTRAVENOUS at 04:10

## 2022-10-09 RX ADMIN — SUCRALFATE 1 G: 1 TABLET ORAL at 03:10

## 2022-10-09 RX ADMIN — SUCRALFATE 1 G: 1 TABLET ORAL at 05:10

## 2022-10-09 RX ADMIN — SODIUM CHLORIDE, PRESERVATIVE FREE 10 ML: 5 INJECTION INTRAVENOUS at 05:10

## 2022-10-09 RX ADMIN — SODIUM CHLORIDE, PRESERVATIVE FREE 10 ML: 5 INJECTION INTRAVENOUS at 11:10

## 2022-10-09 RX ADMIN — DOCUSATE SODIUM 100 MG: 100 CAPSULE, LIQUID FILLED ORAL at 08:10

## 2022-10-09 RX ADMIN — SUCRALFATE 1 G: 1 TABLET ORAL at 11:10

## 2022-10-09 RX ADMIN — SODIUM CHLORIDE, PRESERVATIVE FREE 10 ML: 5 INJECTION INTRAVENOUS at 07:10

## 2022-10-09 RX ADMIN — HYDROCODONE BITARTRATE AND ACETAMINOPHEN 1 TABLET: 5; 325 TABLET ORAL at 08:10

## 2022-10-09 RX ADMIN — SODIUM CHLORIDE, PRESERVATIVE FREE 10 ML: 5 INJECTION INTRAVENOUS at 12:10

## 2022-10-09 RX ADMIN — SENNOSIDES 2 TABLET: 8.6 TABLET, FILM COATED ORAL at 11:10

## 2022-10-09 RX ADMIN — METOPROLOL SUCCINATE 100 MG: 50 TABLET, EXTENDED RELEASE ORAL at 08:10

## 2022-10-09 RX ADMIN — FUROSEMIDE 40 MG: 10 INJECTION, SOLUTION INTRAMUSCULAR; INTRAVENOUS at 03:10

## 2022-10-09 NOTE — PROGRESS NOTES
10/08/22 0845   Pre Exercise Vitals   /78   Pulse 80   Supplemental O2? No   SpO2 96 %   During Exercise Vitals   Pulse 90   Supplemental O2? No   SpO2 94 %   Distance Walked 100 feet   Post Exercise Vitals   /87   Pulse 78   Supplemental O2? No   SpO2 94 %   Modality   Modality Walker   Minimal assist to stand; maintained sternal precautions; demonstrated IS @500; ambulated 100 feet on room air, O2 sat @94%

## 2022-10-09 NOTE — PROGRESS NOTES
10/09/22 0900   Pre Exercise Vitals   /56   Pulse 64   Supplemental O2? No   SpO2 93 %   During Exercise Vitals   Pulse 76   Supplemental O2? No   SpO2 (!) 88 %  (02 sat 88-92% during ambulation)   Post Exercise Vitals   /82   Pulse 71   Supplemental O2? No   SpO2 94 %   Modality   Modality   (rollator)   Communicated with nurse prior to activity.  Min assist. Sternal precautions maintained. Fatigued today.  02 sat decreased with activity, bilat crackles noted--dicussed with nurse.  Encouraged increased activity and use of IS.

## 2022-10-09 NOTE — PROGRESS NOTES
CT SURGERY PROGRESS NOTE  Sydney Bacon  76 y.o.  1945    Patients Procedure: Procedure(s) (LRB):  REPLACEMENT, AORTIC VALVE (N/A)  REPAIR, AORTA, ASCENDING (N/A)    Subjective  Interval History: NAEO. Patient with no complaints. Being fitted for lifevest before rehab dc beginning of this week.    Review of Systems   Constitutional: Negative.    Respiratory:  Negative for cough, sputum production and shortness of breath.    Cardiovascular:  Negative for chest pain, palpitations, claudication and leg swelling.   Gastrointestinal:  Negative for abdominal pain, nausea and vomiting.   Genitourinary: Negative.    Skin: Negative.         Incision C/D/I     Medication List  Infusions      Scheduled   amiodarone  200 mg Oral Daily    [START ON 10/12/2022] amiodarone  200 mg Oral Daily    amiodarone  400 mg Oral BID    apixaban  5 mg Oral BID    docusate sodium  100 mg Oral BID    famotidine (PF)  20 mg Intravenous Daily    folic acid  1 mg Oral Daily    furosemide (LASIX) injection  40 mg Intravenous Q12H    loperamide  4 mg Oral Once    metoprolol succinate  100 mg Oral Daily    sacubitriL-valsartan  1 tablet Oral BID    senna-docusate 8.6-50 mg  2 tablet Oral Once    sodium chloride 0.9%  10 mL Intravenous Q6H    sucralfate  1 g Oral QID (AC & HS)       Objective:  Recent Vitals:  Temp:  [97.9 °F (36.6 °C)-98.7 °F (37.1 °C)] 98 °F (36.7 °C)  Pulse:  [59-71] 68  Resp:  [17-20] 17  SpO2:  [91 %-93 %] 93 %  BP: ()/(54-71) 115/69    Physical Exam  Vitals and nursing note reviewed.   Constitutional:       General: She is awake. She is not in acute distress.     Appearance: Normal appearance. She is not toxic-appearing or diaphoretic.   HENT:      Head: Normocephalic and atraumatic.   Eyes:      Extraocular Movements: Extraocular movements intact.      Pupils: Pupils are equal, round, and reactive to light.   Cardiovascular:      Rate and Rhythm: Normal rate and regular rhythm.      Pulses: Normal pulses.            Radial pulses are 2+ on the right side and 2+ on the left side.        Dorsalis pedis pulses are 2+ on the right side and 2+ on the left side.      Heart sounds: Normal heart sounds.   Pulmonary:      Effort: Pulmonary effort is normal.      Breath sounds: Normal breath sounds.   Musculoskeletal:      Right lower leg: No edema.      Left lower leg: No edema.   Skin:     General: Skin is warm and dry.      Comments: INCISION C/D/I   Neurological:      General: No focal deficit present.      Mental Status: She is alert and oriented to person, place, and time.   Psychiatric:         Mood and Affect: Mood and affect normal.        I/O last 24 hrs:  Intake/Output - Last 3 Shifts         10/07 0700  10/08 0659 10/08 0700  10/09 0659 10/09 0700  10/10 0659    P.O. 1320 720     Total Intake(mL/kg) 1320 (20.7) 720 (11.4)     Urine (mL/kg/hr) 500 (0.3) 150 (0.1)     Stool 0 0     Total Output 500 150     Net +820 +570            Urine Occurrence 1 x 2 x     Stool Occurrence 1 x 2 x             Labs  BMP:   No results for input(s): GLU, NA, K, CL, CO2, BUN, CREATININE, CALCIUM, MG in the last 48 hours.    CBC:   No results for input(s): WBC, RBC, HGB, HCT, PLT, MCV, MCH, MCHC in the last 48 hours.    All pertinent labs from the last 24 hours have been reviewed.      Imaging:   CXR: X-Ray Chest AP Portable    Result Date: 9/29/2022  Mild bibasilar atelectasis with possible small pleural effusions. Electronically signed by: Ivan Chan Date:    09/29/2022 Time:    07:20   I have reviewed all pertinent imaging results/findings within the past 24 hours.        ASSESSMENT/PLAN:  - cardiology recs appreciated -  life vest fitting  - IS/OOB/Ambulate  - PT/OT  - rehab dc planning    Case and plan of care discussed with MD Eliseo Lentz PA-C

## 2022-10-09 NOTE — PLAN OF CARE
Left Message for Doreen with [facility] to see if Lifevest was initiated and notified that would like patient fitted today to discharge tomorrow

## 2022-10-10 LAB
ALBUMIN SERPL-MCNC: 3.2 GM/DL (ref 3.4–4.8)
ALBUMIN/GLOB SERPL: 0.9 RATIO (ref 1.1–2)
ALP SERPL-CCNC: 132 UNIT/L (ref 40–150)
ALT SERPL-CCNC: 20 UNIT/L (ref 0–55)
AST SERPL-CCNC: 26 UNIT/L (ref 5–34)
BASOPHILS # BLD AUTO: 0.04 X10(3)/MCL (ref 0–0.2)
BASOPHILS NFR BLD AUTO: 0.3 %
BILIRUBIN DIRECT+TOT PNL SERPL-MCNC: 0.8 MG/DL
BUN SERPL-MCNC: 16.6 MG/DL (ref 9.8–20.1)
CALCIUM SERPL-MCNC: 8.7 MG/DL (ref 8.4–10.2)
CHLORIDE SERPL-SCNC: 96 MMOL/L (ref 98–107)
CO2 SERPL-SCNC: 31 MMOL/L (ref 23–31)
CREAT SERPL-MCNC: 0.77 MG/DL (ref 0.55–1.02)
EOSINOPHIL # BLD AUTO: 0.13 X10(3)/MCL (ref 0–0.9)
EOSINOPHIL NFR BLD AUTO: 1 %
ERYTHROCYTE [DISTWIDTH] IN BLOOD BY AUTOMATED COUNT: 13.8 % (ref 11.5–17)
GFR SERPLBLD CREATININE-BSD FMLA CKD-EPI: >60 MLS/MIN/1.73/M2
GLOBULIN SER-MCNC: 3.4 GM/DL (ref 2.4–3.5)
GLUCOSE SERPL-MCNC: 167 MG/DL (ref 82–115)
HCT VFR BLD AUTO: 42.5 % (ref 37–47)
HGB BLD-MCNC: 14.6 GM/DL (ref 12–16)
IMM GRANULOCYTES # BLD AUTO: 0.09 X10(3)/MCL (ref 0–0.04)
IMM GRANULOCYTES NFR BLD AUTO: 0.7 %
LYMPHOCYTES # BLD AUTO: 2.58 X10(3)/MCL (ref 0.6–4.6)
LYMPHOCYTES NFR BLD AUTO: 20.1 %
MAGNESIUM SERPL-MCNC: 3.6 MG/DL (ref 1.6–2.6)
MCH RBC QN AUTO: 31.1 PG (ref 27–31)
MCHC RBC AUTO-ENTMCNC: 34.4 MG/DL (ref 33–36)
MCV RBC AUTO: 90.6 FL (ref 80–94)
MONOCYTES # BLD AUTO: 1.53 X10(3)/MCL (ref 0.1–1.3)
MONOCYTES NFR BLD AUTO: 11.9 %
NEUTROPHILS # BLD AUTO: 8.5 X10(3)/MCL (ref 2.1–9.2)
NEUTROPHILS NFR BLD AUTO: 66 %
NRBC BLD AUTO-RTO: 0 %
PHOSPHATE SERPL-MCNC: 2.9 MG/DL (ref 2.3–4.7)
PLATELET # BLD AUTO: 342 X10(3)/MCL (ref 130–400)
PMV BLD AUTO: 9.3 FL (ref 7.4–10.4)
POTASSIUM SERPL-SCNC: 2.3 MMOL/L (ref 3.5–5.1)
PROT SERPL-MCNC: 6.6 GM/DL (ref 5.8–7.6)
RBC # BLD AUTO: 4.69 X10(6)/MCL (ref 4.2–5.4)
SODIUM SERPL-SCNC: 138 MMOL/L (ref 136–145)
TROPONIN I SERPL-MCNC: 0.1 NG/ML (ref 0–0.04)
WBC # SPEC AUTO: 12.8 X10(3)/MCL (ref 4.5–11.5)

## 2022-10-10 PROCEDURE — 93010 ELECTROCARDIOGRAM REPORT: CPT | Mod: ,,, | Performed by: INTERNAL MEDICINE

## 2022-10-10 PROCEDURE — 25000003 PHARM REV CODE 250: Performed by: NURSE PRACTITIONER

## 2022-10-10 PROCEDURE — 93005 ELECTROCARDIOGRAM TRACING: CPT

## 2022-10-10 PROCEDURE — 63600175 PHARM REV CODE 636 W HCPCS: Performed by: NURSE PRACTITIONER

## 2022-10-10 PROCEDURE — 99024 PR POST-OP FOLLOW-UP VISIT: ICD-10-PCS | Mod: POP,,,

## 2022-10-10 PROCEDURE — 87070 CULTURE OTHR SPECIMN AEROBIC: CPT | Performed by: SPECIALIST

## 2022-10-10 PROCEDURE — 93010 EKG 12-LEAD: ICD-10-PCS | Mod: ,,, | Performed by: INTERNAL MEDICINE

## 2022-10-10 PROCEDURE — 99024 POSTOP FOLLOW-UP VISIT: CPT | Mod: POP,,,

## 2022-10-10 PROCEDURE — 63600175 PHARM REV CODE 636 W HCPCS: Performed by: PHYSICIAN ASSISTANT

## 2022-10-10 PROCEDURE — 83735 ASSAY OF MAGNESIUM: CPT | Performed by: SPECIALIST

## 2022-10-10 PROCEDURE — 97535 SELF CARE MNGMENT TRAINING: CPT

## 2022-10-10 PROCEDURE — 97116 GAIT TRAINING THERAPY: CPT | Mod: CQ

## 2022-10-10 PROCEDURE — 80053 COMPREHEN METABOLIC PANEL: CPT | Performed by: SPECIALIST

## 2022-10-10 PROCEDURE — 25000003 PHARM REV CODE 250

## 2022-10-10 PROCEDURE — 84484 ASSAY OF TROPONIN QUANT: CPT | Performed by: SPECIALIST

## 2022-10-10 PROCEDURE — 36415 COLL VENOUS BLD VENIPUNCTURE: CPT | Performed by: SPECIALIST

## 2022-10-10 PROCEDURE — 63600175 PHARM REV CODE 636 W HCPCS

## 2022-10-10 PROCEDURE — 20000000 HC ICU ROOM

## 2022-10-10 PROCEDURE — A4216 STERILE WATER/SALINE, 10 ML: HCPCS | Performed by: NURSE PRACTITIONER

## 2022-10-10 PROCEDURE — 85025 COMPLETE CBC W/AUTO DIFF WBC: CPT | Performed by: SPECIALIST

## 2022-10-10 PROCEDURE — 84100 ASSAY OF PHOSPHORUS: CPT | Performed by: SPECIALIST

## 2022-10-10 PROCEDURE — 63600175 PHARM REV CODE 636 W HCPCS: Performed by: STUDENT IN AN ORGANIZED HEALTH CARE EDUCATION/TRAINING PROGRAM

## 2022-10-10 RX ORDER — POTASSIUM CHLORIDE 14.9 MG/ML
40 INJECTION INTRAVENOUS ONCE
Status: COMPLETED | OUTPATIENT
Start: 2022-10-10 | End: 2022-10-10

## 2022-10-10 RX ORDER — DEXTROSE MONOHYDRATE, SODIUM CHLORIDE, AND POTASSIUM CHLORIDE 50; 1.49; 4.5 G/1000ML; G/1000ML; G/1000ML
INJECTION, SOLUTION INTRAVENOUS CONTINUOUS
Status: DISCONTINUED | OUTPATIENT
Start: 2022-10-10 | End: 2022-10-11

## 2022-10-10 RX ORDER — MAGNESIUM SULFATE HEPTAHYDRATE 500 MG/ML
INJECTION, SOLUTION INTRAMUSCULAR; INTRAVENOUS CODE/TRAUMA/SEDATION MEDICATION
Status: COMPLETED | OUTPATIENT
Start: 2022-10-10 | End: 2022-10-10

## 2022-10-10 RX ORDER — MAGNESIUM SULFATE HEPTAHYDRATE 40 MG/ML
2 INJECTION, SOLUTION INTRAVENOUS ONCE
Status: DISCONTINUED | OUTPATIENT
Start: 2022-10-10 | End: 2022-10-10

## 2022-10-10 RX ADMIN — SUCRALFATE 1 G: 1 TABLET ORAL at 06:10

## 2022-10-10 RX ADMIN — SODIUM CHLORIDE, PRESERVATIVE FREE 10 ML: 5 INJECTION INTRAVENOUS at 11:10

## 2022-10-10 RX ADMIN — POTASSIUM CHLORIDE, DEXTROSE MONOHYDRATE AND SODIUM CHLORIDE: 150; 5; 450 INJECTION, SOLUTION INTRAVENOUS at 08:10

## 2022-10-10 RX ADMIN — ONDANSETRON 8 MG: 2 INJECTION INTRAMUSCULAR; INTRAVENOUS at 08:10

## 2022-10-10 RX ADMIN — MAGNESIUM SULFATE HEPTAHYDRATE 2 G: 500 INJECTION, SOLUTION INTRAMUSCULAR; INTRAVENOUS at 05:10

## 2022-10-10 RX ADMIN — FOLIC ACID 1 MG: 1 TABLET ORAL at 08:10

## 2022-10-10 RX ADMIN — DOCUSATE SODIUM 100 MG: 100 CAPSULE, LIQUID FILLED ORAL at 08:10

## 2022-10-10 RX ADMIN — SUCRALFATE 1 G: 1 TABLET ORAL at 05:10

## 2022-10-10 RX ADMIN — FUROSEMIDE 40 MG: 10 INJECTION, SOLUTION INTRAMUSCULAR; INTRAVENOUS at 06:10

## 2022-10-10 RX ADMIN — APIXABAN 5 MG: 5 TABLET, FILM COATED ORAL at 08:10

## 2022-10-10 RX ADMIN — SUCRALFATE 1 G: 1 TABLET ORAL at 09:10

## 2022-10-10 RX ADMIN — SACUBITRIL AND VALSARTAN 1 TABLET: 24; 26 TABLET, FILM COATED ORAL at 08:10

## 2022-10-10 RX ADMIN — AMIODARONE HYDROCHLORIDE 200 MG: 200 TABLET ORAL at 08:10

## 2022-10-10 RX ADMIN — METOPROLOL SUCCINATE 100 MG: 50 TABLET, EXTENDED RELEASE ORAL at 08:10

## 2022-10-10 RX ADMIN — FAMOTIDINE 20 MG: 10 INJECTION INTRAVENOUS at 08:10

## 2022-10-10 RX ADMIN — DOCUSATE SODIUM 100 MG: 100 CAPSULE, LIQUID FILLED ORAL at 09:10

## 2022-10-10 RX ADMIN — SODIUM CHLORIDE, PRESERVATIVE FREE 10 ML: 5 INJECTION INTRAVENOUS at 05:10

## 2022-10-10 RX ADMIN — APIXABAN 5 MG: 5 TABLET, FILM COATED ORAL at 09:10

## 2022-10-10 RX ADMIN — FUROSEMIDE 40 MG: 10 INJECTION, SOLUTION INTRAMUSCULAR; INTRAVENOUS at 05:10

## 2022-10-10 RX ADMIN — POTASSIUM CHLORIDE 40 MEQ: 200 INJECTION, SOLUTION INTRAVENOUS at 07:10

## 2022-10-10 RX ADMIN — SUCRALFATE 1 G: 1 TABLET ORAL at 11:10

## 2022-10-10 RX ADMIN — SODIUM CHLORIDE, PRESERVATIVE FREE 10 ML: 5 INJECTION INTRAVENOUS at 06:10

## 2022-10-10 NOTE — PLAN OF CARE
Updated clinicals and PT notes sent to [facility], spoke to Erin at [facility] rehab and she stated she will review with Dr Abreu.

## 2022-10-10 NOTE — PT/OT/SLP PROGRESS
Occupational Therapy   Treatment    Name: Sydney Bacon  MRN: [id number]  Admitting Diagnosis:  AAA resection, AVR 12 Days Post-Op    Recommendations:     Discharge Recommendations: rehabilitation facility  Discharge Equipment Recommendations: RW v rollator; SC v TTB  Barriers to discharge: Placement    Assessment:     Sydney Bacon is a 76 y.o. female with a medical diagnosis of s/p AAA resection, AVR, ligation BOB.  Performance deficits affecting function are weakness, impaired endurance, impaired self care skills, impaired functional mobility.     Rehab Prognosis:  Good; patient would benefit from acute skilled OT services to address these deficits and reach maximum level of function.       Plan:     Patient to be seen 5 x/week, 6 x/week to address the above listed problems via self-care/home management, therapeutic activities, therapeutic exercises  Plan of Care Expires: 10/18/22  Plan of Care Reviewed with: patient (nephew)    Subjective     Pain/Comfort:  Pain Rating 1: 0/10    Objective:     Communicated with: RN prior to session. Patient found HOB elevated with telemetry, pulse ox (continuous), and lifevest upon OT entry to room.    General Precautions: Standard, fall, sternal   Respiratory Status: Room air  Vitals:  BP: 109/71  RI: 65  O2: 90-97%     Occupational Performance:     Bed Mobility:    Patient completed Supine to Sit with mod A and BUEs positioned across chest, holding cardiac bear.  Patient completed Sit to Supine with SBA and BUEs positioned across chest, holding cardiac bear.      Functional Mobility/Transfers:  Patient completed Sit <> Stand Transfer with contact guard assistance  with  BUEs positioned across chest, holding cardiac bear.   Functional Mobility: Pt ambulated <> bathroom and then performed long distance gait trial in hallway with CGA progressing to SBA provided, using RW for balance only.     Activities of Daily Living:  Grooming: Pt performed oral hygiene task while standing  at sink with SBA provided.    Patient left HOB elevated with call button in reach and pt's nephew present.    GOALS:   Multidisciplinary Problems       Occupational Therapy Goals          Problem: Occupational Therapy    Goal Priority Disciplines Outcome Interventions   Occupational Therapy Goal     OT, PT/OT Ongoing, Progressing    Description: 1. Pt will ambulate with RW to bathroom and complete toilet t/f mod I.  2. Pt will complete UE dressing with sternal pxns mod I.  3. Pt will complete LE dressing with sternal pxns mod I.  4. Pt will complete toileting tasks mod I sternal pxns.                       Time Tracking:     OT Date of Treatment: 10/10/22  OT Start Time: 1107  OT Stop Time: 1129  OT Total Time (min): 22 min    Billable Minutes:Self Care/Home Management 22 mins    OT/SIA: OT     SIA Visit Number: 3    10/10/2022

## 2022-10-10 NOTE — PLAN OF CARE
Erin with [facility] denied, stated pt level appro for HH with sitter. I informed pt. She asked for other choices and we discussed SNF option. Verbal FOC obtained and referral for [facility] SNF will be started. Message to Candi (Special ) to initiate precess.

## 2022-10-10 NOTE — PROGRESS NOTES
CT SURGERY PROGRESS NOTE  Sydney Bacon  76 y.o.  1945    Patients Procedure: Procedure(s) (LRB):  REPLACEMENT, AORTIC VALVE (N/A)  REPAIR, AORTA, ASCENDING (N/A)    Subjective  Interval History: NAEO. Patient with no complaints. Has lifevest. Needs updated notes from PT for rehab referral process, had nurse reach out to PT.    Review of Systems   Constitutional: Negative.    Respiratory:  Negative for cough, sputum production and shortness of breath.    Cardiovascular:  Negative for chest pain, palpitations, claudication and leg swelling.   Gastrointestinal:  Negative for abdominal pain, nausea and vomiting.   Genitourinary: Negative.    Skin: Negative.         Incision C/D/I     Medication List  Infusions      Scheduled   amiodarone  200 mg Oral Daily    [START ON 10/12/2022] amiodarone  200 mg Oral Daily    apixaban  5 mg Oral BID    docusate sodium  100 mg Oral BID    famotidine (PF)  20 mg Intravenous Daily    folic acid  1 mg Oral Daily    furosemide (LASIX) injection  40 mg Intravenous Q12H    loperamide  4 mg Oral Once    metoprolol succinate  100 mg Oral Daily    sacubitriL-valsartan  1 tablet Oral BID    senna-docusate 8.6-50 mg  2 tablet Oral Once    sodium chloride 0.9%  10 mL Intravenous Q6H    sucralfate  1 g Oral QID (AC & HS)       Objective:  Recent Vitals:  Temp:  [97.7 °F (36.5 °C)-98.2 °F (36.8 °C)] 97.7 °F (36.5 °C)  Pulse:  [57-70] 69  Resp:  [16-20] 19  SpO2:  [87 %-93 %] 90 %  BP: ()/(53-81) 125/81    Physical Exam  Vitals and nursing note reviewed.   Constitutional:       General: She is awake. She is not in acute distress.     Appearance: Normal appearance. She is not toxic-appearing or diaphoretic.   HENT:      Head: Normocephalic and atraumatic.   Eyes:      Extraocular Movements: Extraocular movements intact.      Pupils: Pupils are equal, round, and reactive to light.   Cardiovascular:      Rate and Rhythm: Normal rate and regular rhythm.      Pulses: Normal pulses.            Radial pulses are 2+ on the right side and 2+ on the left side.        Dorsalis pedis pulses are 2+ on the right side and 2+ on the left side.      Heart sounds: Normal heart sounds.   Pulmonary:      Effort: Pulmonary effort is normal.      Breath sounds: Normal breath sounds.   Musculoskeletal:      Right lower leg: No edema.      Left lower leg: No edema.   Skin:     General: Skin is warm and dry.      Comments: INCISION C/D/I   Neurological:      General: No focal deficit present.      Mental Status: She is alert and oriented to person, place, and time.   Psychiatric:         Mood and Affect: Mood and affect normal.        I/O last 24 hrs:  Intake/Output - Last 3 Shifts         10/08 0700  10/09 0659 10/09 0700  10/10 0659 10/10 0700  10/11 0659    P.O. 720 620     Total Intake(mL/kg) 720 (11.4) 620 (9.7)     Urine (mL/kg/hr) 150 (0.1)      Stool 0      Total Output 150      Net +570 +620            Urine Occurrence 2 x 3 x     Stool Occurrence 2 x 1 x             Labs  BMP:   No results for input(s): GLU, NA, K, CL, CO2, BUN, CREATININE, CALCIUM, MG in the last 48 hours.    CBC:   No results for input(s): WBC, RBC, HGB, HCT, PLT, MCV, MCH, MCHC in the last 48 hours.    All pertinent labs from the last 24 hours have been reviewed.      Imaging:   CXR: X-Ray Chest AP Portable    Result Date: 9/29/2022  Mild bibasilar atelectasis with possible small pleural effusions. Electronically signed by: Ivan Chan Date:    09/29/2022 Time:    07:20   I have reviewed all pertinent imaging results/findings within the past 24 hours.        ASSESSMENT/PLAN:  - cardiology recs appreciated   -  IS/OOB/Ambulate  - PT/OT  - rehab dc planning    Case and plan of care discussed with MD Eliseo Lentz PA-C

## 2022-10-10 NOTE — PT/OT/SLP PROGRESS
Physical Therapy Treatment    Patient Name:  Sydney Bacon   MRN:  [id number]    Recommendations:     Discharge Recommendations:  rehabilitation facility, other (see comments) (vs home with sitter/family support)   Discharge Equipment Recommendations: walker, rolling   Barriers to discharge:  none    Assessment:     Sydney Bacon is a 76 y.o. female admitted with a medical diagnosis of AVR, AAA resection, post op Afib RVR  She presents with the following impairments/functional limitations:  weakness, gait instability.      Pt sitting up in the recliner with family member present. Pt tolerated session very well. Discussed home environment and sternal pxns. Pt is very motivated to mobilize and return to PLOF.     Rehab Prognosis: Good; patient would benefit from acute skilled PT services to address these deficits and reach maximum level of function.    Recent Surgery: Procedure(s) (LRB):  REPLACEMENT, AORTIC VALVE (N/A)  REPAIR, AORTA, ASCENDING (N/A) 12 Days Post-Op    Plan:     During this hospitalization, patient to be seen 5 x/week to address the identified rehab impairments via gait training, therapeutic activities, therapeutic exercises and progress toward the following goals:    Plan of Care Expires:  10/30/22    Subjective     Chief Complaint: None  Patient/Family Comments/goals: to get stronger  Pain/Comfort:  Pain Rating 1: 0/10      Objective:     Communicated with NSG prior to session.  Patient found up in chair with telemetry, pulse ox (continuous), Other (comments) (life vest) upon PTA entry to room.     General Precautions: Standard, sternal   Orthopedic Precautions:N/A   Braces: N/A  Respiratory Status: Room air     Functional Mobility:  T/F: [place] with RW (sternal pxn maintained)  Gait: 200' with RW, CGA, steady but slow jessi, decreased step length ANDREAS, no major LOB.         Patient left up in chair with all lines intact and call button in reach..    GOALS:   Multidisciplinary Problems        Physical Therapy Goals          Problem: Physical Therapy    Goal Priority Disciplines Outcome Goal Variances Interventions   Physical Therapy Goal     PT, PT/OT Ongoing, Progressing     Description: Goals to be met by: 10/30/2022     Patient will increase functional independence with mobility by performin. Supine to sit with Stand-by Assistance  2. Sit to stand transfer with Stand-by Assistance  3. Gait  x 200 feet with Stand-by Assistance using Rolling Walker.                          Time Tracking:     PT Received On: 10/10/22  PT Start Time: 944     PT Stop Time: 1008  PT Total Time (min): 24 min     Billable Minutes: Gait Training 1 unit    Treatment Type: Treatment  PT/PTA: PTA     PTA Visit Number: 3     10/10/2022

## 2022-10-11 LAB
ALBUMIN SERPL-MCNC: 2.8 GM/DL (ref 3.4–4.8)
ALBUMIN/GLOB SERPL: 0.8 RATIO (ref 1.1–2)
ALP SERPL-CCNC: 107 UNIT/L (ref 40–150)
ALT SERPL-CCNC: 17 UNIT/L (ref 0–55)
ANION GAP SERPL CALC-SCNC: 4 MEQ/L
ANION GAP SERPL CALC-SCNC: 9 MEQ/L
AST SERPL-CCNC: 21 UNIT/L (ref 5–34)
AV INDEX (PROSTH): 0.76
AV MEAN GRADIENT: 12 MMHG
AV PEAK GRADIENT: 22 MMHG
AV VELOCITY RATIO: 0.82
BASOPHILS # BLD AUTO: 0.04 X10(3)/MCL (ref 0–0.2)
BASOPHILS NFR BLD AUTO: 0.3 %
BILIRUBIN DIRECT+TOT PNL SERPL-MCNC: 0.6 MG/DL
BSA FOR ECHO PROCEDURE: 1.7 M2
BUN SERPL-MCNC: 13.3 MG/DL (ref 9.8–20.1)
BUN SERPL-MCNC: 14.7 MG/DL (ref 9.8–20.1)
BUN SERPL-MCNC: 14.8 MG/DL (ref 9.8–20.1)
CALCIUM SERPL-MCNC: 8.2 MG/DL (ref 8.4–10.2)
CALCIUM SERPL-MCNC: 8.3 MG/DL (ref 8.4–10.2)
CALCIUM SERPL-MCNC: 8.3 MG/DL (ref 8.4–10.2)
CHLORIDE SERPL-SCNC: 102 MMOL/L (ref 98–107)
CHLORIDE SERPL-SCNC: 102 MMOL/L (ref 98–107)
CHLORIDE SERPL-SCNC: 105 MMOL/L (ref 98–107)
CO2 SERPL-SCNC: 27 MMOL/L (ref 23–31)
CO2 SERPL-SCNC: 28 MMOL/L (ref 23–31)
CO2 SERPL-SCNC: 30 MMOL/L (ref 23–31)
CREAT SERPL-MCNC: 0.7 MG/DL (ref 0.55–1.02)
CREAT SERPL-MCNC: 0.7 MG/DL (ref 0.55–1.02)
CREAT SERPL-MCNC: 0.75 MG/DL (ref 0.55–1.02)
CREAT/UREA NIT SERPL: 19
CREAT/UREA NIT SERPL: 20
DOP CALC AO PEAK VEL: 2.37 M/S
DOP CALC AO VTI: 42.1 CM
DOP CALC LVOT PEAK VEL: 1.94 M/S
DOP CALC MV VTI: 20.3 CM
DOP CALCLVOT PEAK VEL VTI: 31.8 CM
E WAVE DECELERATION TIME: 230 MSEC
E/A RATIO: 1.31
EJECTION FRACTION: 25 %
EOSINOPHIL # BLD AUTO: 0.03 X10(3)/MCL (ref 0–0.9)
EOSINOPHIL NFR BLD AUTO: 0.2 %
ERYTHROCYTE [DISTWIDTH] IN BLOOD BY AUTOMATED COUNT: 13.7 % (ref 11.5–17)
GFR SERPLBLD CREATININE-BSD FMLA CKD-EPI: >60 MLS/MIN/1.73/M2
GLOBULIN SER-MCNC: 3.3 GM/DL (ref 2.4–3.5)
GLUCOSE SERPL-MCNC: 144 MG/DL (ref 82–115)
GLUCOSE SERPL-MCNC: 151 MG/DL (ref 82–115)
GLUCOSE SERPL-MCNC: 96 MG/DL (ref 82–115)
HCT VFR BLD AUTO: 40.3 % (ref 37–47)
HGB BLD-MCNC: 13.4 GM/DL (ref 12–16)
IMM GRANULOCYTES # BLD AUTO: 0.06 X10(3)/MCL (ref 0–0.04)
IMM GRANULOCYTES NFR BLD AUTO: 0.5 %
LEFT VENTRICLE DIASTOLIC VOLUME INDEX: 73.65 ML/M2
LEFT VENTRICLE DIASTOLIC VOLUME: 123 ML
LEFT VENTRICLE SYSTOLIC VOLUME INDEX: 47.5 ML/M2
LEFT VENTRICLE SYSTOLIC VOLUME: 79.4 ML
LVOT MG: 8 MMHG
LVOT MV: 1.23 CM/S
LYMPHOCYTES # BLD AUTO: 1.2 X10(3)/MCL (ref 0.6–4.6)
LYMPHOCYTES NFR BLD AUTO: 9.7 %
MAGNESIUM SERPL-MCNC: 2.2 MG/DL (ref 1.6–2.6)
MCH RBC QN AUTO: 30.4 PG (ref 27–31)
MCHC RBC AUTO-ENTMCNC: 33.3 MG/DL (ref 33–36)
MCV RBC AUTO: 91.4 FL (ref 80–94)
MONOCYTES # BLD AUTO: 1.32 X10(3)/MCL (ref 0.1–1.3)
MONOCYTES NFR BLD AUTO: 10.6 %
MV MEAN GRADIENT: 1 MMHG
MV PEAK A VEL: 0.45 M/S
MV PEAK E VEL: 0.59 M/S
MV PEAK GRADIENT: 2 MMHG
MV STENOSIS PRESSURE HALF TIME: 29 MS
MV VALVE AREA P 1/2 METHOD: 7.59 CM2
NEUTROPHILS # BLD AUTO: 9.8 X10(3)/MCL (ref 2.1–9.2)
NEUTROPHILS NFR BLD AUTO: 78.7 %
NRBC BLD AUTO-RTO: 0 %
PHOSPHATE SERPL-MCNC: 2.9 MG/DL (ref 2.3–4.7)
PLATELET # BLD AUTO: 334 X10(3)/MCL (ref 130–400)
PMV BLD AUTO: 9.5 FL (ref 7.4–10.4)
POTASSIUM SERPL-SCNC: 3 MMOL/L (ref 3.5–5.1)
POTASSIUM SERPL-SCNC: 3 MMOL/L (ref 3.5–5.1)
POTASSIUM SERPL-SCNC: 3.4 MMOL/L (ref 3.5–5.1)
POTASSIUM SERPL-SCNC: 4 MMOL/L (ref 3.5–5.1)
PROT SERPL-MCNC: 6.1 GM/DL (ref 5.8–7.6)
RBC # BLD AUTO: 4.41 X10(6)/MCL (ref 4.2–5.4)
SODIUM SERPL-SCNC: 138 MMOL/L (ref 136–145)
SODIUM SERPL-SCNC: 139 MMOL/L (ref 136–145)
SODIUM SERPL-SCNC: 139 MMOL/L (ref 136–145)
WBC # SPEC AUTO: 12.4 X10(3)/MCL (ref 4.5–11.5)

## 2022-10-11 PROCEDURE — 84100 ASSAY OF PHOSPHORUS: CPT

## 2022-10-11 PROCEDURE — 20000000 HC ICU ROOM

## 2022-10-11 PROCEDURE — 93010 EKG 12-LEAD: ICD-10-PCS | Mod: ,,, | Performed by: INTERNAL MEDICINE

## 2022-10-11 PROCEDURE — 99024 POSTOP FOLLOW-UP VISIT: CPT | Mod: POP,,,

## 2022-10-11 PROCEDURE — 83735 ASSAY OF MAGNESIUM: CPT

## 2022-10-11 PROCEDURE — 63600175 PHARM REV CODE 636 W HCPCS

## 2022-10-11 PROCEDURE — 93005 ELECTROCARDIOGRAM TRACING: CPT

## 2022-10-11 PROCEDURE — 63600175 PHARM REV CODE 636 W HCPCS: Performed by: NURSE PRACTITIONER

## 2022-10-11 PROCEDURE — 85025 COMPLETE CBC W/AUTO DIFF WBC: CPT

## 2022-10-11 PROCEDURE — C1751 CATH, INF, PER/CENT/MIDLINE: HCPCS

## 2022-10-11 PROCEDURE — 25000003 PHARM REV CODE 250: Performed by: NURSE PRACTITIONER

## 2022-10-11 PROCEDURE — 80048 BASIC METABOLIC PNL TOTAL CA: CPT

## 2022-10-11 PROCEDURE — 63600175 PHARM REV CODE 636 W HCPCS: Performed by: PHYSICIAN ASSISTANT

## 2022-10-11 PROCEDURE — 99024 PR POST-OP FOLLOW-UP VISIT: ICD-10-PCS | Mod: POP,,,

## 2022-10-11 PROCEDURE — 94760 N-INVAS EAR/PLS OXIMETRY 1: CPT

## 2022-10-11 PROCEDURE — 36415 COLL VENOUS BLD VENIPUNCTURE: CPT

## 2022-10-11 PROCEDURE — A4216 STERILE WATER/SALINE, 10 ML: HCPCS | Performed by: NURSE PRACTITIONER

## 2022-10-11 PROCEDURE — 84132 ASSAY OF SERUM POTASSIUM: CPT | Performed by: SPECIALIST

## 2022-10-11 PROCEDURE — 80053 COMPREHEN METABOLIC PANEL: CPT

## 2022-10-11 PROCEDURE — 25000003 PHARM REV CODE 250: Performed by: SPECIALIST

## 2022-10-11 PROCEDURE — 25000003 PHARM REV CODE 250

## 2022-10-11 PROCEDURE — 93010 ELECTROCARDIOGRAM REPORT: CPT | Mod: ,,, | Performed by: INTERNAL MEDICINE

## 2022-10-11 PROCEDURE — 97110 THERAPEUTIC EXERCISES: CPT

## 2022-10-11 RX ORDER — ENOXAPARIN SODIUM 100 MG/ML
1 INJECTION SUBCUTANEOUS
Status: DISCONTINUED | OUTPATIENT
Start: 2022-10-11 | End: 2022-10-11

## 2022-10-11 RX ORDER — POTASSIUM CHLORIDE 20 MEQ/1
20 TABLET, EXTENDED RELEASE ORAL DAILY
Status: DISCONTINUED | OUTPATIENT
Start: 2022-10-11 | End: 2022-10-12 | Stop reason: HOSPADM

## 2022-10-11 RX ORDER — LOPERAMIDE HYDROCHLORIDE 2 MG/1
2 CAPSULE ORAL 4 TIMES DAILY PRN
Status: DISCONTINUED | OUTPATIENT
Start: 2022-10-11 | End: 2022-10-12 | Stop reason: HOSPADM

## 2022-10-11 RX ORDER — LOPERAMIDE HYDROCHLORIDE 2 MG/1
4 CAPSULE ORAL ONCE
Status: COMPLETED | OUTPATIENT
Start: 2022-10-11 | End: 2022-10-11

## 2022-10-11 RX ORDER — METOPROLOL SUCCINATE 50 MG/1
50 TABLET, EXTENDED RELEASE ORAL DAILY
Status: DISCONTINUED | OUTPATIENT
Start: 2022-10-12 | End: 2022-10-12 | Stop reason: HOSPADM

## 2022-10-11 RX ORDER — FUROSEMIDE 20 MG/1
20 TABLET ORAL DAILY
Status: DISCONTINUED | OUTPATIENT
Start: 2022-10-12 | End: 2022-10-12 | Stop reason: HOSPADM

## 2022-10-11 RX ADMIN — ACETAMINOPHEN 650 MG: 325 TABLET, FILM COATED ORAL at 04:10

## 2022-10-11 RX ADMIN — SODIUM CHLORIDE, PRESERVATIVE FREE 10 ML: 5 INJECTION INTRAVENOUS at 06:10

## 2022-10-11 RX ADMIN — SUCRALFATE 1 G: 1 TABLET ORAL at 04:10

## 2022-10-11 RX ADMIN — SUCRALFATE 1 G: 1 TABLET ORAL at 06:10

## 2022-10-11 RX ADMIN — SODIUM CHLORIDE, PRESERVATIVE FREE 10 ML: 5 INJECTION INTRAVENOUS at 12:10

## 2022-10-11 RX ADMIN — FUROSEMIDE 40 MG: 10 INJECTION, SOLUTION INTRAMUSCULAR; INTRAVENOUS at 03:10

## 2022-10-11 RX ADMIN — SUCRALFATE 1 G: 1 TABLET ORAL at 11:10

## 2022-10-11 RX ADMIN — FOLIC ACID 1 MG: 1 TABLET ORAL at 08:10

## 2022-10-11 RX ADMIN — POTASSIUM CHLORIDE 40 MEQ: 14.9 INJECTION, SOLUTION INTRAVENOUS at 01:10

## 2022-10-11 RX ADMIN — FAMOTIDINE 20 MG: 10 INJECTION INTRAVENOUS at 08:10

## 2022-10-11 RX ADMIN — AMIODARONE HYDROCHLORIDE 200 MG: 200 TABLET ORAL at 08:10

## 2022-10-11 RX ADMIN — POTASSIUM CHLORIDE, DEXTROSE MONOHYDRATE AND SODIUM CHLORIDE: 150; 5; 450 INJECTION, SOLUTION INTRAVENOUS at 09:10

## 2022-10-11 RX ADMIN — SACUBITRIL AND VALSARTAN 1 TABLET: 24; 26 TABLET, FILM COATED ORAL at 09:10

## 2022-10-11 RX ADMIN — SUCRALFATE 1 G: 1 TABLET ORAL at 09:10

## 2022-10-11 RX ADMIN — POTASSIUM CHLORIDE 20 MEQ: 1500 TABLET, EXTENDED RELEASE ORAL at 12:10

## 2022-10-11 RX ADMIN — APIXABAN 5 MG: 5 TABLET, FILM COATED ORAL at 09:10

## 2022-10-11 RX ADMIN — ENOXAPARIN SODIUM 60 MG: 100 INJECTION SUBCUTANEOUS at 09:10

## 2022-10-11 RX ADMIN — LOPERAMIDE HYDROCHLORIDE 4 MG: 2 CAPSULE ORAL at 03:10

## 2022-10-11 RX ADMIN — POTASSIUM CHLORIDE 40 MEQ: 14.9 INJECTION, SOLUTION INTRAVENOUS at 06:10

## 2022-10-11 NOTE — PROGRESS NOTES
"[facility] - [facility]  Cardiology  Progress Note    Patient Name: Sydney Bacon  MRN: [id number]  Admission Date: 9/28/2022  Hospital Length of Stay: 13 days  Code Status: Full Code   Attending Physician: Wing Edmonds IV, MD   Primary Care Physician: Valente Bacon MD  Expected Discharge Date: 10/11/2022  Principal Problem:<principal problem not specified>    Subjective:     Brief HPI: Ms. Bacon is a 75 y/o female who is known to [facility], Dr. Mason. She was recently worked up for VHD and found to have a AAA and Mod AR, MI/MR. She was referred to [facility] for evaluation of surgical intervention. On 9.28.22 she underwent a AAA Resection and Placement of 32mm Interposition Graft with AVR 25mm Mosaic Porcine Valve as well as a BOB Ligation with EndoStapler. She tolerated the procedure well and was admitted to ICU post operatively. She became tachycardic and was found to be in A.Fib with RVR. [facility] was consulted for this reason.     Hospital Course:   10.3.22: NAD. Remains in PAF/RVR. IV Infiltration Last PM with Amio. Back on IV Amio at 0.5mg/min. "I am ok." Denies SOB and Palps. + CP/Incisional.   10.4.22: NAD. Denies CP, SOB and Palps. "I am ok." Sitting in Bedside Chair. Remains in PAF with RVR (110s-120s)  10.5.22: NAD. Denies Cp, SOB, or palps. Sitting up in chair. NPO for scheduled MING/DCCV today. PAF w/ RVR on tele. -1428.4/24 hrs.   10.6.22: NAD. Denies CP, SOB, or palps. Sitting up in chair. SR on tele.   10.7.22: NAD noted. Denies CP/SOB/Palps. SR on tele. Awaiting rehab placement  10.11.22: Reconsulted for VT cardiac arrest. Patient had VT cardiac arrest with successful cardioversion by LifeVest. Patient was transferred back to ICU for close monitoring. VSS. Denies CP/SOB/palps.    PMH: VHD (Mod AR, MI/MR), Chronic Systolic HF, Hypothyroidism, HTN, HLD  PSH: Cataract Surgery, Angiogram   Family History: Father, D, AAA; Mother, D, CVA, CAD  Social History: Denies alcohol, tobacco, or illicit " drug use.      Previous Cardiac Diagnostics:   Echo Limited 10.6.22  Concentric hypertrophy and severely decreased systolic function.    The estimated ejection fraction is 20-25%.    Limited study to assess LV function    MING w/ DCCV 10.5.22:  LV systolic function 25-30%.  No intracardiac thrombus is present.  BOB is absent consistent with BOB surgical ligation at the time of AVR. Smoke seen in LA  Well seated bioprosthetic AV without significant stenosis or perivalvular leak.   Successful cardioversion x 1  Recommendations:  Switch to po amiodarone with tapering dose and with holding parameters if HR <50 bpm  Short term Eliquis 5 mg po bid for 30 days only.   BOB is completely ligated, no need for long term OAC.  Continue IV diuresis, SAO2 92-93% before starting the procedure.  Repeat Limited echo in AM with Definity contrast to reassess LVEF, If LVEF < 35%, consider LifeVest prior to discharge.    [facility] 9.7.22:  Normal Coronaries  LVEF 45%     ECHO 7.21.22:  The study quality is good.   The left ventricle is moderately enlarged. Normal wall thicknesses. Global left ventricular systolic function is mildly decreased. The left ventricular ejection fraction is 45%. The left ventricle diastolic function is impaired (Grade I) with normal left atrial pressure.  The left atrium is moderately enlarged.  Dilation of the aortic root and ascending aorta is noted. Aortic root diameter is 3.8 cms. Ascending aorta diameter is 4.1 cms.   Severe (4+) eccentric aortic regurgitation. EZK=[id number]. Diastolic flow reversal is seen in the descending aorta.  Mild (1+) mitral regurgitation. Mild (1+) tricuspid regurgitation.   The estimated pulmonary artery systolic pressure is 24 mmHg assuming a right atrial pressure of 3 mmHg.      PET 5.18.18:  This is an abnormal perfusion study.   This scan is suggestive of moderate risk for future cardiovascular events.   Small partially reversible perfusion abnormality of moderate intensity in the  apical segment. Small reversible perfusion abnormality of moderate intensity in the anterior septal region. Small fixed perfusion abnormality of mild intensity in the apical inferior segment.   The left ventricular cavity is noted to be markedly enlarged on the stress studies. The stress left ventricular ejection fraction was calculated to be 36% and left ventricular global function is moderately reduced. The rest left ventricular cavity is noted to be moderately enlarged. The rest left ventricular ejection fraction was calculated to be 31% and rest left ventricular global function is moderately reduced.   When compared to the resting ejection fraction (31%), the stress ejection fraction (36%) has increased.   The study quality is good.    Review of Systems   Constitutional: Positive for malaise/fatigue.   Cardiovascular:  Negative for chest pain, irregular heartbeat and palpitations.   Respiratory:  Negative for cough and shortness of breath.    All other systems reviewed and are negative.    Objective:     Vital Signs (Most Recent):  Temp: 97.5 °F (36.4 °C) (10/11/22 0400)  Pulse: (!) 50 (10/11/22 0611)  Resp: 15 (10/11/22 0611)  BP: (!) 99/47 (10/11/22 0603)  SpO2: 95 % (10/11/22 0611)   Vital Signs (24h Range):  Temp:  [97.5 °F (36.4 °C)-99 °F (37.2 °C)] 97.5 °F (36.4 °C)  Pulse:  [45-93] 50  Resp:  [14-25] 15  SpO2:  [84 %-98 %] 95 %  BP: ()/(45-74) 99/47     Weight: 63.8 kg (140 lb 10.5 oz)  Body mass index is 24.92 kg/m².    SpO2: 95 %  O2 Device (Oxygen Therapy): nasal cannula      Intake/Output Summary (Last 24 hours) at 10/11/2022 0888  Last data filed at 10/11/2022 0548  Gross per 24 hour   Intake 1385 ml   Output 550 ml   Net 835 ml       Lines/Drains/Airways       Peripheral Intravenous Line  Duration                  Midline Catheter Insertion/Assessment  - Single Lumen 10/05/22 0839 Right basilic vein (medial side of arm) other (see comments) 5 days                  Significant Labs:   Recent  Results (from the past 72 hour(s))   Respiratory Culture    Collection Time: 10/10/22  9:26 AM    Specimen: Nasal Swab; NP MRSA   Result Value Ref Range    Respiratory Culture Rare normal respiratory radha    Comprehensive Metabolic Panel    Collection Time: 10/10/22  5:59 PM   Result Value Ref Range    Sodium Level 138 136 - 145 mmol/L    Potassium Level 2.3 (LL) 3.5 - 5.1 mmol/L    Chloride 96 (L) 98 - 107 mmol/L    Carbon Dioxide 31 23 - 31 mmol/L    Glucose Level 167 (H) 82 - 115 mg/dL    Blood Urea Nitrogen 16.6 9.8 - 20.1 mg/dL    Creatinine 0.77 0.55 - 1.02 mg/dL    Calcium Level Total 8.7 8.4 - 10.2 mg/dL    Protein Total 6.6 5.8 - 7.6 gm/dL    Albumin Level 3.2 (L) 3.4 - 4.8 gm/dL    Globulin 3.4 2.4 - 3.5 gm/dL    Albumin/Globulin Ratio 0.9 (L) 1.1 - 2.0 ratio    Bilirubin Total 0.8 <=1.5 mg/dL    Alkaline Phosphatase 132 40 - 150 unit/L    Alanine Aminotransferase 20 0 - 55 unit/L    Aspartate Aminotransferase 26 5 - 34 unit/L    eGFR >60 mls/min/1.73/m2   Troponin I    Collection Time: 10/10/22  5:59 PM   Result Value Ref Range    Troponin-I 0.105 (H) 0.000 - 0.045 ng/mL   CBC with Differential    Collection Time: 10/10/22  5:59 PM   Result Value Ref Range    WBC 12.8 (H) 4.5 - 11.5 x10(3)/mcL    RBC 4.69 4.20 - 5.40 x10(6)/mcL    Hgb 14.6 12.0 - 16.0 gm/dL    Hct 42.5 37.0 - 47.0 %    MCV 90.6 80.0 - 94.0 fL    MCH 31.1 (H) 27.0 - 31.0 pg    MCHC 34.4 33.0 - 36.0 mg/dL    RDW 13.8 11.5 - 17.0 %    Platelet 342 130 - 400 x10(3)/mcL    MPV 9.3 7.4 - 10.4 fL    Neut % 66.0 %    Lymph % 20.1 %    Mono % 11.9 %    Eos % 1.0 %    Basophil % 0.3 %    Lymph # 2.58 0.6 - 4.6 x10(3)/mcL    Neut # 8.5 2.1 - 9.2 x10(3)/mcL    Mono # 1.53 (H) 0.1 - 1.3 x10(3)/mcL    Eos # 0.13 0 - 0.9 x10(3)/mcL    Baso # 0.04 0 - 0.2 x10(3)/mcL    IG# 0.09 (H) 0 - 0.04 x10(3)/mcL    IG% 0.7 %    NRBC% 0.0 %   Magnesium    Collection Time: 10/10/22  5:59 PM   Result Value Ref Range    Magnesium Level 3.60 (H) 1.60 - 2.60 mg/dL    Phosphorus    Collection Time: 10/10/22  5:59 PM   Result Value Ref Range    Phosphorus Level 2.9 2.3 - 4.7 mg/dL   Echo    Collection Time: 10/10/22  6:13 PM   Result Value Ref Range    BSA 1.7 m2    EF 25 %    Left Ventricular Outflow Tract Mean Velocity 1.23 cm/s    Left Ventricular Outflow Tract Mean Gradient 8.00 mmHg    AV mean gradient 12 mmHg    AV Velocity Ratio 0.82     AV index (prosthetic) 0.76     MV mean gradient 1 mmHg    MV valve area p 1/2 method 7.59 cm2    E/A ratio 1.31     E wave deceleration time 230.00 msec    LVOT peak howard 1.94 m/s    LVOT peak VTI 31.80 cm    Ao peak howard 2.37 m/s    Ao VTI 42.1 cm    AV peak gradient 22 mmHg    MV peak gradient 2 mmHg    MV Peak E Howard 0.59 m/s    MV VTI 20.3 cm    MV stenosis pressure 1/2 time 29.00 ms    MV Peak A Howard 0.45 m/s    LV Systolic Volume 79.40 mL    LV Systolic Volume Index 47.5 mL/m2    LV Diastolic Volume 123.00 mL    LV Diastolic Volume Index 73.65 mL/m2   Basic Metabolic Panel    Collection Time: 10/11/22 12:23 AM   Result Value Ref Range    Sodium Level 138 136 - 145 mmol/L    Potassium Level 3.0 (L) 3.5 - 5.1 mmol/L    Chloride 102 98 - 107 mmol/L    Carbon Dioxide 27 23 - 31 mmol/L    Glucose Level 151 (H) 82 - 115 mg/dL    Blood Urea Nitrogen 14.7 9.8 - 20.1 mg/dL    Creatinine 0.75 0.55 - 1.02 mg/dL    BUN/Creatinine Ratio 20     Calcium Level Total 8.3 (L) 8.4 - 10.2 mg/dL    Anion Gap 9.0 mEq/L    eGFR >60 mls/min/1.73/m2   Magnesium    Collection Time: 10/11/22 12:23 AM   Result Value Ref Range    Magnesium Level 2.20 1.60 - 2.60 mg/dL   Phosphorus    Collection Time: 10/11/22 12:23 AM   Result Value Ref Range    Phosphorus Level 2.9 2.3 - 4.7 mg/dL   Comprehensive Metabolic Panel    Collection Time: 10/11/22  1:11 AM   Result Value Ref Range    Sodium Level 139 136 - 145 mmol/L    Potassium Level 3.0 (L) 3.5 - 5.1 mmol/L    Chloride 102 98 - 107 mmol/L    Carbon Dioxide 28 23 - 31 mmol/L    Glucose Level 144 (H) 82 - 115 mg/dL     Blood Urea Nitrogen 14.8 9.8 - 20.1 mg/dL    Creatinine 0.70 0.55 - 1.02 mg/dL    Calcium Level Total 8.3 (L) 8.4 - 10.2 mg/dL    Protein Total 6.1 5.8 - 7.6 gm/dL    Albumin Level 2.8 (L) 3.4 - 4.8 gm/dL    Globulin 3.3 2.4 - 3.5 gm/dL    Albumin/Globulin Ratio 0.8 (L) 1.1 - 2.0 ratio    Bilirubin Total 0.6 <=1.5 mg/dL    Alkaline Phosphatase 107 40 - 150 unit/L    Alanine Aminotransferase 17 0 - 55 unit/L    Aspartate Aminotransferase 21 5 - 34 unit/L    eGFR >60 mls/min/1.73/m2   CBC with Differential    Collection Time: 10/11/22  1:12 AM   Result Value Ref Range    WBC 12.4 (H) 4.5 - 11.5 x10(3)/mcL    RBC 4.41 4.20 - 5.40 x10(6)/mcL    Hgb 13.4 12.0 - 16.0 gm/dL    Hct 40.3 37.0 - 47.0 %    MCV 91.4 80.0 - 94.0 fL    MCH 30.4 27.0 - 31.0 pg    MCHC 33.3 33.0 - 36.0 mg/dL    RDW 13.7 11.5 - 17.0 %    Platelet 334 130 - 400 x10(3)/mcL    MPV 9.5 7.4 - 10.4 fL    Neut % 78.7 %    Lymph % 9.7 %    Mono % 10.6 %    Eos % 0.2 %    Basophil % 0.3 %    Lymph # 1.20 0.6 - 4.6 x10(3)/mcL    Neut # 9.8 (H) 2.1 - 9.2 x10(3)/mcL    Mono # 1.32 (H) 0.1 - 1.3 x10(3)/mcL    Eos # 0.03 0 - 0.9 x10(3)/mcL    Baso # 0.04 0 - 0.2 x10(3)/mcL    IG# 0.06 (H) 0 - 0.04 x10(3)/mcL    IG% 0.5 %    NRBC% 0.0 %     Telemetry: PAF/RVR    Physical Exam  Constitutional:       Appearance: Normal appearance.   HENT:      Head: Normocephalic.      Mouth/Throat:      Mouth: Mucous membranes are moist.   Eyes:      Extraocular Movements: Extraocular movements intact.   Cardiovascular:      Rate and Rhythm: Regular rhythm. Bradycardia present.      Pulses: Normal pulses.   Pulmonary:      Effort: Pulmonary effort is normal.      Breath sounds: Decreased breath sounds present.   Abdominal:      Palpations: Abdomen is soft.   Musculoskeletal:         General: Normal range of motion.   Skin:     General: Skin is warm and dry.      Capillary Refill: Capillary refill takes less than 2 seconds.      Comments: Midline Sternotomy SIA C/D/I   Neurological:       General: No focal deficit present.      Mental Status: She is alert and oriented to person, place, and time.   Psychiatric:         Mood and Affect: Mood normal.         Behavior: Behavior normal.     Current Inpatient Medications:    Current Facility-Administered Medications:     0.9%  NaCl infusion (for blood administration), , Intravenous, Q24H PRN, Wing Edmonds IV, MD    acetaminophen tablet 650 mg, 650 mg, Oral, Q4H PRN, Wing Edmonds IV, MD, 650 mg at 10/11/22 0404    albumin human 5% bottle 12.5 g, 12.5 g, Intravenous, PRN, ROSANGELA Alfonso, Stopped at 09/29/22 0258    amiodarone tablet 200 mg, 200 mg, Oral, Daily, LOREN Gore, 200 mg at 10/10/22 0858    [START ON 10/12/2022] amiodarone tablet 200 mg, 200 mg, Oral, Daily, FRANKLYN GoreP    apixaban tablet 5 mg, 5 mg, Oral, BID, LOREN Gore, 5 mg at 10/10/22 2100    bisacodyL suppository 10 mg, 10 mg, Rectal, Daily PRN, Wing Edmonds IV, MD, 10 mg at 10/08/22 1700    calcium gluconate 1 g in NS IVPB (premixed), 1 g, Intravenous, PRN, ROSANGELA Alfonso    calcium gluconate 1 g in NS IVPB (premixed), 2 g, Intravenous, PRN, ROSANGELA Alfonso    calcium gluconate 1 g in NS IVPB (premixed), 3 g, Intravenous, PRN, ROSANGELA Alfonso    dextrose 10% bolus 125 mL, 12.5 g, Intravenous, PRN, Wing Edmonds IV, MD    dextrose 10% bolus 250 mL, 25 g, Intravenous, PRN, Wing Edmonds IV, MD    dextrose 5 % and 0.45 % NaCl with KCl 20 mEq infusion, , Intravenous, Continuous, Lenin Burns PA-C, Last Rate: 75 mL/hr at 10/10/22 2015, New Bag at 10/10/22 2015    docusate sodium capsule 100 mg, 100 mg, Oral, BID, ROSANGELA Alfonso, 100 mg at 10/10/22 2100    famotidine (PF) injection 20 mg, 20 mg, Intravenous, Daily, ROSANGELA Alfonso, 20 mg at 10/10/22 0857    folic acid tablet 1 mg, 1 mg, Oral, Daily, ROSANGELA Alfonso, 1 mg at 10/10/22 0858    furosemide injection 40 mg, 40 mg, Intravenous, Q12H, JUNE Ramirez, 40 mg at 10/11/22 8486     HYDROcodone-acetaminophen 5-325 mg per tablet 1 tablet, 1 tablet, Oral, Q4H PRN, ROSANGELA Alfonso, 1 tablet at 10/09/22 2048    lactulose 10 gram/15 ml solution 20 g, 20 g, Oral, Q6H PRN, ROSANGELA Alfonso    loperamide capsule 4 mg, 4 mg, Oral, Once, ROSANGELA Alfonso    magnesium sulfate 2g in water 50mL IVPB (premix), 2 g, Intravenous, PRN, ROSANGELA Alfonso    magnesium sulfate 2g in water 50mL IVPB (premix), 4 g, Intravenous, PRN, ROSANGELA Alfonso    metoclopramide HCl injection 5 mg, 5 mg, Intravenous, Q6H PRN, ROSANGELA Alfonso    metoprolol succinate (TOPROL-XL) 24 hr tablet 100 mg, 100 mg, Oral, Daily, NADER Tuttle, 100 mg at 10/10/22 0858    morphine injection 2 mg, 2 mg, Intravenous, PRN, ROSANGELA Alfonso    ondansetron injection 8 mg, 8 mg, Intravenous, Q8H PRN, ROSANGELA Alfonso, 8 mg at 10/10/22 2031    polyethylene glycol packet 17 g, 17 g, Oral, BID PRN, Wing Edmonds IV, MD, 17 g at 10/08/22 2013    potassium chloride 20 mEq in 100 mL IVPB (FOR CENTRAL LINE ADMINISTRATION ONLY), 20 mEq, Intravenous, PRN, ROSANGELA Alfonso, Last Rate: 50 mL/hr at 10/05/22 0800, Rate Verify at 10/05/22 0800    potassium chloride 20 mEq in 100 mL IVPB (FOR CENTRAL LINE ADMINISTRATION ONLY), 40 mEq, Intravenous, PRN, ROSANGELA Alfonso, Last Rate: 25 mL/hr at 10/11/22 0120, 40 mEq at 10/11/22 0120    sacubitriL-valsartan 24-26 mg per tablet 1 tablet, 1 tablet, Oral, BID, NADER Tuttle, 1 tablet at 10/10/22 0858    senna tablet 2 tablet, 2 tablet, Oral, Daily PRN, Wing Edmonds IV, MD, 2 tablet at 10/09/22 1142    senna-docusate 8.6-50 mg per tablet 2 tablet, 2 tablet, Oral, Once, Wing Edmonds IV, MD    sodium chloride 0.9% flush 10 mL, 10 mL, Intravenous, PRN, Ciro Rg MD    Flushing PICC Protocol, , , Until Discontinued **AND** sodium chloride 0.9% flush 10 mL, 10 mL, Intravenous, Q6H, 10 mL at 10/11/22 0600 **AND** sodium chloride 0.9% flush 10 mL, 10 mL, Intravenous, PRN, Yun Rivera, NP     sodium chloride 0.9% flush 2 mL, 2 mL, Intravenous, PRN, ROSANGELA Alfonso, 2 mL at 10/07/22 1158    sodium phosphate 15 mmol in dextrose 5 % 250 mL IVPB, 15 mmol, Intravenous, PRN, ROSANGELA Alfonso    sodium phosphate 20.01 mmol in dextrose 5 % 250 mL IVPB, 20.01 mmol, Intravenous, PRN, ROSANGELA Alfonso    sodium phosphate 30 mmol in dextrose 5 % 250 mL IVPB, 30 mmol, Intravenous, PRN, ROSANGELA Alfonso    sucralfate tablet 1 g, 1 g, Oral, QID (AC & HS), ROSANGELA Alfonso, 1 g at 10/11/22 0645    VTE Risk Mitigation (From admission, onward)           Ordered     apixaban tablet 5 mg  2 times daily         10/06/22 1014     Place sequential compression device  Until discontinued         09/28/22 1021     IP VTE HIGH RISK PATIENT  Once         09/28/22 1010                  Assessment:   VT arrest/successful cardioversion  VHD (Mod AR, Mild MI/MR)    - s/p (9.28.22) - AVR 25mm Mosaic Porcine Valve  AAA    - s/p (9.28.22) - AAA Resection and Placement of 32mm Interposition Graft  PAF with RVR    - CHADsVASc - 5 Points - 7.2% Stroke Risk per Year     - s/p BOB Ligation (9.28.22) with Endostapler  Acute on Chronic Systolic HF/EF 20-25%  Hx of Hypothyroidism  HTN  HLD  No Hx of GIB    Plan:   Continue Amio oral load for now, may need to increase secondary to VT: amio 200 mg BID x 3 days, then 200 mg daily.  Continue Toprol XL 100mg daily  Continue Entresto 24/26mg BID with hold parameters  No ASA Secondary to Allergy  Resume anticoagulation once appropriate per surgery.  Start FD Lovenox for CVA prophylaxis  BOB is completely ligated, defer long term OAC for AF discussion to an outpatient setting, will still need 30 day course for OAC secondary recent DCCV for Afib.  Resume AC if no active CI exists  Continue Lasix 40mg daily  Keep K >4 and Mg > 2  Consult EP to evaluate for ICD placement    ANNA Tuttle-BC  Cardiology  [facility] - [address] [place] [address]  10/11/2022

## 2022-10-11 NOTE — PROGRESS NOTES
"Inpatient Nutrition Evaluation    Admit Date: 9/28/2022   Total duration of encounter: 13 days    Nutrition Recommendation/Prescription     Continue current diet as tolerated.  Continue Boost Plus (provides 360 kcal, 14 g protein per serving) TID.    Nutrition Assessment     Chart Review    Reason Seen: length of stay and follow-up    Diagnosis:  Severe aortic insufficiency  Ascending aortic aneurysm  Status post aortic valve replacement and ascending aortic aneurysm resection and repair    Relevant Medical History: aortic and mitral insuffiency, HTN, nonischemic cardiomyopathy with EF 45%    Nutrition-Related Medications: D5-1/2NS @ 75ml/hr, docusate, folic acid    Nutrition-Related Labs:  10/4 K 3.4  10/11 noted    Diet Order: Diet heart healthy 2000 kcal  Oral Supplement Order: none at this time  Appetite/Oral Intake: good/% of meals  Factors Affecting Nutritional Intake: none identified at this time  Food/Hinduism/Cultural Preferences: none reported    Skin Integrity: incision  Wound(s):   incision noted    Comments    10/4/22: Pt with decreased appetite at this time. Ok for ONS, will add chocolate flavor. No wt changes PTA. #.  10/11/22: Pt with 100% po intake of meals, good tolerance.    Anthropometrics    Height: 5' 3" (160 cm) Height Method: Stated  Last Weight: 63.1 kg (139 lb 1.8 oz) (10/11/22 0800) Weight Method: Bed Scale  BMI (Calculated): 24.6  BMI Classification: normal (BMI 18.5-24.9)     Ideal Body Weight (IBW), Female: 115 lb     % Ideal Body Weight, Female (lb): 124.23 %                             Usual Weight Provided By: patient    Wt Readings from Last 5 Encounters:   10/11/22 63.1 kg (139 lb 1.8 oz)   09/13/22 64.4 kg (142 lb)   08/23/22 64.9 kg (143 lb)     Weight Change(s) Since Admission:  Admit Weight: 64.4 kg (142 lb) (09/23/22 1201)  10/11/22: 63.1kg    Patient Education    Not applicable.    Monitoring & Evaluation     Dietitian will monitor energy intake.  Nutrition " Risk/Follow-Up: low (follow-up in 5-7 days)  Patients assigned 'low nutrition risk' status do not qualify for a full nutritional assessment but will be monitored and re-evaluated in a 5-7 day time period.

## 2022-10-11 NOTE — NURSING
Nurses Note -- 4 Eyes      10/10/2022   8:26 PM      Skin assessed during: Transfer      [x] No Pressure Injuries Present    [x]Prevention Measures Documented      [] Yes- Altered Skin Integrity Present or Discovered   [] LDA Added if Not in Epic (Describe Wound)   [] New Altered Skin Integrity was Present on Admit and Documented in LDA   [] Wound Image Taken    Wound Care Consulted? No    Attending Nurse:  Otoniel Dahl RN     Second RN/Staff Member:  Taylor Boucher RN

## 2022-10-11 NOTE — H&P
[facility] - 3rd Floor Medical Telemetry  Pulmonary Critical Care Note    Patient Name: Sydney Bacon  MRN: [id number]  Admission Date: 9/28/2022  Hospital Length of Stay: 12 days  Code Status: Full Code  Attending Provider: Wing Edmonds IV, MD  Primary Care Provider: Valente Bacon MD     Subjective:     HPI:     68-year-old female past medical history a mi tracts seen, hypertension, ischemic cardiomyopathy the with ejection fraction 20-25% echo( 10/06/2022) patient was admitted to ICU September 28, 2022 status post aortic valve repair with ascending aorta resection which was repaired by Dr. Edmonds.  This evening patient had a run of V-tach in which she was defibrillated by her LifeVest.  Prior to the events patient states that she had fainted and lost consciousness for about 1 minute.  Patient experienced lightheadedness with no focal neurologic deficits or seizure activity witnessed.  Cardiology was consulted and advised to get of a 500 cc bolus x2 to see if patient's pressure improved.  ICU was called to evaluate patient needed to be moved to ICU.  Patient's blood pressure had improved from SBP in the 70s to his  after 500 cc of fluid.  Due to patient being shocked twice and the ratio nurses taking care of patients on telemetry floor it was decided that patient needed to be upgraded for closer monitoring in the ICU.        Past Medical History:   Diagnosis Date    Hyperlipidemia     Hypertension     Hypothyroidism     Stenosis of aortic and mitral valves     Valvular regurgitation        Past Surgical History:   Procedure Laterality Date    CATARACT EXTRACTION Bilateral     COLONOSCOPY      LEFT HEART CATHETERIZATION         Social History     Socioeconomic History    Marital status: Single   Tobacco Use    Smoking status: Never    Smokeless tobacco: Never   Substance and Sexual Activity    Alcohol use: Not Currently    Drug use: Never     Social Determinants of Health     Food  Insecurity: Unknown    Worried About Running Out of Food in the Last Year: Never true   Transportation Needs: Unknown    Lack of Transportation (Medical): No   Housing Stability: Unknown    Unable to Pay for Housing in the Last Year: No           Objective:     Current Outpatient Medications   Medication Instructions    b complex vitamins tablet 1 tablet, Oral, Daily    calcium citrate (CALCITRATE) 200 mg (950 mg) tablet 1 tablet, Oral, Daily    coenzyme Q10 100 mg, Oral, Daily    enzymes,digestive (DIGESTIVE ENZYMES ORAL) 1 capsule, Oral, Daily    famotidine (PEPCID) 20 mg, Oral, Daily PRN    furosemide (LASIX) 40 mg, Oral, Daily    glucos sul 2KCl/msm/chond/C/Mn (GLUCOSAMINE CHONDROITIN ORAL) 1 tablet, Oral, Daily    levothyroxine (SYNTHROID) 75 mcg, Oral, Daily    loratadine (CLARITIN) 10 mg, Oral, Daily    losartan (COZAAR) 100 mg, Oral, Daily    metoprolol succinate (TOPROL-XL) 50 mg, Oral, Daily    multivitamin with minerals tablet 1 tablet, Oral, Daily    potassium chloride SA (K-DUR,KLOR-CON) 20 MEQ tablet 20 mEq, Oral, Daily    raloxifene (EVISTA) 60 mg, Oral, Daily    simvastatin (ZOCOR) 20 mg, Oral, Nightly    spironolactone (ALDACTONE) 25 mg, Oral, Daily    vit A/vit C/vit E/zinc/copper (PRESERVISION AREDS ORAL) 1 capsule, Oral, 2 times daily    vitamin D (VITAMIN D3) 1,000 Units, Oral, 2 times daily       Current Inpatient Medications   amiodarone  200 mg Oral Daily    [START ON 10/12/2022] amiodarone  200 mg Oral Daily    apixaban  5 mg Oral BID    docusate sodium  100 mg Oral BID    famotidine (PF)  20 mg Intravenous Daily    folic acid  1 mg Oral Daily    furosemide (LASIX) injection  40 mg Intravenous Q12H    loperamide  4 mg Oral Once    metoprolol succinate  100 mg Oral Daily    potassium chloride  40 mEq Intravenous Once    sacubitriL-valsartan  1 tablet Oral BID    senna-docusate 8.6-50 mg  2 tablet Oral Once    sodium chloride 0.9%  10 mL Intravenous Q6H    sucralfate  1 g Oral QID (AC & HS)            Intake/Output Summary (Last 24 hours) at 10/10/2022 1947  Last data filed at 10/10/2022 1323  Gross per 24 hour   Intake 480 ml   Output --   Net 480 ml       Review of Systems   Constitutional:  Negative for chills and fever.   Eyes:  Negative for blurred vision.   Respiratory:  Negative for shortness of breath and wheezing.    Cardiovascular:  Negative for chest pain and palpitations.   Gastrointestinal:  Negative for abdominal pain, nausea and vomiting.   Genitourinary:  Negative for dysuria.   Neurological:  Positive for dizziness. Negative for speech change, seizures and headaches.      Vital Signs (Most Recent):  Temp: 97.7 °F (36.5 °C) (10/10/22 1500)  Pulse: 64 (10/10/22 1750)  Resp: 20 (10/10/22 1750)  BP: 106/65 (10/10/22 1750)  SpO2: (!) 93 % (10/10/22 1750)    Body mass index is 24.92 kg/m².  Weight: 63.8 kg (140 lb 10.5 oz) Vital Signs (24h Range):  Temp:  [97.7 °F (36.5 °C)-98.1 °F (36.7 °C)] 97.7 °F (36.5 °C)  Pulse:  [57-70] 64  Resp:  [14-20] 20  SpO2:  [87 %-97 %] 93 %  BP: (104-125)/(63-81) 106/65     Physical Exam  Constitutional:       General: She is not in acute distress.  HENT:      Head: Normocephalic and atraumatic.   Eyes:      Extraocular Movements: Extraocular movements intact.      Conjunctiva/sclera: Conjunctivae normal.      Pupils: Pupils are equal, round, and reactive to light.   Cardiovascular:      Rate and Rhythm: Normal rate.      Heart sounds: No murmur heard.  Pulmonary:      Effort: Pulmonary effort is normal.      Breath sounds: Normal breath sounds.   Abdominal:      General: Abdomen is flat. Bowel sounds are normal.      Palpations: Abdomen is soft.   Skin:     General: Skin is warm and dry.   Neurological:      General: No focal deficit present.      Mental Status: She is alert and oriented to person, place, and time. Mental status is at baseline.   Psychiatric:         Mood and Affect: Mood normal.         Mechanical ventilation support:  Vent Mode: CPAP  PSV-Volume (09/28/22 1643)  Set Rate: 8 BPM (09/28/22 1545)  Vt Set: 500 mL (09/28/22 1545)  Pressure Support: 10 cmH20 (09/28/22 1643)  PEEP/CPAP: 5 cmH20 (09/28/22 1643)  Oxygen Concentration (%): 35 (09/28/22 1643)  Peak Airway Pressure: 18 cmH2O (09/28/22 1414)  Total Ve: 9.3 mL (09/28/22 1643)  F/VT Ratio<105 (RSBI): (!) 45.95 (09/28/22 1643)    Lines/Drains/Airways       Peripheral Intravenous Line  Duration                  Midline Catheter Insertion/Assessment  - Single Lumen 10/05/22 0839 Right basilic vein (medial side of arm) other (see comments) 5 days                    Significant Labs:    Lab Results   Component Value Date    WBC 12.8 (H) 10/10/2022    HGB 14.6 10/10/2022    HCT 42.5 10/10/2022    MCV 90.6 10/10/2022     10/10/2022         BMP  Lab Results   Component Value Date     10/10/2022    K 2.3 (LL) 10/10/2022    CO2 31 10/10/2022    BUN 16.6 10/10/2022    CREATININE 0.77 10/10/2022    CALCIUM 8.7 10/10/2022       ABG  No results for input(s): PH, PO2, PCO2, HCO3, BE in the last 168 hours.        Significant Imaging:  I have reviewed all pertinent imaging within the past 24 hours.        Assessment/Plan:     Assessment  V-tach status post defibrillation by LifeVest 2/2 hypokalemia  Hypotension 2/2 dehydration, medication induced  Hypokalemia  Severe aortic insufficiency, ascending aortic aneurysm, dilated left ventricle s/p aortic valve repair and ascending aortic aneurysm resection and repair (09/28/2022)  Hypertension      Plan  1. Patient initially hypotensive but improved fluids.  Monitor patient closely in ICU.  Cardiology was consulted and is considering performing left heart catheterization if patient is to go into V-tach again with appropriate correction lytes.  Echocardiogram from 10/06/2022 reveals EF 20-25%. Repeat echocardiogram is pending interpretation.  EKG revealed sinus rhythm with first-degree AV block, with T-wave inversions in leads II, III, AVF    2. Patient  was hypotensive with blood pressure 70/43 responded to 500 cc bolus of fluids to 110/73.  Continue to monitor.  Keep SBP greater than 90 and MAP greater than 65    3. Patient's potassium 2.3 will give 60 mEq of potassium repeat BMP at 12:00 a.m. replenish potassium if necessary.  Electrolytes goes potassium replenish if less than 4, Mag if less than 2 and phosphorus if less than 3.    4. CT surgery is on board    5. currently on Lasix 40 mg q.12, metoprolol succinate mg daily, Entresto 24-26 mg b.i.d. will blood pressure medications tinnitus patient was hypotensive which possibly could have been medication induced since patient blood pressure continued to decline and after receiving 40 mg dose of Lasix IV at 5:00 p.m..      DVT Prophylaxis:  Eliquis 5 mg b.i.d.  GI Prophylaxis: Pepcid 20 mg, sucralfate 1 g     Greater than 30 minutes of critical care was time spent personally by me on the following activities: development of treatment plan with patient or surrogate and bedside caregivers, discussions with consultants, evaluation of patient's response to treatment, examination of patient, ordering and performing treatments and interventions, ordering and review of laboratory studies, ordering and review of radiographic studies, pulse oximetry, re-evaluation of patient's condition.  This critical care time did not overlap with that of any other provider or involve time for any procedures.     Florentin Sanchez MD  Pulmonary Critical Care Medicine  [facility] - 3rd Floor Medical Telemetry

## 2022-10-11 NOTE — CONSULTS
[facility] - [facility] East ICU  EP Cardiology  Consult Note    Patient Name: Sydney Bacon  MRN: [id number]  Admission Date: 9/28/2022  Hospital Length of Stay: 13 days  Code Status: Full Code   Attending Provider: Wing Edmonds IV, MD   Consulting Provider: Beto Valencia [facility]  Primary Care Physician: Valente Bacon MD  Principal Problem:<principal problem not specified>    Patient information was obtained from patient and past medical records.     Subjective:     Chief Complaint:  Consulted for ICD evaluation    Brief HPI: Ms. Bacon is a 75 y/o female who is known to [facility], Dr. Mason. She was recently worked up for VHD and found to have a AAA and Mod AR, MI/MR. She was referred to [facility] for evaluation of surgical intervention. On 9.28.22 she underwent a AAA Resection and Placement of 32mm Interposition Graft with AVR 25mm Mosaic Porcine Valve as well as a BOB Ligation with EndoStapler. She tolerated the procedure well and was admitted to ICU post operatively. She became tachycardic and was found to be in A.Fib with RVR.  She underwent MING with successful cardioversion.  Patient was stepped out of the unit and on for awaiting rehab placement.  On 10/10/2022 patient had VT arrest and was successfully cardioverted by LifeVest x2.  Repeat limited echo revealed EF 25-30%.  EP has been consulted for ICD evaluation.    PMH: VHD (Mod AR, MI/MR), Chronic Systolic HF, Hypothyroidism, HTN, HLD  PSH: Cataract Surgery, Angiogram   Family History: Father, D, AAA; Mother, D, CVA, CAD  Social History: Denies alcohol, tobacco, or illicit drug use.      Previous Cardiac Diagnostics:   Echo Limited 10.10.22  TDS due to poor acoustical windows and off-axis views;  Severely decreased LV systolic function.  The visually estimated ejection fraction is 25-30%; suboptimal view to measure.  There is a bioprosthetic aortic valve present. Peak AV 2.4 m/sec. The aortic valve mean gradient is 12 mmHg with a dimensionless  index of 0.76.    Echo Limited 10.6.22  Concentric hypertrophy and severely decreased systolic function.    The estimated ejection fraction is 20-25%.    Limited study to assess LV function     MING w/ DCCV 10.5.22:  LV systolic function 25-30%.  No intracardiac thrombus is present.  BOB is absent consistent with BOB surgical ligation at the time of AVR. Smoke seen in LA  Well seated bioprosthetic AV without significant stenosis or perivalvular leak.   Successful cardioversion x 1  Recommendations:  Switch to po amiodarone with tapering dose and with holding parameters if HR <50 bpm  Short term Eliquis 5 mg po bid for 30 days only.   BOB is completely ligated, no need for long term OAC.  Continue IV diuresis, SAO2 92-93% before starting the procedure.  Repeat Limited echo in AM with Definity contrast to reassess LVEF, If LVEF < 35%, consider LifeVest prior to discharge.     [facility] 9.7.22:  Normal Coronaries  LVEF 45%     ECHO 7.21.22:  The study quality is good.   The left ventricle is moderately enlarged. Normal wall thicknesses. Global left ventricular systolic function is mildly decreased. The left ventricular ejection fraction is 45%. The left ventricle diastolic function is impaired (Grade I) with normal left atrial pressure.  The left atrium is moderately enlarged.  Dilation of the aortic root and ascending aorta is noted. Aortic root diameter is 3.8 cms. Ascending aorta diameter is 4.1 cms.   Severe (4+) eccentric aortic regurgitation. KPY=[id number]. Diastolic flow reversal is seen in the descending aorta.  Mild (1+) mitral regurgitation. Mild (1+) tricuspid regurgitation.   The estimated pulmonary artery systolic pressure is 24 mmHg assuming a right atrial pressure of 3 mmHg.      PET 5.18.18:  This is an abnormal perfusion study.   This scan is suggestive of moderate risk for future cardiovascular events.   Small partially reversible perfusion abnormality of moderate intensity in the apical segment. Small  reversible perfusion abnormality of moderate intensity in the anterior septal region. Small fixed perfusion abnormality of mild intensity in the apical inferior segment.   The left ventricular cavity is noted to be markedly enlarged on the stress studies. The stress left ventricular ejection fraction was calculated to be 36% and left ventricular global function is moderately reduced. The rest left ventricular cavity is noted to be moderately enlarged. The rest left ventricular ejection fraction was calculated to be 31% and rest left ventricular global function is moderately reduced.   When compared to the resting ejection fraction (31%), the stress ejection fraction (36%) has increased.   The study quality is good.        Review of patient's allergies indicates:   Allergen Reactions    Ace inhibitors     Aspirin     Atorvastatin     Penicillins        No current facility-administered medications on file prior to encounter.     Current Outpatient Medications on File Prior to Encounter   Medication Sig    b complex vitamins tablet Take 1 tablet by mouth once daily.    calcium citrate (CALCITRATE) 200 mg (950 mg) tablet Take 1 tablet by mouth once daily.    coenzyme Q10 100 mg capsule Take 100 mg by mouth once daily.    enzymes,digestive (DIGESTIVE ENZYMES ORAL) Take 1 capsule by mouth once daily.    famotidine (PEPCID) 20 MG tablet Take 20 mg by mouth daily as needed for Heartburn.    furosemide (LASIX) 40 MG tablet Take 40 mg by mouth once daily.    glucos sul 2KCl/msm/chond/C/Mn (GLUCOSAMINE CHONDROITIN ORAL) Take 1 tablet by mouth Daily.    levothyroxine (SYNTHROID) 75 MCG tablet Take 75 mcg by mouth once daily.    loratadine (CLARITIN) 10 mg tablet Take 10 mg by mouth once daily.    losartan (COZAAR) 100 MG tablet Take 100 mg by mouth once daily.    metoprolol succinate (TOPROL-XL) 50 MG 24 hr tablet Take 50 mg by mouth once daily.    multivitamin with minerals tablet Take 1 tablet by mouth once daily.     potassium chloride SA (K-DUR,KLOR-CON) 20 MEQ tablet Take 20 mEq by mouth once daily.    raloxifene (EVISTA) 60 mg tablet Take 60 mg by mouth once daily.    simvastatin (ZOCOR) 20 MG tablet Take 20 mg by mouth nightly.    spironolactone (ALDACTONE) 25 MG tablet Take 25 mg by mouth once daily.    vit A/vit C/vit E/zinc/copper (PRESERVISION AREDS ORAL) Take 1 capsule by mouth 2 (two) times daily.    vitamin D (VITAMIN D3) 1000 units Tab Take 1,000 Units by mouth 2 (two) times a day.       Review of Systems:  Review of Systems   Constitutional: Negative.    HENT: Negative.     Eyes: Negative.    Respiratory: Negative.     Cardiovascular: Negative.    Gastrointestinal: Negative.    Endocrine: Negative.    Genitourinary: Negative.    Musculoskeletal: Negative.    Skin: Negative.    Allergic/Immunologic: Negative.    Neurological: Negative.    Hematological: Negative.    Psychiatric/Behavioral: Negative.       Objective:     Vital Signs (Most Recent):  Temp: 98.8 °F (37.1 °C) (10/11/22 0800)  Pulse: (!) 51 (10/11/22 0900)  Resp: 15 (10/11/22 0900)  BP: (!) 115/92 (10/11/22 0900)  SpO2: 97 % (10/11/22 0900)   Vital Signs (24h Range):  Temp:  [97.5 °F (36.4 °C)-99 °F (37.2 °C)] 98.8 °F (37.1 °C)  Pulse:  [45-93] 51  Resp:  [14-25] 15  SpO2:  [84 %-98 %] 97 %  BP: ()/(45-92) 115/92     Weight: 63.1 kg (139 lb 1.8 oz)  Body mass index is 24.64 kg/m².    SpO2: 97 %  O2 Device (Oxygen Therapy): nasal cannula      Intake/Output Summary (Last 24 hours) at 10/11/2022 1017  Last data filed at 10/11/2022 0548  Gross per 24 hour   Intake 1385 ml   Output 550 ml   Net 835 ml       Lines/Drains/Airways       Peripheral Intravenous Line  Duration                  Midline Catheter Insertion/Assessment  - Single Lumen 10/05/22 0839 Right basilic vein (medial side of arm) other (see comments) 6 days                      Significant Labs: CMP   Recent Labs   Lab 10/10/22  1759 10/11/22  0023 10/11/22  0111 10/11/22  0827    138  139 139   K 2.3* 3.0* 3.0* 4.0   CO2 31 27 28 30   BUN 16.6 14.7 14.8 13.3   CREATININE 0.77 0.75 0.70 0.70   CALCIUM 8.7 8.3* 8.3* 8.2*   ALBUMIN 3.2*  --  2.8*  --    BILITOT 0.8  --  0.6  --    ALKPHOS 132  --  107  --    AST 26  --  21  --    ALT 20  --  17  --     and CBC   Recent Labs   Lab 10/10/22  1759 10/11/22  0112   WBC 12.8* 12.4*   HGB 14.6 13.4   HCT 42.5 40.3    334         Significant Imaging:         EKG:        Telemetry:  SB    Physical Exam:  Physical Exam  Constitutional:       Appearance: Normal appearance.   HENT:      Head: Normocephalic.      Mouth/Throat:      Mouth: Mucous membranes are moist.   Eyes:      Extraocular Movements: Extraocular movements intact.   Cardiovascular:      Rate and Rhythm: Regular rhythm. Bradycardia present.      Pulses: Normal pulses.   Pulmonary:      Effort: Pulmonary effort is normal.      Breath sounds: Decreased breath sounds present.   Abdominal:      Palpations: Abdomen is soft.   Musculoskeletal:         General: Normal range of motion.   Skin:     General: Skin is warm and dry.      Capillary Refill: Capillary refill takes less than 2 seconds.      Comments: Midline Sternotomy SIA C/D/I   Neurological:      General: No focal deficit present.      Mental Status: She is alert and oriented to person, place, and time.   Psychiatric:         Mood and Affect: Mood normal.         Behavior: Behavior normal.       Home Medications:   No current facility-administered medications on file prior to encounter.     Current Outpatient Medications on File Prior to Encounter   Medication Sig Dispense Refill    b complex vitamins tablet Take 1 tablet by mouth once daily.      calcium citrate (CALCITRATE) 200 mg (950 mg) tablet Take 1 tablet by mouth once daily.      coenzyme Q10 100 mg capsule Take 100 mg by mouth once daily.      enzymes,digestive (DIGESTIVE ENZYMES ORAL) Take 1 capsule by mouth once daily.      famotidine (PEPCID) 20 MG tablet Take 20 mg by  mouth daily as needed for Heartburn.      furosemide (LASIX) 40 MG tablet Take 40 mg by mouth once daily.      glucos sul 2KCl/msm/chond/C/Mn (GLUCOSAMINE CHONDROITIN ORAL) Take 1 tablet by mouth Daily.      levothyroxine (SYNTHROID) 75 MCG tablet Take 75 mcg by mouth once daily.      loratadine (CLARITIN) 10 mg tablet Take 10 mg by mouth once daily.      losartan (COZAAR) 100 MG tablet Take 100 mg by mouth once daily.      metoprolol succinate (TOPROL-XL) 50 MG 24 hr tablet Take 50 mg by mouth once daily.      multivitamin with minerals tablet Take 1 tablet by mouth once daily.      potassium chloride SA (K-DUR,KLOR-CON) 20 MEQ tablet Take 20 mEq by mouth once daily.      raloxifene (EVISTA) 60 mg tablet Take 60 mg by mouth once daily.      simvastatin (ZOCOR) 20 MG tablet Take 20 mg by mouth nightly.      spironolactone (ALDACTONE) 25 MG tablet Take 25 mg by mouth once daily.      vit A/vit C/vit E/zinc/copper (PRESERVISION AREDS ORAL) Take 1 capsule by mouth 2 (two) times daily.      vitamin D (VITAMIN D3) 1000 units Tab Take 1,000 Units by mouth 2 (two) times a day.         Current Inpatient Medications:    Current Facility-Administered Medications:     0.9%  NaCl infusion (for blood administration), , Intravenous, Q24H PRN, Wing Edmonds IV, MD    acetaminophen tablet 650 mg, 650 mg, Oral, Q4H PRN, Wing Edmonds IV, MD, 650 mg at 10/11/22 0404    albumin human 5% bottle 12.5 g, 12.5 g, Intravenous, PRN, ROSANGELA Alfonso, Stopped at 09/29/22 0258    [START ON 10/12/2022] amiodarone tablet 200 mg, 200 mg, Oral, Daily, LOREN Gore    bisacodyL suppository 10 mg, 10 mg, Rectal, Daily PRN, iWng Edmonds IV, MD, 10 mg at 10/08/22 1700    calcium gluconate 1 g in NS IVPB (premixed), 1 g, Intravenous, PRN, ROSANGELA Alfonso    calcium gluconate 1 g in NS IVPB (premixed), 2 g, Intravenous, PRN, Eliseo Tor, PA    calcium gluconate 1 g in NS IVPB (premixed), 3 g, Intravenous, PRN, ROSANGELA Alfonso    dextrose  10% bolus 125 mL, 12.5 g, Intravenous, PRN, Wing Edmonds IV, MD    dextrose 10% bolus 250 mL, 25 g, Intravenous, PRN, Wing Edmonds IV, MD    dextrose 5 % and 0.45 % NaCl with KCl 20 mEq infusion, , Intravenous, Continuous, Lenin Burns PA-C, Last Rate: 75 mL/hr at 10/11/22 0916, New Bag at 10/11/22 0916    docusate sodium capsule 100 mg, 100 mg, Oral, BID, ROSANGELA Alfonso, 100 mg at 10/10/22 2100    enoxaparin injection 60 mg, 1 mg/kg, Subcutaneous, Q12H, ANNA Tuttle-BC, 60 mg at 10/11/22 0945    famotidine (PF) injection 20 mg, 20 mg, Intravenous, Daily, ROSANGELA Alfonso, 20 mg at 10/11/22 0853    folic acid tablet 1 mg, 1 mg, Oral, Daily, RSOANGELA Alfonso, 1 mg at 10/11/22 0853    furosemide injection 40 mg, 40 mg, Intravenous, Q12H, JUNE Ramirez, 40 mg at 10/11/22 0345    HYDROcodone-acetaminophen 5-325 mg per tablet 1 tablet, 1 tablet, Oral, Q4H PRN, ROSANGELA Alfonso, 1 tablet at 10/09/22 2048    lactulose 10 gram/15 ml solution 20 g, 20 g, Oral, Q6H PRN, ROSANGELA Alfonso    loperamide capsule 4 mg, 4 mg, Oral, Once, ROSANGELA Alfonso    magnesium sulfate 2g in water 50mL IVPB (premix), 2 g, Intravenous, PRN, ROSANGELA Alfonso    magnesium sulfate 2g in water 50mL IVPB (premix), 4 g, Intravenous, PRN, ROSANGELA Alfonso    metoclopramide HCl injection 5 mg, 5 mg, Intravenous, Q6H PRN, ROSANGELA Alfonso    metoprolol succinate (TOPROL-XL) 24 hr tablet 100 mg, 100 mg, Oral, Daily, ANNA Tuttle-BC, 100 mg at 10/10/22 0858    morphine injection 2 mg, 2 mg, Intravenous, PRN, ROSANGELA Alfonso    ondansetron injection 8 mg, 8 mg, Intravenous, Q8H PRN, ROSANGELA Alfonso, 8 mg at 10/10/22 2031    polyethylene glycol packet 17 g, 17 g, Oral, BID PRN, Wing Edmonds IV, MD, 17 g at 10/08/22 2013    potassium chloride 20 mEq in 100 mL IVPB (FOR CENTRAL LINE ADMINISTRATION ONLY), 20 mEq, Intravenous, PRN, ROSANGELA Alfonso, Last Rate: 50 mL/hr at 10/05/22 0800, Rate Verify at 10/05/22 0800    potassium  chloride 20 mEq in 100 mL IVPB (FOR CENTRAL LINE ADMINISTRATION ONLY), 40 mEq, Intravenous, PRN, ROSANGELA Alfonso, Last Rate: 25 mL/hr at 10/11/22 0120, 40 mEq at 10/11/22 0120    sacubitriL-valsartan 24-26 mg per tablet 1 tablet, 1 tablet, Oral, BID, Beto Valencia, AGACNP-BC, 1 tablet at 10/10/22 0858    senna tablet 2 tablet, 2 tablet, Oral, Daily PRN, Wing Edmonds IV, MD, 2 tablet at 10/09/22 1142    senna-docusate 8.6-50 mg per tablet 2 tablet, 2 tablet, Oral, Once, Wing Edmonds IV, MD    sodium chloride 0.9% flush 10 mL, 10 mL, Intravenous, PRN, Ciro Rg MD    Flushing PICC Protocol, , , Until Discontinued **AND** sodium chloride 0.9% flush 10 mL, 10 mL, Intravenous, Q6H, 10 mL at 10/11/22 0600 **AND** sodium chloride 0.9% flush 10 mL, 10 mL, Intravenous, PRN, Yun Rivera NP    sodium chloride 0.9% flush 2 mL, 2 mL, Intravenous, PRN, ROSANGELA Alfonso, 2 mL at 10/07/22 1158    sodium phosphate 15 mmol in dextrose 5 % 250 mL IVPB, 15 mmol, Intravenous, PRN, ROSANGELA Alfonso    sodium phosphate 20.01 mmol in dextrose 5 % 250 mL IVPB, 20.01 mmol, Intravenous, PRN, ROSANGELA Alfonso    sodium phosphate 30 mmol in dextrose 5 % 250 mL IVPB, 30 mmol, Intravenous, PRN, ROSANGELA Alfonso    sucralfate tablet 1 g, 1 g, Oral, QID (AC & HS), ROSANGELA Alfonso, 1 g at 10/11/22 0645           Assessment:     IMPRESSION:  VT arrest/successful cardioversion secondary to hypokalemia  Hypokalemia, Improved  VHD (Mod AR, Mild MI/MR)    - s/p (9.28.22) - AVR 25mm Mosaic Porcine Valve  AAA    - s/p (9.28.22) - AAA Resection and Placement of 32mm Interposition Graft  PAF with RVR    - CHADsVASc - 5 Points - 7.2% Stroke Risk per Year     - s/p BOB Ligation (9.28.22) with Endostapler  Acute on Chronic Systolic HF/EF 20-25%  Hx of Hypothyroidism  HTN  HLD  No Hx of GIB    PLAN:     PLAN:  Continue Amio oral load for now  Decrease Toprol XL to 50mg daily, with hold parameters secondary bradycardia  Continue Entresto 24/26mg  BID with hold parameters  No ASA Secondary to Allergy  Ok to resume Eliquis, will still need 30 day course for OAC secondary recent DCCV for Afib.  BOB is completely ligated, no need for long term OAC.  Keep K >4 and Mg > 2, replete as needed  VT arrest secondary to hypokalemia, will hold off on ICD at this time given reversible cause. Continue LifeVest at this time.    I,Mateo Ronquillo MD,performed the substantive portion of this visit. I had a face-to-face time with the patient on 10.11.22. I reviewed and agree with the nurse practitioner's history, physical exam and plan of care.       Physical exam:    CV: RRR  Resp: CTA B  Extremities: No CC     Medical decision making:     Pt w/ Tdp due to hypokalemia.  ICD not indicated at this time.  Would replace and maintain lytes at appropriate levels.  Suggest DC w/ LifeVest to see if EF improves on medical therapy.  F/u w/ me as outpatient.     Thank you for your consult.     Beto Valencia [facility]-BC  Cardiology  [facility] - [facility] East ICU  10/11/2022 10:17 AM

## 2022-10-11 NOTE — PROGRESS NOTES
10/11/22 1030   Pre Exercise Vitals   /50   Pulse 57   Supplemental O2? Yes   O2 Device nasal cannula   O2 Flow (L/min) 2   SpO2 98 %   During Exercise Vitals   Supplemental O2? No   Distance Walked 125 feet   Post Exercise Vitals   /57   Pulse 60   Supplemental O2? No   SpO2 96 %   Modality   Modality   (rollator)   Communicated with nurse prior to activity.   Min assist.  Gait steady with rollator.  Wearing lifevest.  Unable to obtain 02 sat during ambulation due to cold fingers (monitors x2 used).  Pt denies TRIANA.  Upon return to room 02 sat 96% on room air.  Encouraged increased activity and use of IS.  Sternal precautions reviewed and maintained.   Bilat knee high TEDS applied.

## 2022-10-11 NOTE — PLAN OF CARE
Jhonathan followed up with Doreen regarding LifeVest. JHONATHAN was told that a representative will be following up with pt to switch out her belt and monitor. JHONATHAN will follow up with Candi WILSON regarding SNF placement.

## 2022-10-11 NOTE — NURSING
"Patient noted to go into Vtach on the tele monitor. Nurse entered room and found patient unresponsive with Lifevest alarm sounding. Staff assist called. RRT called. Lifevest shocked patient. Patient immediately responded stating her name, , where she was. Pt stated she was attempting to get up to the bathroom and began to feel weak, saw some dark spots, and then fainted. VSS. /73. Heart rate 65. O2 sat 88% placed patient on 4L NC. Approximately two minutes later patient began to feel weak and see spots again. Pt went unresponsive for a second time. Code blue button pressed. Lifevest shocked patient for the second time. Patient immediately became responsive. Dr. Olivares ordered stat labs and EKG which showed patient was in NSR with a first degree AV block with a prolonged QT. Paged CV surgery. ROSANGELA Gomes ordered stat echo and reconsult cis. VSS.     @1815 BP noted to be 76/44. Began NS. Paged Cv surgery and [facility]. Spoke with ROSANGELA Braun and requested patient to be transferred to the ICU. [facility] ordered 1000L bolus of NS. Labs came back. K 2.3. Ordered 40 IV K and recheck potassium and replace until greater than 4. Ordered D5.45NS with 20 mEq @ 75/hour. Requested to start dopamine @ 5 mcg/min however pressure was 110/53 @ that time. ICU resident Dr. Sanchez came to evaluate patient and spoke with Lenin via phone call. ICU resident stated "if the lifevest shocks her again, then we will take her to the unit". After speaking with multiple nurses regarding the safety of the patient remaining on the floor with a 6 to 1 ratio. Resident still adamant that patient remain on the floor and get fluids and replace electrolytes and call him back if needed, then he left the floor. Night shift charge nurse spoke with House supervisor about the concern for the safety of the patient. ICU resident Dr. Sanchez then returned to floor stating they will accept the patient. Patient transferred to ICU. Report given to Otoniel.   "

## 2022-10-11 NOTE — PROGRESS NOTES
CT SURGERY PROGRESS NOTE  Sydney Coxot  76 y.o.  1945    Patients Procedure: Procedure(s) (LRB):  REPLACEMENT, AORTIC VALVE (N/A)  REPAIR, AORTA, ASCENDING (N/A)    Subjective  Interval History: [facility] and was shocked multiple times yesterday afternoon. Cardiology reconsulted and ECHO, and EKG and Labs were ordered. Showed a potassium of 2.3, EKG showed st abnormalities and LBBB. Patient K repletion initiated and patient moved to ICU and given fluids.    Today the patient is table in ICU and in NSR. She currently feels good. Family had questions about EF seen on last ECHO and what happened yesterday.K is 3.0 and is continuing to be repleted. Cardiology plans to place ICD on Thursday.    Review of Systems   Constitutional: Negative.    Respiratory:  Negative for cough, sputum production and shortness of breath.    Cardiovascular:  Negative for chest pain, palpitations, claudication and leg swelling.   Gastrointestinal:  Negative for abdominal pain, nausea and vomiting.   Genitourinary: Negative.    Skin: Negative.         Incision C/D/I     Medication List  Infusions   dextrose 5 % and 0.45 % NaCl with KCl 20 mEq 75 mL/hr at 10/11/22 0916       Scheduled   [START ON 10/12/2022] amiodarone  200 mg Oral Daily    docusate sodium  100 mg Oral BID    enoxaparin  1 mg/kg Subcutaneous Q12H    famotidine (PF)  20 mg Intravenous Daily    folic acid  1 mg Oral Daily    furosemide (LASIX) injection  40 mg Intravenous Q12H    loperamide  4 mg Oral Once    metoprolol succinate  100 mg Oral Daily    sacubitriL-valsartan  1 tablet Oral BID    senna-docusate 8.6-50 mg  2 tablet Oral Once    sodium chloride 0.9%  10 mL Intravenous Q6H    sucralfate  1 g Oral QID (AC & HS)       Objective:  Recent Vitals:  Temp:  [97.5 °F (36.4 °C)-99 °F (37.2 °C)] 98.8 °F (37.1 °C)  Pulse:  [45-93] 51  Resp:  [14-25] 15  SpO2:  [84 %-98 %] 97 %  BP: ()/(45-92) 115/92    Physical Exam  Vitals and nursing note reviewed.    Constitutional:       General: She is awake. She is not in acute distress.     Appearance: Normal appearance. She is not toxic-appearing or diaphoretic.   HENT:      Head: Normocephalic and atraumatic.   Eyes:      Extraocular Movements: Extraocular movements intact.      Pupils: Pupils are equal, round, and reactive to light.   Cardiovascular:      Rate and Rhythm: Normal rate and regular rhythm.      Pulses: Normal pulses.           Radial pulses are 2+ on the right side and 2+ on the left side.        Dorsalis pedis pulses are 2+ on the right side and 2+ on the left side.      Heart sounds: Normal heart sounds.   Pulmonary:      Effort: Pulmonary effort is normal.      Breath sounds: Normal breath sounds.   Musculoskeletal:      Right lower leg: No edema.      Left lower leg: No edema.   Skin:     General: Skin is warm and dry.      Comments: INCISION C/D/I   Neurological:      General: No focal deficit present.      Mental Status: She is alert and oriented to person, place, and time.   Psychiatric:         Mood and Affect: Mood and affect normal.        I/O last 24 hrs:  Intake/Output - Last 3 Shifts         10/09 0700  10/10 0659 10/10 0700  10/11 0659 10/11 0700  10/12 0659    P.O. 620 480     I.V. (mL/kg)  705 (11.1)     IV Piggyback  200     Total Intake(mL/kg) 620 (9.7) 1385 (21.7)     Urine (mL/kg/hr)  550 (0.4)     Stool       Total Output  550     Net +620 +835            Urine Occurrence 3 x 3 x     Stool Occurrence 1 x 2 x             Labs  BMP:   Recent Labs   Lab 10/11/22  0023 10/11/22  0111 10/11/22  0827      < > 139   K 3.0*   < > 4.0   CO2 27   < > 30   BUN 14.7   < > 13.3   CREATININE 0.75   < > 0.70   CALCIUM 8.3*   < > 8.2*   MG 2.20  --   --     < > = values in this interval not displayed.       CBC:   Recent Labs   Lab 10/11/22  0112   WBC 12.4*   RBC 4.41   HGB 13.4   HCT 40.3      MCV 91.4   MCH 30.4   MCHC 33.3       All pertinent labs from the last 24 hours have been  reviewed.      Imaging:   CXR: X-Ray Chest AP Portable    Result Date: 9/29/2022  Mild bibasilar atelectasis with possible small pleural effusions. Electronically signed by: Ivan Chan Date:    09/29/2022 Time:    07:20   I have reviewed all pertinent imaging results/findings within the past 24 hours.        ASSESSMENT/PLAN:  - ICU care  - cardiology - ICD placement later this week and cardiac medical management   - IS/OOB/Ambulate, PT/OT      Case and plan of care discussed with MD Eliseo Lentz PA-C

## 2022-10-11 NOTE — PROGRESS NOTES
Significant Event:     Code blue called overhead.    76-year-old female with recent admission for AVR, valve repair, reportedly found to have a run of V-tach on monitor.  Patient wearing a life vest which defibrillated the patient.  At the time arrival, patient back to her neurologic baseline.  Patient reports seeing spots prior to passing out and returned her neurologic baseline.  EKG obtained which showed AFib.  Rate of 64.  Prolonged QT with QTC of 534.  No STEMI.  Minimal ST depression in 3 and AVF.  She is currently on amiodarone for dysrhythmia.  Given 2 mg of magnesium.  Lab work obtained, last labs were on 10/07.  She denies any chest pain or shortness of breath.  Currently at her neurologic baseline.  Repeat vitals stable.  Will notify Cardiology as well as admitting physician.  All results discussed answered all questions time.

## 2022-10-12 VITALS
BODY MASS INDEX: 24.65 KG/M2 | HEART RATE: 84 BPM | SYSTOLIC BLOOD PRESSURE: 100 MMHG | WEIGHT: 139.13 LBS | OXYGEN SATURATION: 91 % | DIASTOLIC BLOOD PRESSURE: 53 MMHG | HEIGHT: 63 IN | RESPIRATION RATE: 27 BRPM | TEMPERATURE: 98 F

## 2022-10-12 PROBLEM — I35.0 NONRHEUMATIC AORTIC VALVE STENOSIS: Status: ACTIVE | Noted: 2022-10-12

## 2022-10-12 LAB
ALBUMIN SERPL-MCNC: 2.6 GM/DL (ref 3.4–4.8)
ALBUMIN/GLOB SERPL: 1 RATIO (ref 1.1–2)
ALP SERPL-CCNC: 90 UNIT/L (ref 40–150)
ALT SERPL-CCNC: 16 UNIT/L (ref 0–55)
AST SERPL-CCNC: 18 UNIT/L (ref 5–34)
BACTERIA SPEC CULT: NORMAL
BASOPHILS # BLD AUTO: 0.05 X10(3)/MCL (ref 0–0.2)
BASOPHILS NFR BLD AUTO: 0.5 %
BILIRUBIN DIRECT+TOT PNL SERPL-MCNC: 0.5 MG/DL
BUN SERPL-MCNC: 9.3 MG/DL (ref 9.8–20.1)
CALCIUM SERPL-MCNC: 8.3 MG/DL (ref 8.4–10.2)
CHLORIDE SERPL-SCNC: 105 MMOL/L (ref 98–107)
CO2 SERPL-SCNC: 28 MMOL/L (ref 23–31)
CREAT SERPL-MCNC: 0.65 MG/DL (ref 0.55–1.02)
EOSINOPHIL # BLD AUTO: 0.29 X10(3)/MCL (ref 0–0.9)
EOSINOPHIL NFR BLD AUTO: 3.1 %
ERYTHROCYTE [DISTWIDTH] IN BLOOD BY AUTOMATED COUNT: 14 % (ref 11.5–17)
GFR SERPLBLD CREATININE-BSD FMLA CKD-EPI: >60 MLS/MIN/1.73/M2
GLOBULIN SER-MCNC: 2.5 GM/DL (ref 2.4–3.5)
GLUCOSE SERPL-MCNC: 84 MG/DL (ref 82–115)
HCT VFR BLD AUTO: 37.4 % (ref 37–47)
HGB BLD-MCNC: 12.2 GM/DL (ref 12–16)
IMM GRANULOCYTES # BLD AUTO: 0.06 X10(3)/MCL (ref 0–0.04)
IMM GRANULOCYTES NFR BLD AUTO: 0.6 %
LYMPHOCYTES # BLD AUTO: 1.93 X10(3)/MCL (ref 0.6–4.6)
LYMPHOCYTES NFR BLD AUTO: 20.5 %
MAGNESIUM SERPL-MCNC: 2 MG/DL (ref 1.6–2.6)
MCH RBC QN AUTO: 30.4 PG (ref 27–31)
MCHC RBC AUTO-ENTMCNC: 32.6 MG/DL (ref 33–36)
MCV RBC AUTO: 93.3 FL (ref 80–94)
MONOCYTES # BLD AUTO: 1.32 X10(3)/MCL (ref 0.1–1.3)
MONOCYTES NFR BLD AUTO: 14 %
NEUTROPHILS # BLD AUTO: 5.8 X10(3)/MCL (ref 2.1–9.2)
NEUTROPHILS NFR BLD AUTO: 61.3 %
NRBC BLD AUTO-RTO: 0 %
PHOSPHATE SERPL-MCNC: 2.5 MG/DL (ref 2.3–4.7)
PLATELET # BLD AUTO: 287 X10(3)/MCL (ref 130–400)
PMV BLD AUTO: 9.5 FL (ref 7.4–10.4)
POTASSIUM SERPL-SCNC: 4 MMOL/L (ref 3.5–5.1)
PROT SERPL-MCNC: 5.1 GM/DL (ref 5.8–7.6)
RBC # BLD AUTO: 4.01 X10(6)/MCL (ref 4.2–5.4)
SODIUM SERPL-SCNC: 136 MMOL/L (ref 136–145)
WBC # SPEC AUTO: 9.4 X10(3)/MCL (ref 4.5–11.5)

## 2022-10-12 PROCEDURE — 25000003 PHARM REV CODE 250

## 2022-10-12 PROCEDURE — 85025 COMPLETE CBC W/AUTO DIFF WBC: CPT

## 2022-10-12 PROCEDURE — 25000003 PHARM REV CODE 250: Performed by: SPECIALIST

## 2022-10-12 PROCEDURE — 84100 ASSAY OF PHOSPHORUS: CPT

## 2022-10-12 PROCEDURE — 80053 COMPREHEN METABOLIC PANEL: CPT

## 2022-10-12 PROCEDURE — 25000003 PHARM REV CODE 250: Performed by: NURSE PRACTITIONER

## 2022-10-12 PROCEDURE — 99024 POSTOP FOLLOW-UP VISIT: CPT | Mod: POP,,,

## 2022-10-12 PROCEDURE — 83735 ASSAY OF MAGNESIUM: CPT

## 2022-10-12 PROCEDURE — 99024 PR POST-OP FOLLOW-UP VISIT: ICD-10-PCS | Mod: POP,,,

## 2022-10-12 PROCEDURE — 36415 COLL VENOUS BLD VENIPUNCTURE: CPT

## 2022-10-12 PROCEDURE — 94760 N-INVAS EAR/PLS OXIMETRY 1: CPT

## 2022-10-12 PROCEDURE — 97110 THERAPEUTIC EXERCISES: CPT

## 2022-10-12 RX ORDER — FAMOTIDINE 20 MG/1
20 TABLET, FILM COATED ORAL DAILY
Status: DISCONTINUED | OUTPATIENT
Start: 2022-10-12 | End: 2022-10-12 | Stop reason: HOSPADM

## 2022-10-12 RX ORDER — FUROSEMIDE 20 MG/1
20 TABLET ORAL DAILY
Qty: 30 TABLET | Refills: 11 | Status: ON HOLD | OUTPATIENT
Start: 2022-10-13 | End: 2022-10-21 | Stop reason: SDUPTHER

## 2022-10-12 RX ORDER — HYDROCODONE BITARTRATE AND ACETAMINOPHEN 5; 325 MG/1; MG/1
1 TABLET ORAL EVERY 6 HOURS PRN
Qty: 28 TABLET | Refills: 0 | Status: ON HOLD | OUTPATIENT
Start: 2022-10-12 | End: 2022-10-21 | Stop reason: HOSPADM

## 2022-10-12 RX ORDER — AMIODARONE HYDROCHLORIDE 200 MG/1
200 TABLET ORAL DAILY
Qty: 30 TABLET | Refills: 11 | Status: ON HOLD | OUTPATIENT
Start: 2022-10-13 | End: 2022-10-21 | Stop reason: SDUPTHER

## 2022-10-12 RX ADMIN — APIXABAN 5 MG: 5 TABLET, FILM COATED ORAL at 09:10

## 2022-10-12 RX ADMIN — ACETAMINOPHEN 650 MG: 325 TABLET, FILM COATED ORAL at 01:10

## 2022-10-12 RX ADMIN — POTASSIUM CHLORIDE 20 MEQ: 1500 TABLET, EXTENDED RELEASE ORAL at 09:10

## 2022-10-12 RX ADMIN — SUCRALFATE 1 G: 1 TABLET ORAL at 11:10

## 2022-10-12 RX ADMIN — FOLIC ACID 1 MG: 1 TABLET ORAL at 09:10

## 2022-10-12 RX ADMIN — AMIODARONE HYDROCHLORIDE 200 MG: 200 TABLET ORAL at 09:10

## 2022-10-12 RX ADMIN — FAMOTIDINE 20 MG: 20 TABLET, FILM COATED ORAL at 10:10

## 2022-10-12 RX ADMIN — FUROSEMIDE 20 MG: 20 TABLET ORAL at 09:10

## 2022-10-12 NOTE — DISCHARGE INSTRUCTIONS
Wear lifevest, take meds as ordered, watch for s/s of problems, call or be seen by provider asap if something arises.

## 2022-10-12 NOTE — PROGRESS NOTES
10/12/22 0850   Pre Exercise Vitals   /58   Pulse 75   Supplemental O2? No   SpO2 93 %   During Exercise Vitals   Pulse 102   Supplemental O2? No   SpO2 94 %   Distance Walked 160 feet   Post Exercise Vitals   /63   Pulse 90   Supplemental O2? No   SpO2 94 %   Modality   Modality   (rollator)   Communicated with nurse prior to activity.  Min assist sit to stand, maintains sternal precautions.  Gait steady with rollator.

## 2022-10-12 NOTE — PT/OT/SLP PROGRESS
OT attempted at 1520, pt sitting uic in chair dressed in clothing, all personal items packed awaiting discharge. Reviewed sternal pxns with UE/LE dressing and toileting tasks. Pt reported leaving hospital in any minute.

## 2022-10-12 NOTE — PROGRESS NOTES
"[facility] - [facility] ICU  Cardiology  Progress Note    Patient Name: Sydney Bacon  MRN: [id number]  Admission Date: 9/28/2022  Hospital Length of Stay: 14 days  Code Status: Full Code   Attending Physician: Wing Edmonds IV, MD   Primary Care Physician: Valente Bacon MD  Expected Discharge Date:   Principal Problem:<principal problem not specified>    Subjective:     Brief HPI: Ms. Bacon is a 75 y/o female who is known to [facility], Dr. Mason. She was recently worked up for VHD and found to have a AAA and Mod AR, MI/MR. She was referred to [facility] for evaluation of surgical intervention. On 9.28.22 she underwent a AAA Resection and Placement of 32mm Interposition Graft with AVR 25mm Mosaic Porcine Valve as well as a BOB Ligation with EndoStapler. She tolerated the procedure well and was admitted to ICU post operatively. She became tachycardic and was found to be in A.Fib with RVR. [facility] was consulted for this reason.     Hospital Course:   10.3.22: NAD. Remains in PAF/RVR. IV Infiltration Last PM with Amio. Back on IV Amio at 0.5mg/min. "I am ok." Denies SOB and Palps. + CP/Incisional.   10.4.22: NAD. Denies CP, SOB and Palps. "I am ok." Sitting in Bedside Chair. Remains in PAF with RVR (110s-120s)  10.5.22: NAD. Denies Cp, SOB, or palps. Sitting up in chair. NPO for scheduled MING/DCCV today. PAF w/ RVR on tele. -1428.4/24 hrs.   10.6.22: NAD. Denies CP, SOB, or palps. Sitting up in chair. SR on tele.   10.7.22: NAD noted. Denies CP/SOB/Palps. SR on tele. Awaiting rehab placement  10.11.22: Reconsulted for VT cardiac arrest. Patient had VT cardiac arrest with successful cardioversion by LifeVest. Patient was transferred back to ICU for close monitoring. VSS. Denies CP/SOB/palps.  10.12.22: NAD noted. VSS. SR on tele. SNF pending. Denies CP/SOB/Palps.    PMH: VHD (Mod AR, MI/MR), Chronic Systolic HF, Hypothyroidism, HTN, HLD  PSH: Cataract Surgery, Angiogram   Family History: Father, D, AAA; Mother, " D, CVA, CAD  Social History: Denies alcohol, tobacco, or illicit drug use.      Previous Cardiac Diagnostics:   Echo Limited 10.6.22  Concentric hypertrophy and severely decreased systolic function.    The estimated ejection fraction is 20-25%.    Limited study to assess LV function    MING w/ DCCV 10.5.22:  LV systolic function 25-30%.  No intracardiac thrombus is present.  BOB is absent consistent with BOB surgical ligation at the time of AVR. Smoke seen in LA  Well seated bioprosthetic AV without significant stenosis or perivalvular leak.   Successful cardioversion x 1  Recommendations:  Switch to po amiodarone with tapering dose and with holding parameters if HR <50 bpm  Short term Eliquis 5 mg po bid for 30 days only.   BOB is completely ligated, no need for long term OAC.  Continue IV diuresis, SAO2 92-93% before starting the procedure.  Repeat Limited echo in AM with Definity contrast to reassess LVEF, If LVEF < 35%, consider LifeVest prior to discharge.    [facility] 9.7.22:  Normal Coronaries  LVEF 45%     ECHO 7.21.22:  The study quality is good.   The left ventricle is moderately enlarged. Normal wall thicknesses. Global left ventricular systolic function is mildly decreased. The left ventricular ejection fraction is 45%. The left ventricle diastolic function is impaired (Grade I) with normal left atrial pressure.  The left atrium is moderately enlarged.  Dilation of the aortic root and ascending aorta is noted. Aortic root diameter is 3.8 cms. Ascending aorta diameter is 4.1 cms.   Severe (4+) eccentric aortic regurgitation. [id number]. Diastolic flow reversal is seen in the descending aorta.  Mild (1+) mitral regurgitation. Mild (1+) tricuspid regurgitation.   The estimated pulmonary artery systolic pressure is 24 mmHg assuming a right atrial pressure of 3 mmHg.      PET 5.18.18:  This is an abnormal perfusion study.   This scan is suggestive of moderate risk for future cardiovascular events.   Small partially  reversible perfusion abnormality of moderate intensity in the apical segment. Small reversible perfusion abnormality of moderate intensity in the anterior septal region. Small fixed perfusion abnormality of mild intensity in the apical inferior segment.   The left ventricular cavity is noted to be markedly enlarged on the stress studies. The stress left ventricular ejection fraction was calculated to be 36% and left ventricular global function is moderately reduced. The rest left ventricular cavity is noted to be moderately enlarged. The rest left ventricular ejection fraction was calculated to be 31% and rest left ventricular global function is moderately reduced.   When compared to the resting ejection fraction (31%), the stress ejection fraction (36%) has increased.   The study quality is good.    Review of Systems   Constitutional: Positive for malaise/fatigue.   Cardiovascular:  Negative for chest pain, irregular heartbeat and palpitations.   Respiratory:  Negative for cough and shortness of breath.    All other systems reviewed and are negative.    Objective:     Vital Signs (Most Recent):  Temp: 98 °F (36.7 °C) (10/12/22 0500)  Pulse: (!) 55 (10/12/22 0800)  Resp: 20 (10/12/22 0800)  BP: (!) 103/52 (10/12/22 0900)  SpO2: (!) 91 % (10/12/22 0800)   Vital Signs (24h Range):  Temp:  [97.3 °F (36.3 °C)-98.3 °F (36.8 °C)] 98 °F (36.7 °C)  Pulse:  [52-67] 55  Resp:  [14-22] 20  SpO2:  [90 %-96 %] 91 %  BP: ()/(41-53) 103/52     Weight: 63.1 kg (139 lb 1.8 oz)  Body mass index is 24.64 kg/m².    SpO2: (!) 91 %  O2 Device (Oxygen Therapy): room air    No intake or output data in the 24 hours ending 10/12/22 1101    Lines/Drains/Airways       Peripheral Intravenous Line  Duration                  Midline Catheter Insertion/Assessment  - Single Lumen 10/05/22 0839 Right basilic vein (medial side of arm) other (see comments) 7 days                  Significant Labs:   Recent Results (from the past 72 hour(s))    Respiratory Culture    Collection Time: 10/10/22  9:26 AM    Specimen: Nasal Swab; NP MRSA   Result Value Ref Range    Respiratory Culture Rare normal respiratory radha    Comprehensive Metabolic Panel    Collection Time: 10/10/22  5:59 PM   Result Value Ref Range    Sodium Level 138 136 - 145 mmol/L    Potassium Level 2.3 (LL) 3.5 - 5.1 mmol/L    Chloride 96 (L) 98 - 107 mmol/L    Carbon Dioxide 31 23 - 31 mmol/L    Glucose Level 167 (H) 82 - 115 mg/dL    Blood Urea Nitrogen 16.6 9.8 - 20.1 mg/dL    Creatinine 0.77 0.55 - 1.02 mg/dL    Calcium Level Total 8.7 8.4 - 10.2 mg/dL    Protein Total 6.6 5.8 - 7.6 gm/dL    Albumin Level 3.2 (L) 3.4 - 4.8 gm/dL    Globulin 3.4 2.4 - 3.5 gm/dL    Albumin/Globulin Ratio 0.9 (L) 1.1 - 2.0 ratio    Bilirubin Total 0.8 <=1.5 mg/dL    Alkaline Phosphatase 132 40 - 150 unit/L    Alanine Aminotransferase 20 0 - 55 unit/L    Aspartate Aminotransferase 26 5 - 34 unit/L    eGFR >60 mls/min/1.73/m2   Troponin I    Collection Time: 10/10/22  5:59 PM   Result Value Ref Range    Troponin-I 0.105 (H) 0.000 - 0.045 ng/mL   CBC with Differential    Collection Time: 10/10/22  5:59 PM   Result Value Ref Range    WBC 12.8 (H) 4.5 - 11.5 x10(3)/mcL    RBC 4.69 4.20 - 5.40 x10(6)/mcL    Hgb 14.6 12.0 - 16.0 gm/dL    Hct 42.5 37.0 - 47.0 %    MCV 90.6 80.0 - 94.0 fL    MCH 31.1 (H) 27.0 - 31.0 pg    MCHC 34.4 33.0 - 36.0 mg/dL    RDW 13.8 11.5 - 17.0 %    Platelet 342 130 - 400 x10(3)/mcL    MPV 9.3 7.4 - 10.4 fL    Neut % 66.0 %    Lymph % 20.1 %    Mono % 11.9 %    Eos % 1.0 %    Basophil % 0.3 %    Lymph # 2.58 0.6 - 4.6 x10(3)/mcL    Neut # 8.5 2.1 - 9.2 x10(3)/mcL    Mono # 1.53 (H) 0.1 - 1.3 x10(3)/mcL    Eos # 0.13 0 - 0.9 x10(3)/mcL    Baso # 0.04 0 - 0.2 x10(3)/mcL    IG# 0.09 (H) 0 - 0.04 x10(3)/mcL    IG% 0.7 %    NRBC% 0.0 %   Magnesium    Collection Time: 10/10/22  5:59 PM   Result Value Ref Range    Magnesium Level 3.60 (H) 1.60 - 2.60 mg/dL   Phosphorus    Collection Time:  10/10/22  5:59 PM   Result Value Ref Range    Phosphorus Level 2.9 2.3 - 4.7 mg/dL   Echo    Collection Time: 10/10/22  6:13 PM   Result Value Ref Range    BSA 1.7 m2    EF 25 %    Left Ventricular Outflow Tract Mean Velocity 1.23 cm/s    Left Ventricular Outflow Tract Mean Gradient 8.00 mmHg    AV mean gradient 12 mmHg    AV Velocity Ratio 0.82     AV index (prosthetic) 0.76     MV mean gradient 1 mmHg    MV valve area p 1/2 method 7.59 cm2    E/A ratio 1.31     E wave deceleration time 230.00 msec    LVOT peak howard 1.94 m/s    LVOT peak VTI 31.80 cm    Ao peak howard 2.37 m/s    Ao VTI 42.1 cm    AV peak gradient 22 mmHg    MV peak gradient 2 mmHg    MV Peak E Howard 0.59 m/s    MV VTI 20.3 cm    MV stenosis pressure 1/2 time 29.00 ms    MV Peak A Howard 0.45 m/s    LV Systolic Volume 79.40 mL    LV Systolic Volume Index 47.5 mL/m2    LV Diastolic Volume 123.00 mL    LV Diastolic Volume Index 73.65 mL/m2   Basic Metabolic Panel    Collection Time: 10/11/22 12:23 AM   Result Value Ref Range    Sodium Level 138 136 - 145 mmol/L    Potassium Level 3.0 (L) 3.5 - 5.1 mmol/L    Chloride 102 98 - 107 mmol/L    Carbon Dioxide 27 23 - 31 mmol/L    Glucose Level 151 (H) 82 - 115 mg/dL    Blood Urea Nitrogen 14.7 9.8 - 20.1 mg/dL    Creatinine 0.75 0.55 - 1.02 mg/dL    BUN/Creatinine Ratio 20     Calcium Level Total 8.3 (L) 8.4 - 10.2 mg/dL    Anion Gap 9.0 mEq/L    eGFR >60 mls/min/1.73/m2   Magnesium    Collection Time: 10/11/22 12:23 AM   Result Value Ref Range    Magnesium Level 2.20 1.60 - 2.60 mg/dL   Phosphorus    Collection Time: 10/11/22 12:23 AM   Result Value Ref Range    Phosphorus Level 2.9 2.3 - 4.7 mg/dL   Comprehensive Metabolic Panel    Collection Time: 10/11/22  1:11 AM   Result Value Ref Range    Sodium Level 139 136 - 145 mmol/L    Potassium Level 3.0 (L) 3.5 - 5.1 mmol/L    Chloride 102 98 - 107 mmol/L    Carbon Dioxide 28 23 - 31 mmol/L    Glucose Level 144 (H) 82 - 115 mg/dL    Blood Urea Nitrogen 14.8 9.8 -  20.1 mg/dL    Creatinine 0.70 0.55 - 1.02 mg/dL    Calcium Level Total 8.3 (L) 8.4 - 10.2 mg/dL    Protein Total 6.1 5.8 - 7.6 gm/dL    Albumin Level 2.8 (L) 3.4 - 4.8 gm/dL    Globulin 3.3 2.4 - 3.5 gm/dL    Albumin/Globulin Ratio 0.8 (L) 1.1 - 2.0 ratio    Bilirubin Total 0.6 <=1.5 mg/dL    Alkaline Phosphatase 107 40 - 150 unit/L    Alanine Aminotransferase 17 0 - 55 unit/L    Aspartate Aminotransferase 21 5 - 34 unit/L    eGFR >60 mls/min/1.73/m2   CBC with Differential    Collection Time: 10/11/22  1:12 AM   Result Value Ref Range    WBC 12.4 (H) 4.5 - 11.5 x10(3)/mcL    RBC 4.41 4.20 - 5.40 x10(6)/mcL    Hgb 13.4 12.0 - 16.0 gm/dL    Hct 40.3 37.0 - 47.0 %    MCV 91.4 80.0 - 94.0 fL    MCH 30.4 27.0 - 31.0 pg    MCHC 33.3 33.0 - 36.0 mg/dL    RDW 13.7 11.5 - 17.0 %    Platelet 334 130 - 400 x10(3)/mcL    MPV 9.5 7.4 - 10.4 fL    Neut % 78.7 %    Lymph % 9.7 %    Mono % 10.6 %    Eos % 0.2 %    Basophil % 0.3 %    Lymph # 1.20 0.6 - 4.6 x10(3)/mcL    Neut # 9.8 (H) 2.1 - 9.2 x10(3)/mcL    Mono # 1.32 (H) 0.1 - 1.3 x10(3)/mcL    Eos # 0.03 0 - 0.9 x10(3)/mcL    Baso # 0.04 0 - 0.2 x10(3)/mcL    IG# 0.06 (H) 0 - 0.04 x10(3)/mcL    IG% 0.5 %    NRBC% 0.0 %   Basic Metabolic Panel    Collection Time: 10/11/22  8:27 AM   Result Value Ref Range    Sodium Level 139 136 - 145 mmol/L    Potassium Level 4.0 3.5 - 5.1 mmol/L    Chloride 105 98 - 107 mmol/L    Carbon Dioxide 30 23 - 31 mmol/L    Glucose Level 96 82 - 115 mg/dL    Blood Urea Nitrogen 13.3 9.8 - 20.1 mg/dL    Creatinine 0.70 0.55 - 1.02 mg/dL    BUN/Creatinine Ratio 19     Calcium Level Total 8.2 (L) 8.4 - 10.2 mg/dL    Anion Gap 4.0 mEq/L    eGFR >60 mls/min/1.73/m2   Potassium    Collection Time: 10/11/22  3:29 PM   Result Value Ref Range    Potassium Level 3.4 (L) 3.5 - 5.1 mmol/L   Phosphorus    Collection Time: 10/12/22  4:38 AM   Result Value Ref Range    Phosphorus Level 2.5 2.3 - 4.7 mg/dL   Magnesium    Collection Time: 10/12/22  4:38 AM   Result  Value Ref Range    Magnesium Level 2.00 1.60 - 2.60 mg/dL   Comprehensive Metabolic Panel    Collection Time: 10/12/22  4:38 AM   Result Value Ref Range    Sodium Level 136 136 - 145 mmol/L    Potassium Level 4.0 3.5 - 5.1 mmol/L    Chloride 105 98 - 107 mmol/L    Carbon Dioxide 28 23 - 31 mmol/L    Glucose Level 84 82 - 115 mg/dL    Blood Urea Nitrogen 9.3 (L) 9.8 - 20.1 mg/dL    Creatinine 0.65 0.55 - 1.02 mg/dL    Calcium Level Total 8.3 (L) 8.4 - 10.2 mg/dL    Protein Total 5.1 (L) 5.8 - 7.6 gm/dL    Albumin Level 2.6 (L) 3.4 - 4.8 gm/dL    Globulin 2.5 2.4 - 3.5 gm/dL    Albumin/Globulin Ratio 1.0 (L) 1.1 - 2.0 ratio    Bilirubin Total 0.5 <=1.5 mg/dL    Alkaline Phosphatase 90 40 - 150 unit/L    Alanine Aminotransferase 16 0 - 55 unit/L    Aspartate Aminotransferase 18 5 - 34 unit/L    eGFR >60 mls/min/1.73/m2   CBC with Differential    Collection Time: 10/12/22  4:38 AM   Result Value Ref Range    WBC 9.4 4.5 - 11.5 x10(3)/mcL    RBC 4.01 (L) 4.20 - 5.40 x10(6)/mcL    Hgb 12.2 12.0 - 16.0 gm/dL    Hct 37.4 37.0 - 47.0 %    MCV 93.3 80.0 - 94.0 fL    MCH 30.4 27.0 - 31.0 pg    MCHC 32.6 (L) 33.0 - 36.0 mg/dL    RDW 14.0 11.5 - 17.0 %    Platelet 287 130 - 400 x10(3)/mcL    MPV 9.5 7.4 - 10.4 fL    Neut % 61.3 %    Lymph % 20.5 %    Mono % 14.0 %    Eos % 3.1 %    Basophil % 0.5 %    Lymph # 1.93 0.6 - 4.6 x10(3)/mcL    Neut # 5.8 2.1 - 9.2 x10(3)/mcL    Mono # 1.32 (H) 0.1 - 1.3 x10(3)/mcL    Eos # 0.29 0 - 0.9 x10(3)/mcL    Baso # 0.05 0 - 0.2 x10(3)/mcL    IG# 0.06 (H) 0 - 0.04 x10(3)/mcL    IG% 0.6 %    NRBC% 0.0 %     Telemetry: PAF/RVR    Physical Exam  Constitutional:       Appearance: Normal appearance.   HENT:      Head: Normocephalic.      Mouth/Throat:      Mouth: Mucous membranes are moist.   Eyes:      Extraocular Movements: Extraocular movements intact.   Cardiovascular:      Rate and Rhythm: Regular rhythm. Bradycardia present.      Pulses: Normal pulses.   Pulmonary:      Effort: Pulmonary  effort is normal.      Breath sounds: Decreased breath sounds present.   Abdominal:      Palpations: Abdomen is soft.   Musculoskeletal:         General: Normal range of motion.   Skin:     General: Skin is warm and dry.      Capillary Refill: Capillary refill takes less than 2 seconds.      Comments: Midline Sternotomy SIA C/D/I   Neurological:      General: No focal deficit present.      Mental Status: She is alert and oriented to person, place, and time.   Psychiatric:         Mood and Affect: Mood normal.         Behavior: Behavior normal.     Current Inpatient Medications:    Current Facility-Administered Medications:     0.9%  NaCl infusion (for blood administration), , Intravenous, Q24H PRN, Wing Edmonds IV, MD    acetaminophen tablet 650 mg, 650 mg, Oral, Q4H PRN, Wing Edmonds IV, MD, 650 mg at 10/12/22 0148    albumin human 5% bottle 12.5 g, 12.5 g, Intravenous, PRN, ROSANGELA Alfonso, Stopped at 09/29/22 0258    amiodarone tablet 200 mg, 200 mg, Oral, Daily, Lien Hurst, FNP, 200 mg at 10/12/22 0900    apixaban tablet 5 mg, 5 mg, Oral, BID, Beto Valencia, AGACNP-BC, 5 mg at 10/12/22 0900    bisacodyL suppository 10 mg, 10 mg, Rectal, Daily PRN, Wing Edmonds IV, MD, 10 mg at 10/08/22 1700    calcium gluconate 1 g in NS IVPB (premixed), 1 g, Intravenous, PRN, ROSANGELA Alfonso    calcium gluconate 1 g in NS IVPB (premixed), 2 g, Intravenous, PRN, ROSANGELA Alfonso    calcium gluconate 1 g in NS IVPB (premixed), 3 g, Intravenous, PRN, ROSANGELA Alfonso    dextrose 10% bolus 125 mL, 12.5 g, Intravenous, PRN, Wing Edmonds IV, MD    dextrose 10% bolus 250 mL, 25 g, Intravenous, PRN, Wing Edmonds IV, MD    famotidine tablet 20 mg, 20 mg, Oral, Daily, Wing Edmonds IV, MD    folic acid tablet 1 mg, 1 mg, Oral, Daily, ROSANGELA Alfonso, 1 mg at 10/12/22 0900    furosemide tablet 20 mg, 20 mg, Oral, Daily, Beto Valencia, [facility], 20 mg at 10/12/22 0900    HYDROcodone-acetaminophen 5-325 mg per  tablet 1 tablet, 1 tablet, Oral, Q4H PRN, ROSANGELA Alfonso, 1 tablet at 10/09/22 2048    lactulose 10 gram/15 ml solution 20 g, 20 g, Oral, Q6H PRN, ROSANGELA Alfonso    loperamide capsule 2 mg, 2 mg, Oral, QID PRN, Florentin Sanchez MD    magnesium sulfate 2g in water 50mL IVPB (premix), 2 g, Intravenous, PRN, ROSANGELA Alfonso    magnesium sulfate 2g in water 50mL IVPB (premix), 4 g, Intravenous, PRN, ROSANGELA Alfonso    metoclopramide HCl injection 5 mg, 5 mg, Intravenous, Q6H PRN, ROSANGELA Alfonso    metoprolol succinate (TOPROL-XL) 24 hr tablet 50 mg, 50 mg, Oral, Daily, NADER Tuttle    morphine injection 2 mg, 2 mg, Intravenous, PRN, ROSANGELA Alfonso    ondansetron injection 8 mg, 8 mg, Intravenous, Q8H PRN, ROSANGELA Alfonso, 8 mg at 10/10/22 2031    polyethylene glycol packet 17 g, 17 g, Oral, BID PRN, Wing Edmonds IV, MD, 17 g at 10/08/22 2013    potassium chloride 20 mEq in 100 mL IVPB (FOR CENTRAL LINE ADMINISTRATION ONLY), 20 mEq, Intravenous, PRN, ROSANGELA Alfonso, Last Rate: 50 mL/hr at 10/05/22 0800, Rate Verify at 10/05/22 0800    potassium chloride 20 mEq in 100 mL IVPB (FOR CENTRAL LINE ADMINISTRATION ONLY), 40 mEq, Intravenous, PRN, ROSANGELA Alfonso, Last Rate: 25 mL/hr at 10/11/22 1807, 40 mEq at 10/11/22 1807    potassium chloride SA CR tablet 20 mEq, 20 mEq, Oral, Daily, NADER Tuttle, 20 mEq at 10/12/22 0900    sacubitriL-valsartan 24-26 mg per tablet 1 tablet, 1 tablet, Oral, BID, NADER Tuttle, 1 tablet at 10/11/22 2100    senna tablet 2 tablet, 2 tablet, Oral, Daily PRN, Wing Edmonds IV, MD, 2 tablet at 10/09/22 1142    sodium chloride 0.9% flush 10 mL, 10 mL, Intravenous, PRN, Ciro Rg MD    sodium chloride 0.9% flush 2 mL, 2 mL, Intravenous, PRN, ROSANGELA Alfonso, 2 mL at 10/07/22 1158    sodium phosphate 15 mmol in dextrose 5 % 250 mL IVPB, 15 mmol, Intravenous, PRN, ROSANGELA Alfonso    sodium phosphate 20.01 mmol in dextrose 5 % 250 mL  IVPB, 20.01 mmol, Intravenous, PRN, ROSANGELA Alfonso    sodium phosphate 30 mmol in dextrose 5 % 250 mL IVPB, 30 mmol, Intravenous, PRN, ROSANGELA Alfonso    sucralfate tablet 1 g, 1 g, Oral, QID (AC & HS), ROSANGELA Alfonso, 1 g at 10/11/22 2100    VTE Risk Mitigation (From admission, onward)           Ordered     apixaban tablet 5 mg  2 times daily         10/11/22 1139     Place sequential compression device  Until discontinued         09/28/22 1021     IP VTE HIGH RISK PATIENT  Once         09/28/22 1010                  Assessment:   VT arrest/successful cardioversion, likely secondary to hypokalemia  VHD (Mod AR, Mild MI/MR)    - s/p (9.28.22) - AVR 25mm Mosaic Porcine Valve  AAA    - s/p (9.28.22) - AAA Resection and Placement of 32mm Interposition Graft  PAF with RVR    - CHADsVASc - 5 Points - 7.2% Stroke Risk per Year     - s/p BOB Ligation (9.28.22) with Endostapler  Acute on Chronic Systolic HF/EF 20-25%  Hx of Hypothyroidism  HTN  HLD  No Hx of GIB    Plan:   Continue Amio oral load  amio 200 mg BID x 3 days, then 200 mg daily.  EP consultation done, per EP defer ICD currently given likely reversible cause(hypokalemia)  Continue Toprol XL 50mg daily with hold parameters  Continue Entresto 24/26mg BID with hold parameters  No ASA Secondary to Allergy  Continue Eliquis 5mg BID  Continue Lasix 20mg daily  Continue Kdur 20meq daily , may need to increase if still having hypokalmia  Continue LifeVest per EP recs.  Keep K >4 and Mg > 2      Beto Valencia, KHUSHBU-BC  Cardiology  [facility] - [address] [place] [address] ICU  10/12/2022

## 2022-10-12 NOTE — PROGRESS NOTES
CT SURGERY PROGRESS NOTE  Sydney Bacon  76 y.o.  1945    Patients Procedure: Procedure(s) (LRB):  REPLACEMENT, AORTIC VALVE (N/A)  REPAIR, AORTA, ASCENDING (N/A)    Subjective  Interval History: NAEO. Patient with no complaints. K now 4.0. Cardiology decided not to place ICD based on likely from electrolyte abnormality, patient and family understand. Going to SNF.      Review of Systems   Constitutional: Negative.    Respiratory:  Negative for cough, sputum production and shortness of breath.    Cardiovascular:  Negative for chest pain, palpitations, claudication and leg swelling.   Gastrointestinal:  Negative for abdominal pain, nausea and vomiting.   Genitourinary: Negative.    Skin: Negative.         Incision C/D/I     Medication List  Infusions        Scheduled   amiodarone  200 mg Oral Daily    apixaban  5 mg Oral BID    famotidine  20 mg Oral Daily    folic acid  1 mg Oral Daily    furosemide  20 mg Oral Daily    metoprolol succinate  50 mg Oral Daily    potassium chloride  20 mEq Oral Daily    sacubitriL-valsartan  1 tablet Oral BID    sucralfate  1 g Oral QID (AC & HS)       Objective:  Recent Vitals:  Temp:  [97.3 °F (36.3 °C)-98.1 °F (36.7 °C)] 97.7 °F (36.5 °C)  Pulse:  [52-95] 79  Resp:  [14-22] 19  SpO2:  [90 %-96 %] 93 %  BP: ()/(41-68) 139/68    Physical Exam  Vitals and nursing note reviewed.   Constitutional:       General: She is awake. She is not in acute distress.     Appearance: Normal appearance. She is not toxic-appearing or diaphoretic.   HENT:      Head: Normocephalic and atraumatic.   Eyes:      Extraocular Movements: Extraocular movements intact.      Pupils: Pupils are equal, round, and reactive to light.   Cardiovascular:      Rate and Rhythm: Normal rate and regular rhythm.      Pulses: Normal pulses.           Radial pulses are 2+ on the right side and 2+ on the left side.        Dorsalis pedis pulses are 2+ on the right side and 2+ on the left side.      Heart sounds:  Normal heart sounds.   Pulmonary:      Effort: Pulmonary effort is normal.      Breath sounds: Normal breath sounds.   Musculoskeletal:      Right lower leg: No edema.      Left lower leg: No edema.   Skin:     General: Skin is warm and dry.      Comments: INCISION C/D/I   Neurological:      General: No focal deficit present.      Mental Status: She is alert and oriented to person, place, and time.   Psychiatric:         Mood and Affect: Mood and affect normal.        I/O last 24 hrs:  Intake/Output - Last 3 Shifts         10/10 0700  10/11 0659 10/11 0700  10/12 0659 10/12 0700  10/13 0659    P.O. 480      I.V. (mL/kg) 705 (11.1)      IV Piggyback 200      Total Intake(mL/kg) 1385 (21.7)      Urine (mL/kg/hr) 550 (0.4)      Total Output 550      Net +835             Urine Occurrence 3 x 2 x     Stool Occurrence 2 x 5 x             Labs  BMP:   Recent Labs   Lab 10/12/22  0438      K 4.0   CO2 28   BUN 9.3*   CREATININE 0.65   CALCIUM 8.3*   MG 2.00       CBC:   Recent Labs   Lab 10/12/22  0438   WBC 9.4   RBC 4.01*   HGB 12.2   HCT 37.4      MCV 93.3   MCH 30.4   MCHC 32.6*       All pertinent labs from the last 24 hours have been reviewed.      Imaging:   CXR: X-Ray Chest AP Portable    Result Date: 9/29/2022  Mild bibasilar atelectasis with possible small pleural effusions. Electronically signed by: Ivan Chan Date:    09/29/2022 Time:    07:20   I have reviewed all pertinent imaging results/findings within the past 24 hours.        ASSESSMENT/PLAN:  - DC to SNF  - follow up in 3 weeks in clinic with Dr Edmonds  - cardiology med rec and follow up      Case and plan of care discussed with MD Eliseo Lentz PA-C

## 2022-10-12 NOTE — PLAN OF CARE
[facility]. Obtained [facility] for TCU and submitted referral via [facility] and now pending auth from [facility]

## 2022-10-25 NOTE — DISCHARGE SUMMARY
[facility] - [facility] ICU  Cardiothoracic Surgery  Discharge Summary      Patient Name: Sydney Bacon  MRN: [id number]  Admission Date: 9/28/2022  Hospital Length of Stay: 14 days  Discharge Date and Time: 10/12/2022  3:54 PM  Attending Physician: No att. providers found   Discharging Provider: ROSANGELA Alfonso  Primary Care Provider: Valente Bacon MD    HPI:   Patient is a 76 y.o. female presents now for follow-up after having had a left heart catheterization and her CT scan of the chest.  Left heart cath reveals no coronary disease.  CT scan of the chest reveals the ascending aorta is 4.6 cm.  On my measurement in some areas it is 4.8 cm.  I do think she will benefit from resection of the ascending aortic aneurysm and an aortic valve replacement with a porcine valve.  I discussed the risk benefits in great detail with the patient.  She understands and would like to proceed.    Procedure(s) (LRB):  REPLACEMENT, AORTIC VALVE (N/A)  REPAIR, AORTA, ASCENDING (N/A)      Indwelling Lines/Drains at time of discharge:   Lines/Drains/Airways     None               Hospital Course: Patient underwent AVR and ascending the aortic aneurysm repair on 9/28/22 and was transferred to ICU from PACU. Once hemodynamically stable the patient was transferred to telemetry. Chest tubes were pulled once output was less than 200cc per 24 hours. Prior to discharge chest xray showed no acute changes from chest tube removal. During Hospital stay, patient experience new onset a fib rvr requiring DCCV. Patient fitted for lifevest base on EF 25-30%    Goals of Care Treatment Preferences:  Code Status: Full Code      Consults (From admission, onward)        Status Ordering Provider     Inpatient consult to Social Work/Case Management  Once        Provider:  (Not yet assigned)    Completed GAYE HERNANDEZ     Inpatient consult to Midline team  Once        Provider:  (Not yet assigned)    Completed AUGUSTINE LR      Inpatient consult to Cardiology  Once        Provider:  Kings Chrsitian MD    Completed JESSA SHELBY          Significant Diagnostic Studies: Labs: All labs within the past 24 hours have been reviewed    Pending Diagnostic Studies:     None          No new Assessment & Plan notes have been filed under this hospital service since the last note was generated.  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    Specialty: Cardiothoracic Surgery  Contact information:  [address]  [address]  [facility] [address]  [phone]             Martinez Mason Ii, MD .    Specialty: Cardiology  Contact information:  Critical access hospital[facility].  [address]  [place] [address]  [phone]                       Patient Instructions:      Lifting restrictions     No dressing needed     Notify your health care provider if you experience any of the following:  temperature >100.4     Notify your health care provider if you experience any of the following:  persistent nausea and vomiting or diarrhea     Notify your health care provider if you experience any of the following:  severe uncontrolled pain     Notify your health care provider if you experience any of the following:  redness, tenderness, or signs of infection (pain, swelling, redness, odor or green/yellow discharge around incision site)     Notify your health care provider if you experience any of the following:  difficulty breathing or increased cough     Notify your health care provider if you experience any of the following:  persistent dizziness, light-headedness, or visual disturbances     Notify your health care provider if you experience any of the following:  increased  confusion or weakness     Activity as tolerated     Medications:  Reconciled Home Medications:      Medication List      CONTINUE taking these medications    loratadine 10 mg tablet  Commonly known as: CLARITIN  Take 10 mg by mouth once daily.     PRESERVISION AREDS ORAL  Take 1 capsule by mouth 2 (two) times daily.     raloxifene 60 mg tablet  Commonly known as: EVISTA  Take 60 mg by mouth once daily.     simvastatin 20 MG tablet  Commonly known as: ZOCOR  Take 20 mg by mouth nightly.        STOP taking these medications    furosemide 40 MG tablet  Commonly known as: LASIX     levoFLOXacin 500 MG tablet  Commonly known as: LEVAQUIN     losartan 100 MG tablet  Commonly known as: COZAAR          Time spent on the discharge of patient: 25 minutes    ROSANGELA Alfonso  Cardiothoracic Surgery  [facility] - [facility] ICU

## 2022-11-08 ENCOUNTER — OFFICE VISIT (OUTPATIENT)
Dept: CARDIAC SURGERY | Facility: CLINIC | Age: 77
End: 2022-11-08
Payer: MEDICARE

## 2022-11-08 VITALS
HEART RATE: 82 BPM | SYSTOLIC BLOOD PRESSURE: 146 MMHG | HEIGHT: 63 IN | BODY MASS INDEX: 24.1 KG/M2 | WEIGHT: 136 LBS | DIASTOLIC BLOOD PRESSURE: 87 MMHG

## 2022-11-08 DIAGNOSIS — Z95.3 STATUS POST AORTIC VALVE REPLACEMENT WITH TISSUE VALVE: Primary | ICD-10-CM

## 2022-11-08 PROCEDURE — 99024 PR POST-OP FOLLOW-UP VISIT: ICD-10-PCS | Mod: ,,, | Performed by: SPECIALIST

## 2022-11-08 PROCEDURE — 99024 POSTOP FOLLOW-UP VISIT: CPT | Mod: ,,, | Performed by: SPECIALIST

## 2022-11-08 NOTE — PROGRESS NOTES
Patient returns about 6 weeks out from resection of ascending aortic aneurysm and aortic valve replacement with a 25 mm mosaic porcine valve.  We also ligated her left atrial appendage.    Postoperatively about 2 or 3 days out she had an episode of ventricular fibrillation requiring cardioversion.  She is been wearing a LifeVest since discharge and has not had any issues with it going off.  She had an echo yesterday at her cardiologist's office but I do not have the results of that yet.  She is not having any shortness of breath or incisional pain.  She is not walking up to speed yet.    The lungs are clear to auscultation bilaterally   Heart has a regular rate and rhythm without murmurs or gallops   The sternum is stable  Her incisions have healed nicely  Overall she is doing well   I think she will benefit from cardiac rehab which we will try to arrange an Op looses   Defer to Cardiology about life vest  Return to clinic p.r.n.

## 2022-11-09 ENCOUNTER — TELEPHONE (OUTPATIENT)
Dept: CARDIAC SURGERY | Facility: CLINIC | Age: 77
End: 2022-11-09
Payer: MEDICARE

## 2022-11-09 DIAGNOSIS — Z95.3 STATUS POST AORTIC VALVE REPLACEMENT WITH TISSUE VALVE: Primary | ICD-10-CM

## 2022-11-09 DIAGNOSIS — I71.21 ASCENDING AORTIC ANEURYSM, UNSPECIFIED WHETHER RUPTURED: ICD-10-CM

## 2022-11-09 NOTE — TELEPHONE ENCOUNTER
CALLED, LEFT MSG INFORMING MS. CASTAÑEDA THAT [facility] IN [place] SAID THAT THEY DO NOT HAVE A CARDIAC REHAB, AND THAT THEY STATED [facility] IN [place] WAS THE CLOSEST CARDIAC REHAB. REQUESTED THAT SHE CALL US BACK AND LET US KNOW IF SHE WOULD LIKE US TO SEND REFERRAL TO [facility].    PT RETURNED CALL. WISHES TO PROCED WITH CARDIAC REHAB IN [place].

## 2022-12-19 NOTE — OP NOTE
Date of service 09/28/2022   Preop diagnosis:  Severe aortic insufficiency, ascending aortic aneurysm, dilated left ventricle  Postop diagnosis:  Same   Procedure:  Resection of ascending aortic aneurysm and placement with 32 mm interposition graft; aortic valve replacement with a 25 mm mosaic porcine valve; ligation of left atrial appendage with endo stapler   Surgeon: Wing Edmonds  Assistant:  Eliseo Lentz  Anesthesia:  General endotracheal with cardiopulmonary bypass   Drains:  Chest tubes x2 and 1 right ventricular pacing wire    Procedure in detail:  The patient was into the operating room under informed consent placed on table in a supine position where general endotracheal anesthesia was induced by the anesthesia department.  She was prepped and draped in usual sterile fashion from the chin to the toes.  Incision made in the right groin and the right common femoral artery with sharp dissection.  A pursestring suture placed on the anterior surface of this vessel and the patient fully heparinized and a femoral arterial catheter introduced and attached to the bypass circuit.  Incision made in the chest from the sternal notch to the xiphoid and carried down sharply to the sternum.  The sternum was transected retractor was placed.  The pericardium incised and a right atrial cannula placed and attached to the bypass circuit.  Passing goal to 32° systemic.  The the ascending aorta was dissected up to the transverse arch.  This was definitely an enlarged.  The aorta was crossclamped just proximal to the innominate artery.  Cold blood [place] cardioplegia was given in an antegrade fashion through the aortic root.  The ascending aorta was then resected the left atrial appendage ligated with an endo stapler and completely excised.  Aortic valve was trileaflet all 3 leaflets were excised 2-0 pledgeted Ethibond sutures placed circumferentially around the annulus in a horizontal mattress fashion.  CO2 infused into the  PEG well during the entire procedure.  The annulus measured and a 25 mm mosaic porcine valve selected and washed.  The sutures were attached to the sewing ring of the valve and the valve past unit in position.  The sutures were tied with the cor knot system.  The bowel was noted to seat nicely.  The aorta measured and a 32 mm Dacron interposition graft selected.  Anastomosis performed in an end-to-end fashion with a running 4 0 Prolene suture.  The graft measured to reach the distal aorta and transected.  The distal anastomosis performed in an end-to-end fashion with a running 4-0 Prolene suture.  De-airing was allowed and carried out prior to completion of the suture line.  Suture line completed the cross clamp removed and the patient.  Right ventricular pacing wires placed brought through separate stab wound and secured to the skin.  The patient a sinus bradycardic rhythm.  She was then paced and subsequently easily weaned from cardiopulmonary bypass.  Protamine given to reverse the heparin and the arterial and venous cannulae removed and their pursestring sutures secured.  The wound was copiously irrigated with a 3% saline antibiotic solution.  An anterior and posterior medial multiple placed and brought out through separate stab wounds and secured to the skin.  Platelet concentrated aggregated growth factor was infused throughout the wound and sternum.  The sternum was closed with sternal wires.  The linea alba and sternal fascia closed with a running 1. Vicryl.  The subcutaneous tissue with a 2-0 and the skin with a 3-0 subcuticular stitch.  Transesophageal echo to indicate revealed a good result with no evidence of aortic insufficiency and unchanged left ventricular function.  The patient left the operating room in stable condition.   Universal Safety Interventions

## (undated) DEVICE — SUT ETHBND XTRA 1 OS-8 30IN

## (undated) DEVICE — CATH THORACIC 28FR ST

## (undated) DEVICE — CABLE PACING ALLGTR CLIP 12FT

## (undated) DEVICE — SOL PLASMALYTE PH 7.4 1000ML

## (undated) DEVICE — DRAPE SLUSH WARMER WITH DISC

## (undated) DEVICE — SUT MAXON GRN 0 CLSR 27IN

## (undated) DEVICE — GLOVE PROTEXIS HYDROGEL SZ6.5

## (undated) DEVICE — DRESSING TELFA + RECT 6X10IN

## (undated) DEVICE — TRAY CATH FOL SIL TEMP 10 16FR

## (undated) DEVICE — SOL IRR NACL .9% 3000ML

## (undated) DEVICE — CANNULA MC2 OVAL NVENT 32/40FR

## (undated) DEVICE — Device

## (undated) DEVICE — KIT VASCULAR DILATOR PAS

## (undated) DEVICE — STOPCOCK 4-WAY

## (undated) DEVICE — PAD DEFIB CADENCE ADULT R2

## (undated) DEVICE — CARTRIDGE HEPARIN 2 CHNNL ACT

## (undated) DEVICE — SUT VICRYL 1 OB 36 CTX

## (undated) DEVICE — SOL LAC RINGERS 1000ML INJ

## (undated) DEVICE — GLOVE PROTEXIS LTX MICRO  7.5

## (undated) DEVICE — CANNULA NON-VENT 24FR 14.5IN

## (undated) DEVICE — APPLICATOR SURG 2 SPRY 1 PLUNG

## (undated) DEVICE — KIT CATHGARD DBL LUMN 9FRX11.5

## (undated) DEVICE — SOL ELECTROLYTE PH 7.4 500ML

## (undated) DEVICE — SUT MONOCRYL PLUS UD 3-0 27

## (undated) DEVICE — INSERT INTRACK CLAMP ULT 66MM

## (undated) DEVICE — COR KNOT QUICK LOAD SINGLES

## (undated) DEVICE — CONNECTOR Y STRL 3/8X3/8X3/8IN

## (undated) DEVICE — SOL NORMAL USPCA 0.9%

## (undated) DEVICE — SUT PROLENE BL 4-0 RB-1 36IN

## (undated) DEVICE — DRAIN CHEST DRY SUCTION

## (undated) DEVICE — SEE MEDLINE ITEM 159606

## (undated) DEVICE — CARTRIDGE SILV 4CHAN 2.0-3.5MG

## (undated) DEVICE — BAG MEDI-PLAST DECANTER C-FLOW

## (undated) DEVICE — RELOAD ECHELON ENDOPATH 45MM

## (undated) DEVICE — GLOVE PROTEXIS BLUE LATEX 7

## (undated) DEVICE — STAPLER ECHELON FLEX GST 45MM

## (undated) DEVICE — KIT C.A.T.S. FAST START 4/CASE

## (undated) DEVICE — GLOVE PROTEXIS LTX MICRO  7

## (undated) DEVICE — CATH ALL PUR URTHL 20FR

## (undated) DEVICE — WIRE INTRAMYOCARDIAL TEMP

## (undated) DEVICE — NDL ASPIRATOR AIR 16G

## (undated) DEVICE — HOLDER STRIP-T SELF ADH 2X10IN

## (undated) DEVICE — CATH THOR STND RGHT ANG 28F

## (undated) DEVICE — SUT 2 30IN SILK BLK BRAIDE

## (undated) DEVICE — SUT VICRYL 3-0 8-18 PS-1

## (undated) DEVICE — GLOVE PROTEXIS BLUE LATEX 6.5

## (undated) DEVICE — SENSOR LOW LEVEL OXYGEN

## (undated) DEVICE — DEVICE COR KNOT MIS COMBO KIT

## (undated) DEVICE — CARTRIDGE HEPARIN DOSE

## (undated) DEVICE — KIT NEXTGEN CANN FEM ART 17FR

## (undated) DEVICE — SOL .9NACL PF 100 ML

## (undated) DEVICE — COVER PROBE US 5.5X58L NON LTX

## (undated) DEVICE — GLOVE PROTEXIS LTX MICRO 8

## (undated) DEVICE — SOL IRRI STRL WATER 1000ML